# Patient Record
Sex: FEMALE | Race: WHITE | NOT HISPANIC OR LATINO | Employment: OTHER | URBAN - METROPOLITAN AREA
[De-identification: names, ages, dates, MRNs, and addresses within clinical notes are randomized per-mention and may not be internally consistent; named-entity substitution may affect disease eponyms.]

---

## 2017-01-04 ENCOUNTER — ALLSCRIPTS OFFICE VISIT (OUTPATIENT)
Dept: OTHER | Facility: OTHER | Age: 67
End: 2017-01-04

## 2017-01-04 DIAGNOSIS — E78.5 HYPERLIPIDEMIA: ICD-10-CM

## 2017-01-04 DIAGNOSIS — M85.88 OTHER SPECIFIED DISORDERS OF BONE DENSITY AND STRUCTURE, OTHER SITE: ICD-10-CM

## 2017-01-12 ENCOUNTER — ALLSCRIPTS OFFICE VISIT (OUTPATIENT)
Dept: OTHER | Facility: OTHER | Age: 67
End: 2017-01-12

## 2017-05-08 ENCOUNTER — ALLSCRIPTS OFFICE VISIT (OUTPATIENT)
Dept: OTHER | Facility: OTHER | Age: 67
End: 2017-05-08

## 2017-05-08 ENCOUNTER — HOSPITAL ENCOUNTER (OUTPATIENT)
Dept: RADIOLOGY | Facility: MEDICAL CENTER | Age: 67
Discharge: HOME | End: 2017-05-08
Payer: MEDICARE

## 2017-05-08 ENCOUNTER — GENERIC CONVERSION - ENCOUNTER (OUTPATIENT)
Dept: OTHER | Facility: OTHER | Age: 67
End: 2017-05-08

## 2017-05-08 DIAGNOSIS — M70.52 OTHER BURSITIS OF KNEE, LEFT KNEE: ICD-10-CM

## 2017-05-08 PROCEDURE — 73562 X-RAY EXAM OF KNEE 3: CPT

## 2017-05-25 ENCOUNTER — LAB CONVERSION - ENCOUNTER (OUTPATIENT)
Dept: OTHER | Facility: OTHER | Age: 67
End: 2017-05-25

## 2017-05-25 ENCOUNTER — GENERIC CONVERSION - ENCOUNTER (OUTPATIENT)
Dept: OTHER | Facility: OTHER | Age: 67
End: 2017-05-25

## 2017-05-25 LAB
A/G RATIO (HISTORICAL): 1.5 (CALC) (ref 1–2.5)
ALBUMIN SERPL BCP-MCNC: 3.9 G/DL (ref 3.6–5.1)
ALP SERPL-CCNC: 53 U/L (ref 33–130)
ALT SERPL W P-5'-P-CCNC: 19 U/L (ref 6–29)
AST SERPL W P-5'-P-CCNC: 16 U/L (ref 10–35)
BILIRUB SERPL-MCNC: 0.6 MG/DL (ref 0.2–1.2)
BUN SERPL-MCNC: 14 MG/DL (ref 7–25)
BUN/CREA RATIO (HISTORICAL): NORMAL (CALC) (ref 6–22)
CALCIUM SERPL-MCNC: 9.3 MG/DL (ref 8.6–10.4)
CHLORIDE SERPL-SCNC: 100 MMOL/L (ref 98–110)
CHOLEST SERPL-MCNC: 193 MG/DL (ref 125–200)
CHOLEST/HDLC SERPL: 5.4 (CALC)
CO2 SERPL-SCNC: 28 MMOL/L (ref 20–31)
CREAT SERPL-MCNC: 0.9 MG/DL (ref 0.5–0.99)
EGFR AFRICAN AMERICAN (HISTORICAL): 76 ML/MIN/1.73M2
EGFR-AMERICAN CALC (HISTORICAL): 66 ML/MIN/1.73M2
GAMMA GLOBULIN (HISTORICAL): 2.6 G/DL (CALC) (ref 1.9–3.7)
GLUCOSE (HISTORICAL): 93 MG/DL (ref 65–99)
HDLC SERPL-MCNC: 36 MG/DL
LDL CHOLESTEROL (HISTORICAL): 129 MG/DL (CALC)
NON-HDL-CHOL (CHOL-HDL) (HISTORICAL): 157 MG/DL (CALC)
POTASSIUM SERPL-SCNC: 4.4 MMOL/L (ref 3.5–5.3)
SODIUM SERPL-SCNC: 135 MMOL/L (ref 135–146)
TOTAL PROTEIN (HISTORICAL): 6.5 G/DL (ref 6.1–8.1)
TRIGL SERPL-MCNC: 141 MG/DL
TSH SERPL DL<=0.05 MIU/L-ACNC: 1.21 MIU/L (ref 0.4–4.5)

## 2017-06-29 ENCOUNTER — GENERIC CONVERSION - ENCOUNTER (OUTPATIENT)
Dept: OTHER | Facility: OTHER | Age: 67
End: 2017-06-29

## 2017-07-11 ENCOUNTER — GENERIC CONVERSION - ENCOUNTER (OUTPATIENT)
Dept: OTHER | Facility: OTHER | Age: 67
End: 2017-07-11

## 2017-07-13 ENCOUNTER — ALLSCRIPTS OFFICE VISIT (OUTPATIENT)
Dept: OTHER | Facility: OTHER | Age: 67
End: 2017-07-13

## 2017-09-13 DIAGNOSIS — Z12.31 ENCOUNTER FOR SCREENING MAMMOGRAM FOR MALIGNANT NEOPLASM OF BREAST: ICD-10-CM

## 2017-09-18 ENCOUNTER — GENERIC CONVERSION - ENCOUNTER (OUTPATIENT)
Dept: OTHER | Facility: OTHER | Age: 67
End: 2017-09-18

## 2017-09-18 ENCOUNTER — HOSPITAL ENCOUNTER (OUTPATIENT)
Dept: RADIOLOGY | Age: 67
Discharge: HOME | End: 2017-09-18
Payer: MEDICARE

## 2017-09-18 DIAGNOSIS — Z12.31 ENCOUNTER FOR SCREENING MAMMOGRAM FOR MALIGNANT NEOPLASM OF BREAST: ICD-10-CM

## 2017-09-18 PROCEDURE — G0202 SCR MAMMO BI INCL CAD: HCPCS

## 2017-10-06 ENCOUNTER — ALLSCRIPTS OFFICE VISIT (OUTPATIENT)
Dept: OTHER | Facility: OTHER | Age: 67
End: 2017-10-06

## 2017-10-07 NOTE — PROGRESS NOTES
Assessment  1  Left serous otitis media (381 4) (H65 92)    Plan  Left serous otitis media    · Zithromax Z-Ricco 250 MG Oral Tablet (Azithromycin); TAKE 2 TABLETS ON DAY 1  THEN TAKE 1 TABLET A DAY FOR 4 DAYS   · Call [phone] if: Your ear pain is getting worse ; Status:Complete;   Done:  14UYG6705 11:38AM    Discussion/Summary    Continue with symptom treatment  Start fluticasone nasal spray  Chief Complaint  1  Ear Pain   2  Sore Throat    History of Present Illness  HPI: History of allergic rhinitis on Claritin 10 mg daily year round  Patient presents with increased nasal congestion with sore throat and postnasal drainage  intermittent left ear pain  No drainage  mild non productive cough  Review of Systems    Constitutional: no fever-and-no chills  ENT: as noted in HPI,-no nasal discharge-and-no hoarseness  Respiratory: as noted in HPI,-no shortness of breath-and-no wheezing  Gastrointestinal: no nausea,-no vomiting-and-no diarrhea  Musculoskeletal: no myalgias  Neurological: no headache-and-no dizziness  Active Problems  1  Airborne allergy, current reaction (477 9) (J30 9)   2  Asymptomatic postmenopausal status (age-related) (natural) (V49 81) (Z78 0)   3  Basal cell carcinoma of skin (173 91) (C44 91)   4  Benign essential hypertension (401 1) (I10)   5  Closed Fracture Of Head Of Left Radius (813 05)   6  Degenerative arthritis of left knee (715 96) (M17 9)   7  Displacement of lumbar intervertebral disc (722 10) (M51 26)   8  Dysfunction of left eustachian tube (381 81) (H69 82)   9  GERD without esophagitis (530 81) (K21 9)   10  Hyperlipidemia (272 4) (E78 5)   11  Hyperthyroidism (242 90) (E05 90)   12  Left serous otitis media (381 4) (H65 92)   13  Lumbar radiculopathy (724 4) (M54 16)   14  Osteopenia (733 90) (M85 80)   15  Osteopenia of other site (733 90) (M85 88)   16  Osteoporosis (733 00) (M81 0)    Past Medical History  1   History of Fibrocystic breast disease, unspecified laterality (610 1) (N60 19)   2  History of hypercholesterolemia (V12 29) (Z86 39)   3  History of sinusitis (V12 69) (Z87 09)    Family History  Mother    1  Family history of Hypertension (V17 49)   2  Family history of Thyroid Disorder (V18 19)  Brother    3  Family history of Diabetes Mellitus (V18 0)   4  Family history of Renal Failure    Social History   · Denied: History of Alcohol   · Marital History - Currently    · Never a smoker   · Never A Smoker    Current Meds   1  Advil CAPS; Therapy: (Recorded:36Krp1058) to Recorded   2  Aspirin 81 MG TABS; Therapy: (Recorded:94Fqq7327) to Recorded   3  Biotin 1 MG Oral Capsule Recorded   4  Calcium + D TABS Recorded   5  Claritin 10 MG Oral Tablet; take 1 tablet daily as needed Recorded   6  [facility] Fish Oil CAPS Recorded   7  Fluticasone Propionate 50 MCG/ACT Nasal Suspension; USE AS DIRECTED Recorded   8  Lisinopril-Hydrochlorothiazide 20-12 5 MG Oral Tablet; TAKE 1 TABLET DAILY; Therapy: 65OTZ6380 to ([phone])  Requested for: 04Qpr1954; Last   Rx:41Gfw5507 Ordered   9  Metoprolol Succinate ER 50 MG Oral Tablet Extended Release 24 Hour; TAKE 1 TABLET   DAILY; Therapy: 03ZJU3961 to (Evaluate:63Uaf4733); Last AR:76UOV9196 Ordered   10  Omeprazole 20 MG Oral Capsule Delayed Release; TAKE 1 CAP DAILY IN THE    MORNING; Therapy: 09ZZG2468 to (YTVHKOKE:58XDC4203)  Requested for: 96KBQ6620; Last    Rx:26Jan2017 Ordered   11  Pravastatin Sodium 20 MG Oral Tablet; 1 tab orally twice a week due to muscle pain with    atorvastatin; Therapy: 28NFC3245 to (Serenity Mera)  Requested for: 96VMZ6981; Last    Rx:04Jan2017 Ordered    Allergies  1   No Known Drug Allergies    Vitals   Recorded: 67JRL5314 11:20AM   Temperature 98 5 F   Heart Rate 70   Respiration 16   Systolic 175   Diastolic 64   Height 5 ft 2 in   Weight 148 lb 9 6 oz   BMI Calculated 27 18   BSA Calculated 1 68     Physical Exam    Constitutional   General appearance: No acute distress, well appearing and well nourished  Head and Face   Palpation of the face and sinuses: No sinus tenderness  Eyes   Conjunctiva and lids: No swelling, erythema or discharge  Ears, Nose, Mouth, and Throat   Otoscopic examination: Abnormal   The right tympanic membrane was normal  The left tympanic membrane was retracted, but-was not red  Exam of the left middle ear showed a middle ear effusion  Oropharynx: Abnormal  -Mild redness and cobblestoning posterior pharynx  Neck   Neck: Supple, symmetric, trachea midline, no masses  Thyroid: Normal, no thyromegaly  Pulmonary   Respiratory effort: No increased work of breathing or signs of respiratory distress  Auscultation of lungs: Clear to auscultation  Cardiovascular   Auscultation of heart: Normal rate and rhythm, normal S1 and S2, no murmurs  Lymphatic   Palpation of lymph nodes in neck: No lymphadenopathy  Skin   Skin and subcutaneous tissue: Normal without rashes or lesions         Signatures   Electronically signed by : PADMINI Argueta ; Oct  6 2017 11:39AM EST                       (Author)

## 2017-12-22 ENCOUNTER — ALLSCRIPTS OFFICE VISIT (OUTPATIENT)
Dept: OTHER | Facility: OTHER | Age: 67
End: 2017-12-22

## 2017-12-23 NOTE — PROGRESS NOTES
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)    Plan   Other herpes zoster eye disease    · PredniSONE 10 MG Oral Tablet; 1 1/2  PO BID FOR 3 DAYS THEN 1 PO BID FOR 3    DAYS  WITH FOOD;   · ValACYclovir HCl - 1 GM Oral Tablet; TAKE 1 TABLET 3 TIMES DAILY    Discussion/Summary      ADD VALTREX PRED UP WTIH OPHTHO- SHE WILL CALL HER EYE DR  Chief Complaint   1  Headache   2  Nasal Symptoms  patient HERE FOR HEADACHE AND NASAL CONGESTION;           History of Present Illness   Herpes Zoster (Brief): The patient is being seen for worsening symptoms of herpes zoster  Symptoms:  dermatomal pain,-- skin tingling,-- skin redness,-- rash-- and-- vesicular lesion(s), but-- no localized itching,-- no lesion drainage-- and-- no lesion crusting  The patient is currently experiencing symptoms  Associated symptoms:  no fever,-- no chills,-- no myalgias,-- no headache,-- no eye redness,-- no photophobia,-- no hearing loss-- and-- no tinnitus  The patient is not currently being treated for this problem  By report, there is good compliance with treatment  HPI: HEADACHES, PRESSURE ABOVE HER EYES AND UNDER HER EYES; SHE HAS OCC PAIN IN RIGHT TEMPLE- SHE HAS A LITTLE RASH THERE; SHE HAD SOME TEARING YESTERDAY OF RIGHT EYE;    Headache: Moris Mobley presents with complaints of headache  Associated symptoms include different headache features-- and-- scalp tenderness, but-- no fever,-- no chills,-- no vertigo,-- no ataxia,-- no dysarthria,-- no aphasia,-- no diplopia-- and-- no vision loss  Review of Systems        Constitutional: feeling tired, but-- as noted in HPI       ENT: RIGHT SIDED TEMPLE PAIN, but-- no ear ache, no loss of hearing, no nosebleeds or nasal discharge, no sore throat or hoarseness-- and-- as noted in HPI  Cardiovascular: NASAL CONGESITON, but-- no complaints of slow or fast heart rate, no chest pain, no palpitations, no leg claudication or lower extremity edema  Respiratory: no complaints of shortness of breath, no wheezing, no dyspnea on exertion, no orthopnea or PND-- and-- as noted in HPI  Breasts: no complaints of breast pain, breast lump or nipple discharge  Gastrointestinal: no complaints of abdominal pain, no constipation, no nausea or diarrhea, no vomiting, no bloody stools  Genitourinary: no complaints of dysuria, no incontinence, no pelvic pain, no dysmenorrhea, no vaginal discharge or abnormal vaginal bleeding  Musculoskeletal: no complaints of arthralgia, no myalgia, no joint swelling or stiffness, no limb pain or swelling  Integumentary: no complaints of skin rash or lesion, no itching or dry skin, no skin wounds  Neurological: headache-- and-- TENDERNESS RIGHT TEMPLE AND RASH, but-- no complaints of headache, no confusion, no numbness or tingling, no dizziness or fainting-- and-- as noted in HPI  ROS reviewed  Active Problems   1  Airborne allergy, current reaction (477 9) (J30 9)   2  Asymptomatic postmenopausal status (age-related) (natural) (V49 81) (Z78 0)   3  Basal cell carcinoma of skin (173 91) (C44 91)   4  Benign essential hypertension (401 1) (I10)   5  Closed Fracture Of Head Of Left Radius (813 05)   6  Degenerative arthritis of left knee (715 96) (M17 9)   7  Displacement of lumbar intervertebral disc (722 10) (M51 26)   8  Dysfunction of left eustachian tube (381 81) (H69 82)   9  GERD without esophagitis (530 81) (K21 9)   10  Hyperlipidemia (272 4) (E78 5)   11  Hyperthyroidism (242 90) (E05 90)   12  Left serous otitis media (381 4) (H65 92)   13  Lumbar radiculopathy (724 4) (M54 16)   14  Need for influenza vaccination (V04 81) (Z23)   15  Osteopenia (733 90) (M85 80)   16  Osteopenia of other site (733 90) (M85 88)   17  Osteoporosis (733 00) (M81 0)    Past Medical History   Active Problems And Past Medical History Reviewed:     The active problems and past medical history were reviewed and updated today       Surgical History   Surgical History Reviewed: The surgical history was reviewed and updated today  Social History    · Denied: History of Alcohol   · Marital History - Currently    · Never a smoker   · Never A Smoker  The social history was reviewed and updated today  The social history was reviewed and is unchanged  Family History   Family History Reviewed: The family history was reviewed and updated today  Current Meds    1  Advil CAPS; Therapy: (Recorded:60Npm8913) to Recorded   2  Aspirin 81 MG TABS; Therapy: (Recorded:22Jms4270) to Recorded   3  Biotin 1 MG Oral Capsule Recorded   4  Calcium + D TABS Recorded   5  Claritin 10 MG Oral Tablet; take 1 tablet daily as needed Recorded   6  [employer] Fish Oil CAPS Recorded   7  Fluticasone Propionate 50 MCG/ACT Nasal Suspension; USE AS DIRECTED Recorded   8  Lisinopril-Hydrochlorothiazide 20-12 5 MG Oral Tablet; TAKE 1 TABLET DAILY; Therapy: 49GBN9597 to ([phone])  Requested for: 74Uri3486; Last     Rx:47Kok3965 Ordered   9  Metoprolol Succinate ER 50 MG Oral Tablet Extended Release 24 Hour; TAKE 1 TABLET     DAILY; Therapy: 55BUQ9615 to (Evaluate:52Tta3147); Last SA:10KNQ8501 Ordered   10  Omeprazole 20 MG Oral Capsule Delayed Release; TAKE 1 CAP DAILY IN THE      MORNING; Therapy: 30VBU7430 to ([facility]58[facility]45)  Requested for: 04CSY3926; Last      Rx:33Nra2096 Ordered   11  Pravastatin Sodium 20 MG Oral Tablet; 1 tab orally twice a week due to muscle pain with      atorvastatin; Therapy: 19HXV1316 to (Mony Raviing)  Requested for: 74Cfh0146; Last      Rx:01Qxt2647 Ordered     The medication list was reviewed and updated today  Allergies   1   No Known Drug Allergies    Vitals    Recorded: 22Dec2017 11:32AM   Temperature 98 5 F, Tympanic   Heart Rate 60   Respiration 16   Systolic 101, LUE, Sitting   Diastolic 78, LUE, Sitting   Height 5 ft 2 in   Weight 150 lb 8 oz   BMI Calculated 27 53   BSA Calculated 1 69     Physical Exam        Constitutional      General appearance: No acute distress, well appearing and well nourished  -- WDWN FEMALE  Eyes      Conjunctiva and lids: No swelling, erythema or discharge  -- EOMI PERRLA; SCLERA ANICTERIC; SOME TEARING RIGHT EYE;  Pupils and irises: Equal, round and reactive to light  Ears, Nose, Mouth, and Throat      External inspection of ears and nose: Normal        Otoscopic examination: Tympanic membranes translucent with normal light reflex  Canals patent without erythema  Oropharynx: Normal with no erythema, edema, exudate or lesions  Pulmonary      Respiratory effort: No increased work of breathing or signs of respiratory distress  Auscultation of lungs: Clear to auscultation  Cardiovascular      Palpation of heart: Normal PMI, no thrills  Auscultation of heart: Normal rate and rhythm, normal S1 and S2, without murmurs  Lymphatic      Palpation of lymph nodes in neck: No lymphadenopathy  Skin      Skin and subcutaneous tissue: Abnormal  -- RED RAISED RASH RIGHT TEMPLE; SOME HYEPRESTHESIA  Neurologic      Cranial nerves: Cranial nerves 2-12 intact  Reflexes: 2+ and symmetric  Signatures    Electronically signed by :  Alondra [address] [place] [address]; Dec 22 2017 12:02PM EST                       (Author)

## 2018-01-10 NOTE — RESULT NOTES
Verified Results  (1) CBC/PLT/DIFF 24VUC0938 09:40AM Francisco Part     Test Name Result Flag Reference   WHITE BLOOD CELL COUNT 4 4 Thousand/uL  3 8-10 8   RED BLOOD CELL COUNT 4 14 Million/uL  3 80-5 10   HEMOGLOBIN 12 9 g/dL  11 7-15 5   HEMATOCRIT 39 0 %  35 0-45 0   MCV 94 2 fL  80 0-100 0   MCH 31 1 pg  27 0-33 0   MCHC 33 0 g/dL  32 0-36 0   RDW 14 4 %  11 0-15 0   PLATELET COUNT 207 Thousand/uL  140-400   MPV 7 4 fL L 7 5-11 5   ABSOLUTE NEUTROPHILS 2561 cells/uL  5143-1934   ABSOLUTE LYMPHOCYTES 1285 cells/uL  850-3900   ABSOLUTE MONOCYTES 308 cells/uL  200-950   ABSOLUTE EOSINOPHILS 216 cells/uL     ABSOLUTE BASOPHILS 31 cells/uL  0-200   NEUTROPHILS 58 2 %     LYMPHOCYTES 29 2 %     MONOCYTES 7 0 %     EOSINOPHILS 4 9 %     BASOPHILS 0 7 %       (1) COMPREHENSIVE METABOLIC PANEL 60ESB1994 80:61CE Francisco Part     Test Name Result Flag Reference   GLUCOSE 99 mg/dL  65-99   Fasting reference interval   UREA NITROGEN (BUN) 14 mg/dL  7-25   CREATININE 0 85 mg/dL  0 50-0 99   For patients >52years of age, the reference limit  for Creatinine is approximately 13% higher for people  identified as -American  eGFR NON-AFR   AMERICAN 71 mL/min/1 73m2  > OR = 60   eGFR AFRICAN AMERICAN 82 mL/min/1 73m2  > OR = 60   BUN/CREATININE RATIO   6-74   NOT APPLICABLE (calc)   SODIUM 137 mmol/L  135-146   POTASSIUM 4 6 mmol/L  3 5-5 3   CHLORIDE 102 mmol/L     CARBON DIOXIDE 27 mmol/L  19-30   CALCIUM 9 5 mg/dL  8 6-10 4   PROTEIN, TOTAL 6 7 g/dL  6 1-8 1   ALBUMIN 4 0 g/dL  3 6-5 1   GLOBULIN 2 7 g/dL (calc)  1 9-3 7   ALBUMIN/GLOBULIN RATIO 1 5 (calc)  1 0-2 5   BILIRUBIN, TOTAL 0 7 mg/dL  0 2-1 2   ALKALINE PHOSPHATASE 56 U/L     AST 20 U/L  10-35   ALT 22 U/L  6-29     (1) LIPID PANEL, FASTING 63Rey5193 09:40AM Francisco Part     Test Name Result Flag Reference   CHOLESTEROL, TOTAL 218 mg/dL H 125-200   HDL CHOLESTEROL 30 mg/dL L > OR = 46   TRIGLICERIDES 515 mg/dL H <150   LDL-CHOLESTEROL 148 mg/dL (calc) H <130   Desirable range <100 mg/dL for patients with CHD or  diabetes and <70 mg/dL for diabetic patients with  known heart disease  CHOL/HDLC RATIO 7 3 (calc) H < OR = 5 0   NON HDL CHOLESTEROL 188 mg/dL (calc) H    Target for non-HDL cholesterol is 30 mg/dL higher than   LDL cholesterol target       (Q) TSH, 3RD GENERATION 44Adx5585 09:40AM Kolby Breaker   REPORT COMMENT:  FASTING:YES     Test Name Result Flag Reference   TSH 1 44 mIU/L  0 40-4 50

## 2018-01-10 NOTE — RESULT NOTES
Verified Results  * XR KNEE 3 VW LEFT NON INJURY 18TFH9556 02:19PM Kristi Alfaro Order Number: [id number]     Test Name Result Flag Reference   XR KNEE 3 VW LEFT (Report)     LEFT KNEE     INDICATION: Left knee pain  Bursitis  COMPARISON: September 9, 2014     VIEWS: AP, lateral and sunrise      IMAGES: 3     FINDINGS:     There is no acute fracture or dislocation  There is no joint effusion  Mild narrowing of the medial femorotibial joint space  No lytic or blastic lesions are seen  Soft tissues are unremarkable  IMPRESSION:     Mild degenerative changes, medial compartment  Workstation performed: [id number]     Signed by:   Noel Garrison MD   5/8/17

## 2018-01-12 VITALS
RESPIRATION RATE: 16 BRPM | HEART RATE: 70 BPM | BODY MASS INDEX: 27.34 KG/M2 | SYSTOLIC BLOOD PRESSURE: 116 MMHG | WEIGHT: 148.6 LBS | TEMPERATURE: 98.5 F | DIASTOLIC BLOOD PRESSURE: 64 MMHG | HEIGHT: 62 IN

## 2018-01-12 NOTE — RESULT NOTES
Discussion/Summary   Labs are all very good! Verified Results  (1) COMPREHENSIVE METABOLIC PANEL 64SKB3287 69:90FT Calvin Part     Test Name Result Flag Reference   GLUCOSE 93 mg/dL  65-99   Fasting reference interval   UREA NITROGEN (BUN) 14 mg/dL  7-25   CREATININE 0 90 mg/dL  0 50-0 99   For patients >52years of age, the reference limit  for Creatinine is approximately 13% higher for people  identified as -American  eGFR NON-AFR  AMERICAN 66 mL/min/1 73m2  > OR = 60   eGFR AFRICAN AMERICAN 76 mL/min/1 73m2  > OR = 60   BUN/CREATININE RATIO   6-92   NOT APPLICABLE (calc)   SODIUM 135 mmol/L  135-146   POTASSIUM 4 4 mmol/L  3 5-5 3   CHLORIDE 100 mmol/L     CARBON DIOXIDE 28 mmol/L  20-31   CALCIUM 9 3 mg/dL  8 6-10 4   PROTEIN, TOTAL 6 5 g/dL  6 1-8 1   ALBUMIN 3 9 g/dL  3 6-5 1   GLOBULIN 2 6 g/dL (calc)  1 9-3 7   ALBUMIN/GLOBULIN RATIO 1 5 (calc)  1 0-2 5   BILIRUBIN, TOTAL 0 6 mg/dL  0 2-1 2   ALKALINE PHOSPHATASE 53 U/L     AST 16 U/L  10-35   ALT 19 U/L  6-29     (1) LIPID PANEL, FASTING 48AWE4250 10:04AM Calvin Part     Test Name Result Flag Reference   CHOLESTEROL, TOTAL 193 mg/dL  125-200   HDL CHOLESTEROL 36 mg/dL L > OR = 46   TRIGLICERIDES 216 mg/dL  <150   LDL-CHOLESTEROL 129 mg/dL (calc)  <130   Desirable range <100 mg/dL for patients with CHD or  diabetes and <70 mg/dL for diabetic patients with  known heart disease  CHOL/HDLC RATIO 5 4 (calc) H < OR = 5 0   NON HDL CHOLESTEROL 157 mg/dL (calc)     Target for non-HDL cholesterol is 30 mg/dL higher than   LDL cholesterol target       (Q) TSH, 3RD GENERATION 80GXA8605 10:04AM Francisco Part   REPORT COMMENT:  FASTING:YES     Test Name Result Flag Reference   TSH 1 21 mIU/L  0 40-4 50

## 2018-01-13 VITALS
HEIGHT: 62 IN | SYSTOLIC BLOOD PRESSURE: 110 MMHG | WEIGHT: 153 LBS | RESPIRATION RATE: 16 BRPM | HEART RATE: 72 BPM | BODY MASS INDEX: 28.16 KG/M2 | DIASTOLIC BLOOD PRESSURE: 70 MMHG | TEMPERATURE: 98.3 F

## 2018-01-13 VITALS
WEIGHT: 148 LBS | HEART RATE: 72 BPM | RESPIRATION RATE: 16 BRPM | DIASTOLIC BLOOD PRESSURE: 78 MMHG | BODY MASS INDEX: 27.23 KG/M2 | SYSTOLIC BLOOD PRESSURE: 138 MMHG | HEIGHT: 62 IN | TEMPERATURE: 97.1 F

## 2018-01-13 VITALS
RESPIRATION RATE: 16 BRPM | HEIGHT: 62 IN | HEART RATE: 74 BPM | BODY MASS INDEX: 27.75 KG/M2 | SYSTOLIC BLOOD PRESSURE: 110 MMHG | TEMPERATURE: 98.2 F | WEIGHT: 150.8 LBS | DIASTOLIC BLOOD PRESSURE: 78 MMHG

## 2018-01-15 NOTE — RESULT NOTES
Verified Results  * MAMMO SCREENING BILATERAL W CAD 13Yss6647 12:01PM Jada Devlin Order Number: [id number]    Order Number: [id number]     Test Name Result Flag Reference   MAMMO SCREENING BILATERAL W CAD (Report)     Patient History:   Patient is postmenopausal and has history of squamous cell skin    cancer  No known family history of cancer  Excisional biopsy of the right breast    Patient has never smoked  Patient's BMI is 26 6  Reason for exam: screening (asymptomatic)  Mammo Screening Bilateral W CAD: September 12, 2016 - Check In #:   [de-identified]   Bilateral MLO, CC, and XCCL view(s) were taken  Technologist: RT Amie(R)(M)   Prior study comparison: July 23, 2015, bilateral [facility] digtl scrn   mammo w/CAD, performed at [address] [facility],[address]  July 10, 2014, bilateral WB digitl bilat adria, performed at [address] [place] [address]  July 5, 2013, right breast    unilateral diagnostic mammogram, performed at [address] [place] [address]  June 26, 2013, digital bilateral screening    mammogram performed at [address] [place] [address]  June 20, 2012, digital bilateral screening mammogram performed at [address] [place] [address]  Beth 15, 2011, digital bilateral    screening mammogram performed at [address] [place] [address]  There are scattered fibroglandular densities  No dominant soft tissue mass, architectural distortion or    suspicious calcifications are noted in either breast  The skin    and nipple contours are within normal limits  Probable cyst    noted at the retroareolar right breast, posteriorly  Targeted    ultrasound recommended        ASSESSMENT: BiRad:0 - Incomplete: needs additional imaging    evaluation     A breast health care nurse from our facility will be contacting    the patient regarding the need for additional imaging       Recommendation:   Ultrasound of the right breast    Analyzed by CAD     8-10% of cancers will be missed on mammography  Management of a    palpable abnormality must be based on clinical grounds  Patients   will be notified of their results via letter from our facility  Accredited by [employer] of [employer]iology and FDA  Transcription Location: ANDRES Escobedo 98: [id number]     Risk Value(s):   Tyrer-Cuzick 10 Year: 2 772%, Tyrer-Cuzick Lifetime: 5 304%,    Myriad Table: 1 5%, ELIZABETH 5 Year: 1 9%, NCI Lifetime: 6 6%   Signed by:   Abhinav Jimenez MD   9/12/16       Plan  Follow-up examination of abnormal mammogram    · US BREAST RIGHT COMPLETE (ABUS);  Status:Hold For - Scheduling; Requested  for:52Pcn0790;

## 2018-01-15 NOTE — RESULT NOTES
Discussion/Summary   Stable mammogram= repea tin one year     Verified Results  * MAMMO SCREENING BILATERAL W CAD 46Mdo5452 10:55AM Carlos David Order Number: [id number]    - Patient Instructions: To schedule this appointment, please contact Central Scheduling at [phone]  Do not wear any perfume, powder, lotion or deodorant on breast or underarm area  Please bring your doctors order, referral (if needed) and insurance information with you on the day of the test  Failure to bring this information may result in this test being rescheduled  Arrive 15 minutes prior to your appointment time to register  On the day of your test, please bring any prior mammogram or breast studies with you that were not performed at a [facility]  Failure to bring prior exams may result in your test needing to be rescheduled  Test Name Result Flag Reference   MAMMO SCREENING BILATERAL W CAD (Report)     Patient History:   Patient is postmenopausal and has history of other cancer  No known family history of cancer  Excisional biopsy of the right breast    Patient has never smoked  Patient's BMI is 26 6  Reason for exam: screening, asymptomatic  Mammo Screening Bilateral W CAD: September 18, 2017 - Check In #:   [de-identified]   Bilateral MLO, CC, and XCCL view(s) were taken  Technologist: ANDRES Rockwell (ANDRES)(M)   Prior study comparison: September 15, 2016, US breast right    limited, performed at [address] [place] [address]  September 12, 2016, mammo screening bilateral W CAD performed at    [address] [place] [address]  July 23, 2015, bilateral [facility]    digtl scrn mammo w/CAD, performed at [address] [facility]  July 10, 2014, bilateral WB digitl bilat adria, performed    at [address] [place] [address]  July 5, 2013, right breast   unilateral diagnostic mammogram, performed at [address] [place] [address]   June 26, 2013, digital bilateral screening mammogram performed at [facility]  June 20, 2012, digital bilateral screening mammogram performed at   [facility]  There are scattered fibroglandular densities  No new dominant    soft tissue mass, architectural distortion or suspicious    calcifications are noted  The skin and nipple structures are    within normal limits  There is a circumscribed nodule in the    subareolar posterior breast, gradually increasing in size but    previously shown to represent a benign-appearing simple cyst in    2016  No further workup necessitated at this time  In addition,   there is a persistent essentially stable density in the middle    3rd of the LEFT breast, also located along the nipple line  There is also chronically stable adjacent architectural    distortion just lateral to this on the CC projection is stable as   far back as 2012  No mammographic evidence of malignancy  No    significant changes when compared with prior studies  ACR BI-RADSï¾® Assessments: BiRad:2 - Benign     Recommendation:   Routine screening mammogram of both breasts in 1 year  Analyzed by CAD     The patient is scheduled in a reminder system for screening    mammography  8-10% of cancers will be missed on mammography  Management of a    palpable abnormality must be based on clinical grounds  Patients   will be notified of their results via letter from our facility  Accredited by [employer] of Radiology and FDA       [facility] [id number]     Risk Value(s):   Tyrer-Cuzick 10 Year: 2 800%, Tyrer-Cuzick Lifetime: 5 000%,    Myriad Table: 1 5%, ELIZABETH 5 Year: 1 9%, NCI Lifetime: 6 3%   Signed by:   Melo Calhoun MD   9/18/17

## 2018-01-15 NOTE — RESULT NOTES
Verified Results  *US BREAST RIGHT LIMITED (DIAGNOSTIC) [id number] 12:24PM Alli Bai     Test Name Result Flag Reference   US BREAST RIGHT LIMITED (Report)     Patient History:   Patient is postmenopausal and has history of other cancer  No known family history of cancer  Excisional biopsy of the right breast    Patient has never smoked  Patient's BMI is 26 6  Reason for exam: additional evaluation requested from abnormal    screening  49-year-old woman recalled from screening mammogram 9/12/2016 for   a right breast nodule  US Breast Right Limited: September 15, 2016 - Check In #: [de-identified]   Technologist: Camilo Arguello RT (R), CESAR   Prior study comparison: September 12, 2016, mammo screening    bilateral W CAD, performed at [address] [place] [address]  Heterogeneity can be either focal or diffuse  The breast    echotexture is characterized by multiple small areas of increased   and decreased echogenicity  Shadowing may occur at the    interfaces of the fat lobules and parenchyma  This pattern occurs   in younger breasts and those with heterogeneously dense    parenchyma depicted mammographically  Limited diagnostic    sonography of the right breast was performed  In the right    breast 6:00 retroareolar region, an oval circumscribed anechoic    mass measuring 0 6 x 0 4 x 0 8 cm is compatible with a benign    cyst and corresponds to the mammographic abnormality  No suspicious hypoechoic mass or architectural    distortion to suggest malignancy  The questioned right breast    nodule is a benign cyst      ASSESSMENT: BiRad:2 - Benign     Recommendation:   Routine screening mammogram of both breasts in 1 year       Transcription Location: [facility] 98: [id number]     Risk Value(s):   Tyrer-Cuzick 10 Year: 2 772%, Tyrer-Cuzick Lifetime: 5 304%,    Myriad Table: 1 5%, ELIZABETH 5 Year: 1 9%, NCI Lifetime: 6 6%   Signed by:   Francis Kirby MD   9/15/16

## 2018-01-16 NOTE — PROGRESS NOTES
Assessment    1  Medicare annual wellness visit, subsequent (V70 0) (Z00 00)   2  Benign essential hypertension (401 1) (I10)   3  Hyperlipidemia (272 4) (E78 5)    Plan  Advanced directives, counseling/discussion    · We recommend that you create an advance directive ; Status:Complete;   Done:  39FZV2404  Benign essential hypertension    · Metoprolol Succinate ER 50 MG Oral Tablet Extended Release 24 Hour; TAKE 1  TABLET DAILY  Encounter for screening mammogram for breast cancer    · * MAMMO SCREENING BILATERAL W CAD; Status:Active; Requested for:96Kjw3526; Health Maintenance    · Propranolol HCl - 80 MG Oral Tablet  Hyperlipidemia    · Pravastatin Sodium 20 MG Oral Tablet; 1 tab orally twice a week due to muscle  pain with atorvastatin   · (1) COMPREHENSIVE METABOLIC PANEL; Status:Active; Requested [id number];    · (1) LIPID PANEL, FASTING; Status:Active; Requested IWI:[id number];    · (1) TSH; Status:Active; Requested WTP:[id number];   Osteopenia of other site    · * DXA BONE DENSITY SPINE HIP AND PELVIS; Status:Active; Requested  [id number];     Discussion/Summary    AMW- IMM UP TO DATE  OBTAIN LABS- PT ON PRAVASTATIN 2X A WEEK (20 MG) DUE TO MYALGIAS  FOLLOW UP;  REPEAT BLOOD PRESSURE  CHANGE PROPRANOLOL TO METOPROLOL XL 50 DAILY- CALL IF IT IS ALSO EXPNSIVE;  Impression: Welcome to Medicare Visit, with preventive exam as well as age and risk appropriate counseling completed  Cardiovascular screening and counseling: the risks and benefits of screening were discussed, screening is current, counseling was given on maintaining a healthy diet, counseling was given on maintaining a healthy weight and Dx - V81 2 Screen for CV Disorder  Diabetes screening and counseling: counseling was given on maintaining a healthy diet and Dx - V77 1 Screen for DM  Colorectal cancer screening and counseling: Dx - V76 51 Screen for CRC     Osteoporosis screening and counseling: the risks and benefits of screening were discussed  Abdominal aortic aneurysm screening and counseling: Dx - V81 2 Screen for CV Disorder  Glaucoma screening and counseling: Dx - V80 1 Screen for Glaucoma  Advance Directive Planning: complete and up to date, she was encouraged to follow-up with me to discuss her questions and/or decisions  Patient Discussion: plan discussed with the patient  Chief Complaint  PATIENT HERE FOR AMW; SHE FEELS WELL      History of Present Illness  HPI: PT FEELS WELL   Welcome to Medicare and Wellness Visits: The patient is being seen for the welcome to medicare visit  Medicare Screening and Risk Factors   Hospitalizations: she has been previously hospitalizied and 2011 - PALPITATIONS, HYPERTENSION  Once per lifetime medicare screening tests: ECG has not been done  Medicare Screening Tests Risk Questions   Abdominal aortic aneurysm risk assessment: none indicated  Osteoporosis risk assessment: , female gender and UP TO DATE  HIV risk assessment: none indicated  Drug and Alcohol Use: The patient has never smoked cigarettes  The patient reports never drinking alcohol  Diet and Physical Activity: Current diet includes well balanced meals  She exercises infrequently and exercises 2-3 TIMES A WEEK times per week  Exercise: walking  Mood Disorder and Cognitive Impairment Screening: She denies feeling down, depressed, or hopeless over the past two weeks  She denies feeling little interest or pleasure in doing things over the past two weeks  Cognitive impairment screening: denies difficulty learning/retaining new information, denies difficulty handling complex tasks, denies difficulty with reasoning, denies difficulty with spatial ability and orientation, denies difficulty with language and denies difficulty with behavior  Functional Ability/Level of Safety: Hearing is normal bilaterally   The patient is currently able to do activities of daily living without limitations, able to do instrumental activities of daily living without limitations, able to participate in social activities without limitations and able to drive without limitations  Activities of daily living details: does not need help using the phone, no transportation help needed, does not need help shopping, no meal preparation help needed, does not need help doing housework, does not need help doing laundry, does not need help managing medications and does not need help managing money  Fall risk factors:  no polypharmacy, no alcohol use, no mobility impairment, no deconditioning, no postural hypotension, no sedative use, no visual impairment, no urinary incontinence, no cognitive impairment, up and go test was normal and no previous fall  Home safety risk factors:  no unfamiliar surroundings, no uneven floors and handrails on the stairs  Advance Directives: Advance directives: no living will, no durable power of  for health care directives and no advance directives  end of life decisions were not reviewed with the patient and I disagree with the patient's decisions  Concerns with the patient's end of life decisions: PT TO OBTAIN ONE  Co-Managers and Medical Equipment/Suppliers: See Patient Care Team   Reviewed Updated [address] [place] [address] [address]:   Last Medicare Wellness Visit Information was reviewed, patient interviewed, no change since last AWV  Preventive Quality Program 65 and Older: Falls Risk: The patient fell 0 times in the past 12 months  The patient is currently asymptomatic Symptoms Include:  Associated symptoms:  No associated symptoms are reported  The patient currently has no urinary incontinence symptoms  Date of last glaucoma screen was 7/2015      Patient Care Team    Care Team Member Role Specialty Office Number   Beatriz RUIZ  Orthopedic Surgery [phone]   Herminia RUIZ    Orthopedic Surgery [phone] [phone]     Review of Systems    Constitutional: negative, no malaise and no fatigue  Head and Face: no facial pressure  Eyes: negative, no eye pain and no watery discharge from the eyes  ENT: negative, no earache, no nasal discharge, no sneezing, no sore throat, no scratchy throat and no hoarseness  Cardiovascular: negative, no chest pain, the heart is not racing and no lightheadedness  Respiratory: negative, no shortness of breath, no wheezing, not sleeping upright or with extra pillows, no cough, no dry cough and no productive cough  Gastrointestinal: negative, no abdominal pain, no abdominal bloating, no nausea and no constipation  Genitourinary: negative, as noted in HPI, no dysuria, no urinary frequency and no urinary urgency  Musculoskeletal: negative, as noted in HPI, no diffuse joint pain and no generalized muscle aches  Integumentary and Breasts: negative, no rashes and no skin lesions  Neurological: negative, no headache and no confusion  Psychiatric: negative, no insomnia and no irritability  Endocrine: negative, no hot flashes, no night sweats, no muscle weakness and no generalized weakness  Hematologic and Lymphatic: negative, no swollen glands and no swollen glands in the neck  Active Problems    1  Allergic rhinitis (477 9) (J30 9)   2  Asymptomatic postmenopausal status (age-related) (natural) (V49 81) (Z78 0)   3  Basal cell carcinoma of skin (173 91) (C44 91)   4  Benign essential hypertension (401 1) (I10)   5  Bursitis of left knee (726 60) (M70 52)   6  Closed Fracture Of Head Of Left Radius (813 05)   7  Current tear knee, medial meniscus (836 0) (S83 249A)   8  Degenerative arthritis of left knee (715 96) (M17 9)   9  Displacement of lumbar intervertebral disc (722 10) (M51 26)   10  Dysfunction of left eustachian tube (381 81) (H69 82)   11  Encounter for screening mammogram for breast cancer (V76 12) (Z12 31)   12  Follow-up examination of abnormal mammogram (793 80) (R92 8)   13   GERD without esophagitis (530 81) (K21 9)   14  Hyperlipidemia (272 4) (E78 5)   15  Hyperthyroidism (242 90) (E05 90)   16  Influenza vaccination administered at current visit (V04 81) (Z23)   17  Left maxillary sinusitis (473 0) (J32 0)   18  Lumbar radiculopathy (724 4) (M54 16)   19  Medicare annual wellness visit, subsequent (V70 0) (Z00 00)   20  Osteopenia (733 90) (M85 80)   21  Osteoporosis (733 00) (M81 0)   22  Patellar bursitis, left (726 60) (M70 52)    Past Medical History    · History of Fibrocystic breast disease, unspecified laterality (610 1) (N60 19)   · History of hypercholesterolemia (V12 29) (Z86 39)   · History of sinusitis (V12 69) (Z87 09)    The active problems and past medical history were reviewed and updated today  Surgical History    The surgical history was reviewed and updated today  Family History  Mother    · Family history of Hypertension (V17 49)   · Family history of Thyroid Disorder (V18 19)  Brother    · Family history of Diabetes Mellitus (V18 0)   · Family history of Renal Failure    The family history was reviewed and updated today  Social History    · Denied: History of Alcohol   · Marital History - Currently    · Never a smoker   · Never A Smoker  The social history was reviewed and updated today  The social history was reviewed and is unchanged  Current Meds   1  Advil CAPS; Therapy: (Recorded:36Sxj9227) to Recorded   2  Aspirin 81 MG TABS; Therapy: (Recorded:77Win5586) to Recorded   3  Biotin 1 MG Oral Capsule Recorded   4  Calcium + D TABS Recorded   5  Claritin 10 MG Oral Tablet; take 1 tablet daily as needed Recorded   6  [employer] Fish Oil CAPS Recorded   7  Fluticasone Propionate 50 MCG/ACT Nasal Suspension; USE AS DIRECTED Recorded   8  Lisinopril-Hydrochlorothiazide 20-12 5 MG Oral Tablet; TAKE 1 TABLET DAILY; Therapy: 96NAH8839 to (Evaluate:16Nov2017)  Requested for: 21Nov2016; Last   Rx:21Nov2016 Ordered   9   Omeprazole 20 MG Oral Capsule Delayed Release; TAKE 1 CAP DAILY IN THE   MORNING; Therapy: 62AHF1490 to (Evaluate:27Jan2017)  Requested for: 26UPK8023; Last   Rx:11Itf6061 Ordered   10  Pravastatin Sodium 20 MG Oral Tablet; 1 tab orally twice a week due to muscle pain with    atorvastatin; Therapy: 91JHC5384 to (000 63 652)  Requested for: 41Des8085; Last    Rx:39Gwc3064 Ordered   11  Propranolol HCl - 80 MG Oral Tablet; 1 Tab daily; Therapy: 14MEC2266 to (Last Rx:18Jan2016) Ordered    Allergies    1  No Known Drug Allergies    Immunizations   ** Printed in Appendix #1 below  Vitals  Signs    Systolic: 798  Diastolic: 84   Temperature: 97 9 F  Heart Rate: 64  Respiration: 16  Systolic: 159  Diastolic: 82  Height: 5 ft 2 in  Weight: 153 lb   BMI Calculated: 27 98  BSA Calculated: 1 71    Physical Exam    Constitutional   General appearance: No acute distress, well appearing and well nourished  Eyes   Conjunctiva and lids: No swelling, erythema or discharge  Pupils and irises: Equal, round, reactive to light  WEARS GLASSES  Ears, Nose, Mouth, and Throat   External inspection of ears and nose: Normal     Otoscopic examination: Tympanic membranes translucent with normal light reflex  Canals patent without erythema  Hearing: Normal     Nasal mucosa, septum, and turbinates: Normal without edema or erythema  Lips, teeth, and gums: Normal, good dentition  Oropharynx: Normal with no erythema, edema, exudate or lesions  Neck   Neck: Supple, symmetric, trachea midline, no masses  Thyroid: Normal, no thyromegaly  Pulmonary   Respiratory effort: No increased work of breathing or signs of respiratory distress  Percussion of chest: Normal     Palpation of chest: Normal     Auscultation of lungs: Clear to auscultation  Auscultation of the lungs revealed no expiratory wheezing, normal expiratory time and no inspiratory wheezing  no rales or crackles were heard bilaterally  no rhonchi  no friction rub  no wheezing   no diminished breath sounds  no bronchial breath sounds  Cardiovascular   Palpation of heart: Normal PMI, no thrills  Auscultation of heart: Normal rate and rhythm, normal S1 and S2, no murmurs  Carotid pulses: 2+ bilaterally  NO BRUITS NOTED  Abdominal aorta: Normal     Femoral pulses: 2+ bilaterally  Pedal pulses: 2+ bilaterally  Examination of extremities for edema and/or varicosities: Normal     Chest   Palpation of breasts and axillae: Normal, no masses palpated  Abdomen   Abdomen: Non-tender, no masses  Liver and spleen: No hepatomegaly or splenomegaly  Lymphatic   Palpation of lymph nodes in neck: No lymphadenopathy  Musculoskeletal   Gait and station: Normal     Digits and nails: Normal without clubbing or cyanosis  Joints, bones, and muscles: Normal     Range of motion: Normal     Stability: Normal     Muscle strength/tone: Normal     Skin   Skin and subcutaneous tissue: Normal without rashes or lesions  Palpation of skin and subcutaneous tissue: Normal turgor  Neurologic   Cranial nerves: Cranial nerves II-XII intact  Reflexes: 2+ and symmetric  Sensation: No sensory loss  Psychiatric   Judgment and insight: Normal     Orientation to person, place, and time: Normal     Recent and remote memory: Intact  Mood and affect: Normal        Signatures   Electronically signed by :  Alondra Noble DO; 2017  1:20PM EST                       (Author)    Appendix #1     Patient: Ronni Caraballo ; : 1950; MRN: [id number]      0 0 1 6 1    DTP/DTaP          Influenza  05-Sep-2012  (61y) 25-Sep-2013  (62y) 08-Oct-2014  (63y) 15-Oct-2015  (65y) 29-Sep-2016  (65y)    PCV  2015  (64y)        PPSV  2014  (63y)        Varicella  2009

## 2018-01-22 VITALS
DIASTOLIC BLOOD PRESSURE: 84 MMHG | HEIGHT: 62 IN | TEMPERATURE: 97.9 F | SYSTOLIC BLOOD PRESSURE: 128 MMHG | HEART RATE: 64 BPM | WEIGHT: 153 LBS | RESPIRATION RATE: 16 BRPM | BODY MASS INDEX: 28.16 KG/M2

## 2018-01-23 VITALS
TEMPERATURE: 98.5 F | RESPIRATION RATE: 16 BRPM | HEART RATE: 60 BPM | BODY MASS INDEX: 27.7 KG/M2 | DIASTOLIC BLOOD PRESSURE: 78 MMHG | HEIGHT: 62 IN | SYSTOLIC BLOOD PRESSURE: 150 MMHG | WEIGHT: 150.5 LBS

## 2018-02-19 ENCOUNTER — OFFICE VISIT (OUTPATIENT)
Dept: FAMILY MEDICINE CLINIC | Facility: CLINIC | Age: 68
End: 2018-02-19
Payer: MEDICARE

## 2018-02-19 VITALS
RESPIRATION RATE: 16 BRPM | HEART RATE: 78 BPM | WEIGHT: 144 LBS | BODY MASS INDEX: 25.52 KG/M2 | DIASTOLIC BLOOD PRESSURE: 72 MMHG | SYSTOLIC BLOOD PRESSURE: 124 MMHG | HEIGHT: 63 IN | TEMPERATURE: 99 F

## 2018-02-19 DIAGNOSIS — J04.0 ACUTE LARYNGITIS: Primary | ICD-10-CM

## 2018-02-19 PROCEDURE — 99213 OFFICE O/P EST LOW 20 MIN: CPT | Performed by: FAMILY MEDICINE

## 2018-02-19 RX ORDER — LANOLIN ALCOHOL/MO/W.PET/CERES
1 CREAM (GRAM) TOPICAL DAILY
COMMUNITY

## 2018-02-19 RX ORDER — LORATADINE 10 MG/1
1 TABLET ORAL DAILY PRN
COMMUNITY

## 2018-02-19 RX ORDER — PREDNISONE 10 MG/1
10 TABLET ORAL 2 TIMES DAILY WITH MEALS
Qty: 8 TABLET | Refills: 0 | Status: SHIPPED | OUTPATIENT
Start: 2018-02-19 | End: 2018-02-23

## 2018-02-19 RX ORDER — OMEPRAZOLE 20 MG/1
1 TABLET, DELAYED RELEASE ORAL DAILY
COMMUNITY
Start: 2011-02-05 | End: 2019-01-23 | Stop reason: SDUPTHER

## 2018-02-19 RX ORDER — OMEGA-3 FATTY ACIDS CAP DELAYED RELEASE 1000 MG 1000 MG
1 CAPSULE DELAYED RELEASE ORAL DAILY
COMMUNITY
End: 2020-02-14

## 2018-02-19 RX ORDER — FLUTICASONE PROPIONATE 50 MCG
1 SPRAY, SUSPENSION (ML) NASAL AS NEEDED
COMMUNITY

## 2018-02-19 RX ORDER — LISINOPRIL AND HYDROCHLOROTHIAZIDE 20; 12.5 MG/1; MG/1
1 TABLET ORAL DAILY
COMMUNITY
Start: 2012-07-05 | End: 2018-03-14 | Stop reason: SDUPTHER

## 2018-02-19 RX ORDER — METOPROLOL SUCCINATE 50 MG/1
1 TABLET, EXTENDED RELEASE ORAL DAILY
COMMUNITY
Start: 2017-01-04 | End: 2019-01-23 | Stop reason: SDUPTHER

## 2018-02-19 RX ORDER — PRAVASTATIN SODIUM 20 MG
1 TABLET ORAL DAILY
COMMUNITY
Start: 2016-07-30 | End: 2018-11-20 | Stop reason: SDUPTHER

## 2018-02-19 NOTE — PATIENT INSTRUCTIONS
-Start taking Mucinex DM 1200mg twice a day  -Drink at least 10 glasses of water per day  -Honey as been shown to help with coughs  -You can use Tylenol as needed for fevers  -Practice good hygiene and cover your mouth if you cough  -Call us back if you have new or worsening fevers and other symptoms  -Patient instructions handout provided      Pharyngitis   AMBULATORY CARE:   Pharyngitis , or sore throat, is inflammation of the tissues and structures in your pharynx (throat)  Pharyngitis is most often caused by bacteria  It may also be caused by a cold or flu virus  Other causes include smoking, allergies, or acid reflux  Signs and symptoms that may occur with pharyngitis:   · Sore throat or pain when you swallow    · Fever, chills, and body aches    · Hoarse or raspy voice    · Cough, runny or stuffy nose, itchy or watery eyes    · Headache    · Upset stomach and loss of appetite    · Mild neck stiffness    · Swollen glands that feel like hard lumps when you touch your neck    · White and yellow pus-filled blisters in the back of your throat  Call 911 for any of the following:   · You have trouble breathing or swallowing because your throat is swollen or sore  Seek care immediately if:   · You are drooling because it hurts too much to swallow  · Your fever is higher than 102? F (39?C) or lasts longer than 3 days  · You are confused  · You taste blood in your throat  Contact your healthcare provider if:   · Your throat pain gets worse  · You have a painful lump in your throat that does not go away after 5 days  · Your symptoms do not improve after 5 days  · You have questions or concerns about your condition or care  Treatment for pharyngitis:  Viral pharyngitis will go away on its own without treatment  Your sore throat should start to feel better in 3 to 5 days for both viral and bacterial infections   You may need any of the following:  · Antibiotics  treat a bacterial infection  · NSAIDs , such as ibuprofen, help decrease swelling, pain, and fever  NSAIDs can cause stomach bleeding or kidney problems in certain people  If you take blood thinner medicine, always ask your healthcare provider if NSAIDs are safe for you  Always read the medicine label and follow directions  · Acetaminophen  decreases pain and fever  It is available without a doctor's order  Ask how much to take and how often to take it  Follow directions  Acetaminophen can cause liver damage if not taken correctly  Manage your symptoms:   · Gargle salt water  Mix ¼ teaspoon salt in an 8 ounce glass of warm water and gargle  This may help decrease swelling in your throat  · Drink liquids as directed  You may need to drink more liquids than usual  Liquids may help soothe your throat and prevent dehydration  Ask how much liquid to drink each day and which liquids are best for you  · Use a cool-steam humidifier  to help moisten the air in your room and calm your cough  · Soothe your throat  with cough drops, ice, soft foods, or popsicles  Prevent the spread of pharyngitis:  Cover your mouth and nose when you cough or sneeze  Do not share food or drinks  Wash your hands often  Use soap and water  If soap and water are unavailable, use an alcohol based hand   Follow up with your healthcare provider as directed:  Write down your questions so you remember to ask them during your visits  © 2017 [address] [employer] Information is for End User's use only and may not be sold, redistributed or otherwise used for commercial purposes  All illustrations and images included in CareNotes® are the copyrighted property of [employer]  or [employer]  The above information is an  only  It is not intended as medical advice for individual conditions or treatments   Talk to your doctor, nurse or pharmacist before following any medical regimen to see if it is safe and effective for you

## 2018-02-19 NOTE — PROGRESS NOTES
Assessment/Plan: Rachel Lynn is a 69 y o  female with:   1  Acute laryngitis/URI- 4 days of URI symptoms without evidence of bacterial infection and/or lung involvement  Does have an impressively hoarse voice on exam, will add prednisone burst and Mucinex Dm  Pt will call in if she wants Tessalon PRN or if sx worsen or has fevers, then would add Augmentin for sinusitis  Subjective:   Rachel Lynn is a 69 y o  female who presents today with a chief complaint of Cough; Earache; and Nasal Congestion    4-5 days of coughing, congestion, swollen glands     is sick  Took some Mucinex last night and it helped loosen up the cough      Sore Throat    This is a new problem  The current episode started in the past 7 days  The problem has been gradually worsening  Neither side of throat is experiencing more pain than the other  There has been no fever  Associated symptoms include congestion, coughing, ear pain, headaches, a hoarse voice, a plugged ear sensation and trouble swallowing  Pertinent negatives include no abdominal pain, diarrhea, shortness of breath or vomiting  She has had no exposure to strep or mono  She has tried acetaminophen for the symptoms  The treatment provided mild relief  Review of Systems   Constitutional: Positive for fatigue  Negative for chills and fever  HENT: Positive for congestion, ear pain, hoarse voice, sore throat and trouble swallowing  Negative for postnasal drip, rhinorrhea, sinus pain and sinus pressure  Respiratory: Positive for cough  Negative for shortness of breath and wheezing  Cardiovascular: Negative for chest pain and palpitations  Gastrointestinal: Positive for nausea  Negative for abdominal pain, diarrhea and vomiting  Neurological: Positive for headaches       Objective:  /72 (BP Location: Left arm, Patient Position: Sitting, Cuff Size: Large)   Pulse 78   Temp 99 °F (37 2 °C)   Resp 16   Ht 5' 2 5" (1 588 m)   Wt 65 3 kg (144 lb) Breastfeeding? No   BMI 25 92 kg/m²   Physical Exam   Constitutional: She appears well-developed and well-nourished  No distress  HENT:   Head: Normocephalic and atraumatic  Right Ear: Tympanic membrane and external ear normal  Tympanic membrane is not erythematous  No middle ear effusion  Left Ear: External ear normal  Tympanic membrane is not erythematous  A middle ear effusion is present  Nose: Right sinus exhibits no maxillary sinus tenderness and no frontal sinus tenderness  Left sinus exhibits no maxillary sinus tenderness and no frontal sinus tenderness  Mouth/Throat: Posterior oropharyngeal erythema present  No oropharyngeal exudate  Eyes: Conjunctivae are normal  Pupils are equal, round, and reactive to light  Right eye exhibits no discharge  Left eye exhibits no discharge  Neck: Normal range of motion  Neck supple  Cardiovascular: Normal rate and regular rhythm  Pulmonary/Chest: Effort normal and breath sounds normal  No respiratory distress  She has no wheezes  Lymphadenopathy:     She has cervical adenopathy  Skin: She is not diaphoretic  Vitals reviewed        Histories Reviewed 2/19/2018:  Patient's Medications   New Prescriptions    PREDNISONE 10 MG TABLET    Take 1 tablet (10 mg total) by mouth 2 (two) times a day with meals for 4 days   Previous Medications    ASPIRIN 81 MG TABLET    Take 1 tablet by mouth daily    CALCIUM CITRATE-VITAMIN D (CITRACAL+D) 315-200 MG-UNIT PER TABLET    Take 1 tablet by mouth daily    FLUTICASONE (FLONASE) 50 MCG/ACT NASAL SPRAY    1 spray into each nostril Twice daily    LISINOPRIL-HYDROCHLOROTHIAZIDE (PRINZIDE,ZESTORETIC) 20-12 5 MG PER TABLET    Take 1 tablet by mouth daily    LORATADINE (CLARITIN) 10 MG TABLET    Take 1 tablet by mouth daily as needed    METOPROLOL SUCCINATE (TOPROL-XL) 50 MG 24 HR TABLET    Take 1 tablet by mouth Daily    OMEGA-3 FATTY ACIDS (FISH OIL) 1000 MG CPDR    Take 1 tablet by mouth daily    OMEPRAZOLE (PRILOSEC OTC) 20 MG TABLET    Take 1 capsule by mouth daily    PRAVASTATIN (PRAVACHOL) 20 MG TABLET    Take 1 tablet by mouth daily   Modified Medications    No medications on file   Discontinued Medications    No medications on file     No Known Allergies  History reviewed  No pertinent past medical history  Social History     Social History    Marital status: /Civil Union     Spouse name: N/A    Number of children: N/A    Years of education: N/A     Occupational History    Not on file  Social History Main Topics    Smoking status: Never Smoker    Smokeless tobacco: Never Used    Alcohol use No    Drug use: No    Sexual activity: Not on file     Other Topics Concern    Not on file     Social History Narrative    No narrative on file     Future Appointments  Date Time Provider Francisca Ryan   9/24/2018 11:00 AM [facility]     Patient Instructions   -Start taking Mucinex DM 1200mg twice a day  -Drink at least 10 glasses of water per day  -Honey as been shown to help with coughs  -You can use Tylenol as needed for fevers  -Practice good hygiene and cover your mouth if you cough  -Call us back if you have new or worsening fevers and other symptoms  -Patient instructions handout provided      Pharyngitis   AMBULATORY CARE:   Pharyngitis , or sore throat, is inflammation of the tissues and structures in your pharynx (throat)  Pharyngitis is most often caused by bacteria  It may also be caused by a cold or flu virus  Other causes include smoking, allergies, or acid reflux     Signs and symptoms that may occur with pharyngitis:   · Sore throat or pain when you swallow    · Fever, chills, and body aches    · Hoarse or raspy voice    · Cough, runny or stuffy nose, itchy or watery eyes    · Headache    · Upset stomach and loss of appetite    · Mild neck stiffness    · Swollen glands that feel like hard lumps when you touch your neck    · White and yellow pus-filled blisters in the back of your throat  Call 911 for any of the following:   · You have trouble breathing or swallowing because your throat is swollen or sore  Seek care immediately if:   · You are drooling because it hurts too much to swallow  · Your fever is higher than 102? F (39?C) or lasts longer than 3 days  · You are confused  · You taste blood in your throat  Contact your healthcare provider if:   · Your throat pain gets worse  · You have a painful lump in your throat that does not go away after 5 days  · Your symptoms do not improve after 5 days  · You have questions or concerns about your condition or care  Treatment for pharyngitis:  Viral pharyngitis will go away on its own without treatment  Your sore throat should start to feel better in 3 to 5 days for both viral and bacterial infections  You may need any of the following:  · Antibiotics  treat a bacterial infection  · NSAIDs , such as ibuprofen, help decrease swelling, pain, and fever  NSAIDs can cause stomach bleeding or kidney problems in certain people  If you take blood thinner medicine, always ask your healthcare provider if NSAIDs are safe for you  Always read the medicine label and follow directions  · Acetaminophen  decreases pain and fever  It is available without a doctor's order  Ask how much to take and how often to take it  Follow directions  Acetaminophen can cause liver damage if not taken correctly  Manage your symptoms:   · Gargle salt water  Mix ¼ teaspoon salt in an 8 ounce glass of warm water and gargle  This may help decrease swelling in your throat  · Drink liquids as directed  You may need to drink more liquids than usual  Liquids may help soothe your throat and prevent dehydration  Ask how much liquid to drink each day and which liquids are best for you  · Use a cool-steam humidifier  to help moisten the air in your room and calm your cough      · Soothe your throat  with cough drops, ice, soft foods, or popsicles  Prevent the spread of pharyngitis:  Cover your mouth and nose when you cough or sneeze  Do not share food or drinks  Wash your hands often  Use soap and water  If soap and water are unavailable, use an alcohol based hand   Follow up with your healthcare provider as directed:  Write down your questions so you remember to ask them during your visits  © 2017 [employer]  Information is for End User's use only and may not be sold, redistributed or otherwise used for commercial purposes  All illustrations and images included in CareNotes® are the copyrighted property of [employer]  or [employer]  The above information is an  only  It is not intended as medical advice for individual conditions or treatments  Talk to your doctor, nurse or pharmacist before following any medical regimen to see if it is safe and effective for you

## 2018-02-22 ENCOUNTER — TELEPHONE (OUTPATIENT)
Dept: FAMILY MEDICINE CLINIC | Facility: CLINIC | Age: 68
End: 2018-02-22

## 2018-02-22 DIAGNOSIS — J06.9 ACUTE URI: Primary | ICD-10-CM

## 2018-02-22 RX ORDER — AMOXICILLIN AND CLAVULANATE POTASSIUM 875; 125 MG/1; MG/1
1 TABLET, FILM COATED ORAL EVERY 12 HOURS SCHEDULED
Qty: 14 TABLET | Refills: 0 | Status: SHIPPED | OUTPATIENT
Start: 2018-02-22 | End: 2018-03-01

## 2018-02-22 NOTE — TELEPHONE ENCOUNTER
Pt called says she was just in with you on Monday  She says is getting a little better but she is having sinus congestion now and has a headache from it  She is asking if there is something she can take for the congestion or if you can prescribe her something for it

## 2018-03-14 DIAGNOSIS — I10 ESSENTIAL HYPERTENSION: Primary | ICD-10-CM

## 2018-03-14 RX ORDER — LISINOPRIL AND HYDROCHLOROTHIAZIDE 20; 12.5 MG/1; MG/1
1 TABLET ORAL DAILY
Qty: 90 TABLET | Refills: 0 | Status: SHIPPED | OUTPATIENT
Start: 2018-03-14 | End: 2018-06-01 | Stop reason: SDUPTHER

## 2018-06-01 DIAGNOSIS — I10 ESSENTIAL HYPERTENSION: ICD-10-CM

## 2018-06-01 RX ORDER — LISINOPRIL AND HYDROCHLOROTHIAZIDE 20; 12.5 MG/1; MG/1
TABLET ORAL
Qty: 90 TABLET | Refills: 0 | Status: SHIPPED | OUTPATIENT
Start: 2018-06-01 | End: 2018-08-19 | Stop reason: SDUPTHER

## 2018-08-19 DIAGNOSIS — I10 ESSENTIAL HYPERTENSION: ICD-10-CM

## 2018-08-20 RX ORDER — LISINOPRIL AND HYDROCHLOROTHIAZIDE 20; 12.5 MG/1; MG/1
TABLET ORAL
Qty: 90 TABLET | Refills: 0 | Status: SHIPPED | OUTPATIENT
Start: 2018-08-20 | End: 2018-11-07 | Stop reason: SDUPTHER

## 2018-09-04 ENCOUNTER — TELEPHONE (OUTPATIENT)
Dept: FAMILY MEDICINE CLINIC | Facility: CLINIC | Age: 68
End: 2018-09-04

## 2018-09-04 DIAGNOSIS — Z12.39 SCREENING FOR BREAST CANCER: Primary | ICD-10-CM

## 2018-09-04 NOTE — TELEPHONE ENCOUNTER
Patient  is requesting a script for a Mammo  Please advise  Patient would like a call when order is put through and she would like to pick it up   [phone]

## 2018-09-24 ENCOUNTER — HOSPITAL ENCOUNTER (OUTPATIENT)
Dept: RADIOLOGY | Age: 68
Discharge: HOME | End: 2018-09-24
Payer: MEDICARE

## 2018-09-24 DIAGNOSIS — Z12.39 SCREENING FOR BREAST CANCER: ICD-10-CM

## 2018-09-24 PROCEDURE — 77067 SCR MAMMO BI INCL CAD: CPT

## 2018-10-25 ENCOUNTER — IMMUNIZATION (OUTPATIENT)
Dept: FAMILY MEDICINE CLINIC | Facility: CLINIC | Age: 68
End: 2018-10-25
Payer: MEDICARE

## 2018-10-25 DIAGNOSIS — Z23 NEED FOR INFLUENZA VACCINATION: Primary | ICD-10-CM

## 2018-10-25 PROCEDURE — 90662 IIV NO PRSV INCREASED AG IM: CPT

## 2018-10-25 PROCEDURE — G0008 ADMIN INFLUENZA VIRUS VAC: HCPCS

## 2018-11-07 DIAGNOSIS — E78.2 MIXED HYPERLIPIDEMIA: Primary | ICD-10-CM

## 2018-11-07 DIAGNOSIS — I10 ESSENTIAL HYPERTENSION: ICD-10-CM

## 2018-11-07 RX ORDER — LISINOPRIL AND HYDROCHLOROTHIAZIDE 20; 12.5 MG/1; MG/1
TABLET ORAL
Qty: 90 TABLET | Refills: 0 | Status: SHIPPED | OUTPATIENT
Start: 2018-11-07 | End: 2019-08-14

## 2018-11-07 NOTE — TELEPHONE ENCOUNTER
Please have patient schedule a 3 month f/u with me or Dr Benjamin Jimenez, thanks!  She has not been seen for a regular visit with labs in >1 5 years

## 2018-11-07 NOTE — TELEPHONE ENCOUNTER
Called patient and scheduled appt 2/13/18  Do you want her to have labs drawn prior? If so, please order   She uses  lab

## 2018-11-20 DIAGNOSIS — E78.00 HYPERCHOLESTEREMIA: Primary | ICD-10-CM

## 2018-11-20 RX ORDER — PRAVASTATIN SODIUM 20 MG
TABLET ORAL
Qty: 24 TABLET | Refills: 3 | Status: SHIPPED | OUTPATIENT
Start: 2018-11-20 | End: 2019-02-13

## 2019-01-23 DIAGNOSIS — K21.9 GERD WITHOUT ESOPHAGITIS: Primary | ICD-10-CM

## 2019-01-23 DIAGNOSIS — I10 ESSENTIAL HYPERTENSION: ICD-10-CM

## 2019-01-23 RX ORDER — METOPROLOL SUCCINATE 50 MG/1
50 TABLET, EXTENDED RELEASE ORAL DAILY
Qty: 90 TABLET | Refills: 0 | Status: SHIPPED | OUTPATIENT
Start: 2019-01-23 | End: 2019-04-16 | Stop reason: SDUPTHER

## 2019-01-23 RX ORDER — OMEPRAZOLE 20 MG/1
20 CAPSULE, DELAYED RELEASE ORAL DAILY
Qty: 90 CAPSULE | Refills: 0 | Status: SHIPPED | OUTPATIENT
Start: 2019-01-23 | End: 2019-02-13

## 2019-01-23 RX ORDER — OMEPRAZOLE 20 MG/1
20 TABLET, DELAYED RELEASE ORAL DAILY
Qty: 90 TABLET | Refills: 0 | Status: SHIPPED | OUTPATIENT
Start: 2019-01-23 | End: 2019-02-13 | Stop reason: CLARIF

## 2019-02-05 LAB
BUN SERPL-MCNC: 13 MG/DL (ref 7–25)
BUN/CREAT SERPL: NORMAL (CALC) (ref 6–22)
CALCIUM SERPL-MCNC: 9.8 MG/DL (ref 8.6–10.4)
CHLORIDE SERPL-SCNC: 101 MMOL/L (ref 98–110)
CHOLEST SERPL-MCNC: 204 MG/DL
CHOLEST/HDLC SERPL: 5.4 (CALC)
CO2 SERPL-SCNC: 30 MMOL/L (ref 20–32)
CREAT SERPL-MCNC: 0.8 MG/DL (ref 0.6–0.93)
GLUCOSE SERPL-MCNC: 95 MG/DL (ref 65–99)
HDLC SERPL-MCNC: 38 MG/DL
LDLC SERPL CALC-MCNC: 136 MG/DL (CALC)
NONHDLC SERPL-MCNC: 166 MG/DL (CALC)
POTASSIUM SERPL-SCNC: 4.7 MMOL/L (ref 3.5–5.3)
SL AMB EGFR AFRICAN AMERICAN: 87 ML/MIN/1.73M2
SL AMB EGFR NON AFRICAN AMERICAN: 75 ML/MIN/1.73M2
SODIUM SERPL-SCNC: 135 MMOL/L (ref 135–146)
TRIGL SERPL-MCNC: 167 MG/DL

## 2019-02-12 RX ORDER — OMEPRAZOLE 20 MG/1
CAPSULE, DELAYED RELEASE ORAL
COMMUNITY
Start: 2018-11-05 | End: 2019-02-13 | Stop reason: CLARIF

## 2019-02-13 ENCOUNTER — OFFICE VISIT (OUTPATIENT)
Dept: FAMILY MEDICINE CLINIC | Facility: CLINIC | Age: 69
End: 2019-02-13
Payer: MEDICARE

## 2019-02-13 VITALS
TEMPERATURE: 98.6 F | WEIGHT: 147.6 LBS | SYSTOLIC BLOOD PRESSURE: 148 MMHG | RESPIRATION RATE: 16 BRPM | HEART RATE: 62 BPM | DIASTOLIC BLOOD PRESSURE: 82 MMHG | BODY MASS INDEX: 26.15 KG/M2 | HEIGHT: 63 IN

## 2019-02-13 DIAGNOSIS — E66.3 OVERWEIGHT (BMI 25.0-29.9): ICD-10-CM

## 2019-02-13 DIAGNOSIS — I10 BENIGN ESSENTIAL HYPERTENSION: ICD-10-CM

## 2019-02-13 DIAGNOSIS — K21.9 GERD WITHOUT ESOPHAGITIS: ICD-10-CM

## 2019-02-13 DIAGNOSIS — M85.89 OSTEOPENIA OF MULTIPLE SITES: ICD-10-CM

## 2019-02-13 DIAGNOSIS — E05.90 HYPERTHYROIDISM: ICD-10-CM

## 2019-02-13 DIAGNOSIS — Z11.59 ENCOUNTER FOR HEPATITIS C SCREENING TEST FOR LOW RISK PATIENT: ICD-10-CM

## 2019-02-13 DIAGNOSIS — E78.2 MIXED HYPERLIPIDEMIA: Primary | ICD-10-CM

## 2019-02-13 PROBLEM — M85.80 OSTEOPENIA: Status: ACTIVE | Noted: 2017-01-04

## 2019-02-13 PROCEDURE — 99214 OFFICE O/P EST MOD 30 MIN: CPT | Performed by: FAMILY MEDICINE

## 2019-02-13 RX ORDER — FAMOTIDINE 20 MG/1
20 TABLET, FILM COATED ORAL 2 TIMES DAILY PRN
Qty: 60 TABLET | Refills: 2 | Status: SHIPPED | OUTPATIENT
Start: 2019-02-13 | End: 2019-05-02

## 2019-02-13 RX ORDER — ROSUVASTATIN CALCIUM 20 MG/1
20 TABLET, COATED ORAL
Qty: 30 TABLET | Refills: 0 | Status: SHIPPED | OUTPATIENT
Start: 2019-02-13 | End: 2019-03-14 | Stop reason: SDUPTHER

## 2019-02-13 NOTE — PROGRESS NOTES
FAMILY MEDICINE PROGRESS NOTE  Albino Mcgarry 77 y o  female   DATE: February 13, 2019     ASSESSMENT and PLAN:  Albino Mcgarry is a 77 y o  female with:     Hyperthyroidism  Lab Results   Component Value Date    [id number] 1 21 05/24/2017     Inaccurate diagnosis, resolved    Osteopenia of multiple sites  2013 DEXA: T-scores of -2 0, -1 7 and -1 1 in the lumbar spine, total left hip and left femoral neck  Cont Calcium and Vit D  Recheck DEXA    Benign essential hypertension  BP Readings from Last 3 Encounters:   02/13/19 148/82   02/19/18 124/72   12/22/17 150/78     Results from last 6 Months   Lab Units 02/05/19  0939   POTASSIUM mmol/L 4 7   CHLORIDE mmol/L 101   CO2 mmol/L 30   BUN mg/dL 13   SL AMB CALCIUM mg/dL 9 8     Borderline controlled on current regimen:  -Lisinopril/HCTZ 20/12 5mg  -Metoprolol succinate 50mg daily    Mixed hyperlipidemia  Last Lipid Panel:  Lab Results   Component Value Date    CHOLESTEROL 204 (H) 02/05/2019    HDL 38 (L) 02/05/2019    LDLC 136 (H) 02/05/2019    TRIG 167 (H) 02/05/2019    [place] 157 05/24/2017     The 10-year ASCVD risk score (Elias Roa et al , 2013) is: 20%    Values used to calculate the score:      Age: 77 years      Sex: Female      Is Non- : No      Diabetic: No      Tobacco smoker: No      Systolic Blood Pressure: 131 mmHg      Is BP treated: Yes      HDL Cholesterol: 38 mg/dL      Total Cholesterol: 204 mg/dL    Reviewed healthy, low cholesterol diet, given handout  Currently uncontrolled on Pravastatin 20mg 2x/week  Had bad myalgias with Atorvastatin  Change to Rosuvastatin 20mg daily  Continue daily ASA 81mg    GERD without esophagitis  Has been on chronic PPI (Omeprazole 20mg) every day for years, never had an EGD, gets rebound symptoms if she miss a dose  Titration instructions provided to patient over the next 2 months    RTC 6 months for chronic f/u + AWV    SUBJECTIVE:  Albino Mcgarry is a 77 y o  female who presents today with a chief complaint of Hypertension (medication refills)  Ita Bernardo is here for a 6 month follow-up and to establish with me as a new PCP  The active chronic medical problems and medications are as below:   1  HLD- uncontrolled on statin 2 days/week  2  HTN- on 3 meds for >5 years, uncontrolled today  3  Osteopenia- last DEXA >5 years ago  4  GERD- on Omeprazole daily for years, gets rebound symptoms    Review of Systems   Eyes: Negative for visual disturbance  Respiratory: Negative for shortness of breath  Cardiovascular: Negative for chest pain and palpitations  Neurological: Negative for dizziness and light-headedness  I have reviewed the patient's PMH, Social History, Medication List and Allergies as appropriate  OBJECTIVE:  /82   Pulse 62   Temp 98 6 °F (37 °C)   Resp 16   Ht 5' 2 5" (1 588 m)   Wt 67 kg (147 lb 9 6 oz)   BMI 26 57 kg/m²    Physical Exam   Constitutional: She appears well-developed and well-nourished  No distress  Cardiovascular: Normal rate, regular rhythm and normal heart sounds  Pulmonary/Chest: Effort normal and breath sounds normal  No respiratory distress  She has no wheezes  She has no rales  Musculoskeletal: She exhibits edema (trace pitting edema of left ankle)  Skin: She is not diaphoretic  Vitals reviewed  Orders Only on 02/05/2019   Component Date Value Ref Range Status    Total Cholesterol 02/05/2019 204* <200 mg/dL Final    HDL 02/05/2019 38* >50 mg/dL Final    Triglycerides 02/05/2019 167* <150 mg/dL Final    LDL Direct 02/05/2019 136* mg/dL (calc) Final    Comment: Reference range: <100     Desirable range <100 mg/dL for primary prevention;    <70 mg/dL for patients with CHD or diabetic patients   with > or = 2 CHD risk factors       LDL-C is now calculated using the Brant-Flanagan   calculation, which is a validated novel method providing   better accuracy than the Friedewald equation in the   estimation of LDL-C  Vin Bradford  Laura Hernandez  2955;187(74): [id number]   ([email or web address]/faq/LCN854)      Chol HDLC Ratio 02/05/2019 5 4* <5 0 (calc) Final    Non-HDL Cholesterol 02/05/2019 166* <130 mg/dL (calc) Final    Comment: For patients with diabetes plus 1 major ASCVD risk   factor, treating to a non-HDL-C goal of <100 mg/dL   (LDL-C of <70 mg/dL) is considered a therapeutic   option   Glucose, Random 02/05/2019 95  65 - 99 mg/dL Final    Comment:               Fasting reference interval         BUN 02/05/2019 13  7 - 25 mg/dL Final    Creatinine 02/05/2019 0 80  0 60 - 0 93 mg/dL Final    Comment: For patients >52years of age, the reference limit  for Creatinine is approximately 13% higher for people  identified as -American   eGFR Non African American 02/05/2019 75  > OR = 60 mL/min/1 73m2 Final    eGFR  02/05/2019 87  > OR = 60 mL/min/1 73m2 Final    SL AMB BUN/CREATININE RATIO 26/51/0371 NOT APPLICABLE  6 - 22 (calc) Final    Sodium 02/05/2019 135  135 - 146 mmol/L Final    Potassium 02/05/2019 4 7  3 5 - 5 3 mmol/L Final    Chloride 02/05/2019 101  98 - 110 mmol/L Final    CO2 02/05/2019 30  20 - 32 mmol/L Final    SL AMB CALCIUM 02/05/2019 9 8  8 6 - 10 4 mg/dL Final       Videhi M Marrian Severe, MD    Note: Portions of the record may have been created with voice recognition software  Occasional wrong word or "sound a like" substitutions may have occurred due to the inherent limitations of voice recognition software  Read the chart carefully and recognize, using context, where substitutions have occurred  BMI Counseling: Body mass index is 26 57 kg/m²  Discussed the patient's BMI with her  The BMI is above average  BMI counseling and education was provided to the patient  Nutrition recommendations include reducing portion sizes, decreasing overall calorie intake, moderation in carbohydrate intake and reducing intake of cholesterol

## 2019-02-13 NOTE — PATIENT INSTRUCTIONS
Month 1: Take Omeprazole every other day, alternate with Pepcid 20mg as needed  Month 2: Take Omeprazole every 3 days, alternate with Pepcid 20mg as needed    I recommend that you take either Ergocalciferol (Vit D) 800-1000 units daily with Calcium carbonate 1200mg daily or Calcium citrate 1200mg daily    Obesity   AMBULATORY CARE:   Obesity  is when your body mass index (BMI) is greater than 30  Your healthcare provider will use your height and weight to measure your BMI  The risks of obesity include  many health problems, such as injuries or physical disability  You may need tests to check for the following:  · Diabetes     · High blood pressure or high cholesterol     · Heart disease     · Gallbladder or liver disease     · Cancer of the colon, breast, prostate, liver, or kidney     · Sleep apnea     · Arthritis or gout  Seek care immediately if:   · You have a severe headache, confusion, or difficulty speaking  · You have weakness on one side of your body  · You have chest pain, sweating, or shortness of breath  Contact your healthcare provider if:   · You have symptoms of gallbladder or liver disease, such as pain in your upper abdomen  · You have knee or hip pain and discomfort while walking  · You have symptoms of diabetes, such as intense hunger and thirst, and frequent urination  · You have symptoms of sleep apnea, such as snoring or daytime sleepiness  · You have questions or concerns about your condition or care  Treatment for obesity  focuses on helping you lose weight to improve your health  Even a small decrease in BMI can reduce the risk for many health problems  Your healthcare provider will help you set a weight-loss goal   · Lifestyle changes  are the first step in treating obesity  These include making healthy food choices and getting regular physical activity  Your healthcare provider may suggest a weight-loss program that involves coaching, education, and therapy  · Medicine  may help you lose weight when it is used with a healthy diet and physical activity  · Surgery  can help you lose weight if you are very obese and have other health problems  There are several types of weight-loss surgery  Ask your healthcare provider for more information  Be successful losing weight:   · Set small, realistic goals  An example of a small goal is to walk for 20 minutes 5 days a week  Anther goal is to lose 5% of your body weight  · Tell friends, family members, and coworkers about your goals  and ask for their support  Ask a friend to lose weight with you, or join a weight-loss support group  · Identify foods or triggers that may cause you to overeat , and find ways to avoid them  Remove tempting high-calorie foods from your home and workplace  Place a bowl of fresh fruit on your kitchen counter  If stress causes you to eat, then find other ways to cope with stress  · Keep a diary to track what you eat and drink  Also write down how many minutes of physical activity you do each day  Weigh yourself once a week and record it in your diary  Eating changes: You will need to eat 500 to 1,000 fewer calories each day than you currently eat to lose 1 to 2 pounds a week  The following changes will help you cut calories:  · Eat smaller portions  Use small plates, no larger than 9 inches in diameter  Fill your plate half full of fruits and vegetables  Measure your food using measuring cups until you know what a serving size looks like  · Eat 3 meals and 1 or 2 snacks each day  Plan your meals in advance  Alana Dry and eat at home most of the time  Eat slowly  · Eat fruits and vegetables at every meal   They are low in calories and high in fiber, which makes you feel full  Do not add butter, margarine, or cream sauce to vegetables  Use herbs to season steamed vegetables  · Eat less fat and fewer fried foods  Eat more baked or grilled chicken and fish   These protein sources are lower in calories and fat than red meat  Limit fast food  Dress your salads with olive oil and vinegar instead of bottled dressing  · Limit the amount of sugar you eat  Do not drink sugary beverages  Limit alcohol  Activity changes:  Physical activity is good for your body in many ways  It helps you burn calories and build strong muscles  It decreases stress and depression, and improves your mood  It can also help you sleep better  Talk to your healthcare provider before you begin an exercise program   · Exercise for at least 30 minutes 5 days a week  Start slowly  Set aside time each day for physical activity that you enjoy and that is convenient for you  It is best to do both weight training and an activity that increases your heart rate, such as walking, bicycling, or swimming  · Find ways to be more active  Do yard work and housecleaning  Walk up the stairs instead of using elevators  Spend your leisure time going to events that require walking, such as outdoor festivals or fairs  This extra physical activity can help you lose weight and keep it off  Follow up with your healthcare provider as directed: You may need to meet with a dietitian  Write down your questions so you remember to ask them during your visits  © 2017 [employer]  Information is for End User's use only and may not be sold, redistributed or otherwise used for commercial purposes  All illustrations and images included in CareNotes® are the copyrighted property of [employer]  or [employer]  The above information is an  only  It is not intended as medical advice for individual conditions or treatments  Talk to your doctor, nurse or pharmacist before following any medical regimen to see if it is safe and effective for you  Heart Healthy Diet   AMBULATORY CARE:   A heart healthy diet  is an eating plan low in total fat, unhealthy fats, and sodium (salt)   A heart healthy diet helps decrease your risk for heart disease and stroke  Limit the amount of fat you eat to 25% to 35% of your total daily calories  Limit sodium to less than 2,300 mg each day  Healthy fats:  Healthy fats can help improve cholesterol levels  The risk for heart disease is decreased when cholesterol levels are normal  Choose healthy fats, such as the following:  · Unsaturated fat  is found in foods such as soybean, canola, olive, corn, and safflower oils  It is also found in soft tub margarine that is made with liquid vegetable oil  · Omega-3 fat  is found in certain fish, such as salmon, tuna, and trout, and in walnuts and flaxseed  Unhealthy fats:  Unhealthy fats can cause unhealthy cholesterol levels in your blood and increase your risk of heart disease  Limit unhealthy fats, such as the following:  · Cholesterol  is found in animal foods, such as eggs and lobster, and in dairy products made from whole milk  Limit cholesterol to less than 300 milligrams (mg) each day  You may need to limit cholesterol to 200 mg each day if you have heart disease  · Saturated fat  is found in meats, such as toro and hamburger  It is also found in chicken or turkey skin, whole milk, and butter  Limit saturated fat to less than 7% of your total daily calories  Limit saturated fat to less than 6% if you have heart disease or are at increased risk for it  · Trans fat  is found in packaged foods, such as potato chips and cookies  It is also in hard margarine, some fried foods, and shortening  Avoid trans fats as much as possible    Heart healthy foods and drinks to include:  Ask your dietitian or healthcare provider how many servings to have from each of the following food groups:  · Grains:      ¨ Whole-wheat breads, cereals, and pastas, and brown rice    ¨ Low-fat, low-sodium crackers and chips    · Vegetables:      ¨ Broccoli, green beans, green peas, and spinach    ¨ Collards, kale, and lima beans    ¨ Carrots, sweet potatoes, tomatoes, and peppers    ¨ Canned vegetables with no salt added    · Fruits:      ¨ Bananas, peaches, pears, and pineapple    ¨ Grapes, raisins, and dates    ¨ Oranges, tangerines, grapefruit, orange juice, and grapefruit juice    ¨ Apricots, mangoes, melons, and papaya    ¨ Raspberries and strawberries    ¨ Canned fruit with no added sugar    · Low-fat dairy products:      ¨ Nonfat (skim) milk, 1% milk, and low-fat almond, cashew, or soy milks fortified with calcium    ¨ Low-fat cheese, regular or frozen yogurt, and cottage cheese    · Meats and proteins , such as lean cuts of beef and pork (loin, leg, round), skinless chicken and turkey, legumes, soy products, egg whites, and nuts  Foods and drinks to limit or avoid:  Ask your dietitian or healthcare provider about these and other foods that are high in unhealthy fat, sodium, and sugar:  · Snack or packaged foods , such as frozen dinners, cookies, macaroni and cheese, and cereals with more than 300 mg of sodium per serving    · Canned or dry mixes  for cakes, soups, sauces, or gravies    · Vegetables with added sodium , such as instant potatoes, vegetables with added sauces, or regular canned vegetables    · Other foods high in sodium , such as ketchup, barbecue sauce, salad dressing, pickles, olives, soy sauce, and miso    · High-fat dairy foods  such as whole or 2% milk, cream cheese, or sour cream, and cheeses     · High-fat protein foods  such as high-fat cuts of beef (T-bone steaks, ribs), chicken or turkey with skin, and organ meats, such as liver    · Cured or smoked meats , such as hot dogs, toro, and sausage    · Unhealthy fats and oils , such as butter, stick margarine, shortening, and cooking oils such as coconut or palm oil    · Food and drinks high in sugar , such as soft drinks (soda), sports drinks, sweetened tea, candy, cake, cookies, pies, and doughnuts  Other diet guidelines to follow:   · Eat more foods containing omega-3 fats  Eat fish high in omega-3 fats at least 2 times a week  · Limit alcohol  Too much alcohol can damage your heart and raise your blood pressure  Women should limit alcohol to 1 drink a day  Men should limit alcohol to 2 drinks a day  A drink of alcohol is 12 ounces of beer, 5 ounces of wine, or 1½ ounces of liquor  · Choose low-sodium foods  High-sodium foods can lead to high blood pressure  Add little or no salt to food you prepare  Use herbs and spices in place of salt  · Eat more fiber  to help lower cholesterol levels  Eat at least 5 servings of fruits and vegetables each day  Eat 3 ounces of whole-grain foods each day  Legumes (beans) are also a good source of fiber  Lifestyle guidelines:   · Do not smoke  Nicotine and other chemicals in cigarettes and cigars can cause lung and heart damage  Ask your healthcare provider for information if you currently smoke and need help to quit  E-cigarettes or smokeless tobacco still contain nicotine  Talk to your healthcare provider before you use these products  · Exercise regularly  to help you maintain a healthy weight and improve your blood pressure and cholesterol levels  Ask your healthcare provider about the best exercise plan for you  Do not start an exercise program without asking your healthcare provider  Follow up with your healthcare provider as directed:  Write down your questions so you remember to ask them during your visits  © 2017 [employer] [place] Information is for End User's use only and may not be sold, redistributed or otherwise used for commercial purposes  All illustrations and images included in CareNotes® are the copyrighted property of [employer] Inc  or [employer] Nu[employer]  The above information is an  only  It is not intended as medical advice for individual conditions or treatments   Talk to your doctor, nurse or pharmacist before following any medical regimen to see if it is safe and effective for you

## 2019-02-26 DIAGNOSIS — I10 ESSENTIAL HYPERTENSION: ICD-10-CM

## 2019-02-26 RX ORDER — LISINOPRIL AND HYDROCHLOROTHIAZIDE 20; 12.5 MG/1; MG/1
TABLET ORAL
Qty: 90 TABLET | Refills: 1 | Status: SHIPPED | OUTPATIENT
Start: 2019-02-26 | End: 2019-08-13 | Stop reason: CLARIF

## 2019-03-14 DIAGNOSIS — E78.2 MIXED HYPERLIPIDEMIA: ICD-10-CM

## 2019-03-14 RX ORDER — ROSUVASTATIN CALCIUM 20 MG/1
TABLET, COATED ORAL
Qty: 30 TABLET | Refills: 0 | Status: SHIPPED | OUTPATIENT
Start: 2019-03-14 | End: 2019-04-21 | Stop reason: SDUPTHER

## 2019-04-16 DIAGNOSIS — I10 ESSENTIAL HYPERTENSION: ICD-10-CM

## 2019-04-16 RX ORDER — METOPROLOL SUCCINATE 50 MG/1
TABLET, EXTENDED RELEASE ORAL
Qty: 90 TABLET | Refills: 1 | Status: SHIPPED | OUTPATIENT
Start: 2019-04-16 | End: 2019-10-20 | Stop reason: SDUPTHER

## 2019-04-21 DIAGNOSIS — E78.2 MIXED HYPERLIPIDEMIA: ICD-10-CM

## 2019-04-21 RX ORDER — ROSUVASTATIN CALCIUM 20 MG/1
TABLET, COATED ORAL
Qty: 90 TABLET | Refills: 0 | Status: SHIPPED | OUTPATIENT
Start: 2019-04-21 | End: 2019-07-15 | Stop reason: SDUPTHER

## 2019-05-02 ENCOUNTER — TELEPHONE (OUTPATIENT)
Dept: FAMILY MEDICINE CLINIC | Facility: CLINIC | Age: 69
End: 2019-05-02

## 2019-05-02 DIAGNOSIS — K21.9 GERD WITHOUT ESOPHAGITIS: Primary | ICD-10-CM

## 2019-05-02 RX ORDER — OMEPRAZOLE 20 MG/1
20 CAPSULE, DELAYED RELEASE ORAL DAILY
Qty: 30 CAPSULE | Refills: 0 | Status: SHIPPED | OUTPATIENT
Start: 2019-05-02 | End: 2019-08-13 | Stop reason: ALTCHOICE

## 2019-05-27 DIAGNOSIS — K21.9 GERD WITHOUT ESOPHAGITIS: ICD-10-CM

## 2019-05-27 RX ORDER — OMEPRAZOLE 20 MG/1
CAPSULE, DELAYED RELEASE ORAL
Qty: 30 CAPSULE | Refills: 0 | OUTPATIENT
Start: 2019-05-27

## 2019-05-31 ENCOUNTER — HOSPITAL ENCOUNTER (OUTPATIENT)
Dept: RADIOLOGY | Age: 69
Discharge: HOME | End: 2019-05-31
Payer: MEDICARE

## 2019-05-31 DIAGNOSIS — M85.89 OSTEOPENIA OF MULTIPLE SITES: ICD-10-CM

## 2019-05-31 PROCEDURE — 77080 DXA BONE DENSITY AXIAL: CPT

## 2019-07-15 DIAGNOSIS — E78.2 MIXED HYPERLIPIDEMIA: ICD-10-CM

## 2019-07-15 RX ORDER — ROSUVASTATIN CALCIUM 20 MG/1
TABLET, COATED ORAL
Qty: 90 TABLET | Refills: 3 | Status: SHIPPED | OUTPATIENT
Start: 2019-07-15 | End: 2019-07-17 | Stop reason: SDUPTHER

## 2019-07-16 ENCOUNTER — OFFICE VISIT (OUTPATIENT)
Dept: URGENT CARE | Facility: MEDICAL CENTER | Age: 69
End: 2019-07-16
Payer: MEDICARE

## 2019-07-16 ENCOUNTER — TELEPHONE (OUTPATIENT)
Dept: FAMILY MEDICINE CLINIC | Facility: CLINIC | Age: 69
End: 2019-07-16

## 2019-07-16 VITALS
HEIGHT: 63 IN | HEART RATE: 76 BPM | SYSTOLIC BLOOD PRESSURE: 152 MMHG | RESPIRATION RATE: 18 BRPM | TEMPERATURE: 98 F | BODY MASS INDEX: 25.16 KG/M2 | OXYGEN SATURATION: 98 % | WEIGHT: 142 LBS | DIASTOLIC BLOOD PRESSURE: 80 MMHG

## 2019-07-16 DIAGNOSIS — J01.10 ACUTE NON-RECURRENT FRONTAL SINUSITIS: Primary | ICD-10-CM

## 2019-07-16 PROCEDURE — 99213 OFFICE O/P EST LOW 20 MIN: CPT | Performed by: PHYSICIAN ASSISTANT

## 2019-07-16 PROCEDURE — G0463 HOSPITAL OUTPT CLINIC VISIT: HCPCS | Performed by: PHYSICIAN ASSISTANT

## 2019-07-16 RX ORDER — AMOXICILLIN AND CLAVULANATE POTASSIUM 875; 125 MG/1; MG/1
1 TABLET, FILM COATED ORAL EVERY 12 HOURS SCHEDULED
Qty: 14 TABLET | Refills: 0 | Status: SHIPPED | OUTPATIENT
Start: 2019-07-16 | End: 2019-07-23

## 2019-07-16 NOTE — TELEPHONE ENCOUNTER
Patient called   She received letter in the mail from us and would like to proceed with cologuard testing

## 2019-07-16 NOTE — PATIENT INSTRUCTIONS
Sinusitis  augmentin twice daily x 7 days  Steam from shower  Follow up with PCP in 3-5 days  Rhinosinusitis   WHAT YOU NEED TO KNOW:   Rhinosinusitis (RS) is inflammation of your nose and sinuses  It commonly begins as a virus, often as a common cold  Viruses usually last 7 to 10 days and do not need treatment  When the virus does not get better on its own, you may have bacterial RS  This means that bacteria have begun to grow inside your sinuses  Acute RS lasts less than 4 weeks  Chronic RS lasts 12 weeks or more  Recurrent RS is when you have 4 or more episodes of RS in one year  DISCHARGE INSTRUCTIONS:   Return to the emergency department if:   · Your eye and eyelid are red, swollen, and painful  · You cannot open your eye  · You have double vision or you cannot see  · Your eyeball bulges out or you cannot move your eye  · You are more sleepy than normal or you notice changes in your ability to think, move, or talk  · You have a stiff neck, a fever, or a bad headache  · You have swelling of your forehead or scalp  Contact your healthcare provider if:   · Your symptoms are worse or do not improve after 3 to 5 days of treatment  · You have questions or concerns about your condition or care  Medicines: You may need any of the following:  · Acetaminophen  decreases pain and fever  It is available without a doctor's order  Ask how much to take and how often to take it  Follow directions  Acetaminophen can cause liver damage if not taken correctly  · NSAIDs , such as ibuprofen, help decrease swelling, pain, and fever  This medicine is available with or without a doctor's order  NSAIDs can cause stomach bleeding or kidney problems in certain people  If you take blood thinner medicine, always ask your healthcare provider if NSAIDs are safe for you  Always read the medicine label and follow directions      · Nasal steroid sprays  decrease inflammation in your nose and sinuses  · Decongestants  reduce swelling and drain mucus in the nose and sinuses  They may help you breathe easier  · Antihistamines  dry mucus in the nose and relieve sneezing  · Antibiotics  treat a bacterial infection and may be needed if your symptoms do not improve or they get worse  · Take your medicine as directed  Contact your healthcare provider if you think your medicine is not helping or if you have side effects  Tell him or her if you are allergic to any medicine  Keep a list of the medicines, vitamins, and herbs you take  Include the amounts, and when and why you take them  Bring the list or the pill bottles to follow-up visits  Carry your medicine list with you in case of an emergency  Self-care:   · Rinse your sinuses  Use a sinus rinse device to rinse your nasal passages with a saline (salt water) solution  This will help thin the mucus in your nose and rinse away pollen and dirt  It will also help reduce swelling so you can breathe normally  Ask your healthcare provider how often to do this  · Breathe in steam   Heat a bowl of water until you see steam  Lean over the bowl and make a tent over your head with a large towel  Breathe deeply for about 20 minutes  Be careful not to get too close to the steam or burn yourself  Do this 3 times a day  You can also breathe deeply when you take a hot shower  · Sleep with your head elevated  Place an extra pillow under your head before you go to sleep to help your sinuses drain  · Drink liquids as directed  Ask your healthcare provider how much liquid to drink each day and which liquids are best for you  Liquids will thin the mucus in your nose and help it drain  Avoid drinks that contain alcohol or caffeine  · Do not smoke, and avoid secondhand smoke  Nicotine and other chemicals in cigarettes and cigars can make your symptoms worse  Ask your healthcare provider for information if you currently smoke and need help to quit  E-cigarettes or smokeless tobacco still contain nicotine  Talk to your healthcare provider before you use these products  Follow up with your healthcare provider as directed: Follow up if your symptoms are worse or not better after 3 to 5 days of treatment  Write down your questions so you remember to ask them during your visits  © 2017 [employer] [employer] [place] Information is for End User's use only and may not be sold, redistributed or otherwise used for commercial purposes  All illustrations and images included in CareNotes® are the copyrighted property of [employer]  or [employer] Nu[employer]  The above information is an  only  It is not intended as medical advice for individual conditions or treatments  Talk to your doctor, nurse or pharmacist before following any medical regimen to see if it is safe and effective for you

## 2019-07-16 NOTE — PROGRESS NOTES
[facility]        NAME: Sapna Coats is a 77 y o  female  : 1950    MRN: [id number]  DATE: 2019  TIME: 3:10 PM    Assessment and Plan   Acute non-recurrent frontal sinusitis [J01 10]  1  Acute non-recurrent frontal sinusitis  amoxicillin-clavulanate (AUGMENTIN) 875-125 mg per tablet         Patient Instructions     Sinusitis  augmentin twice daily x 7 days  Steam from shower  Follow up with PCP in 3-5 days  Proceed to  ER if symptoms worsen  Chief Complaint     Chief Complaint   Patient presents with    Sinusitis     x 1 week  having sinus pressure on left side  taking claritin   History of Present Illness       76 y/o female presents c/o sinus pressure, sinus pain, yellow nasal congestion x 10 days  States she tried over the counter medications with no relief      Review of Systems   Review of Systems   Constitutional: Negative for activity change, appetite change, chills, diaphoresis, fatigue and fever  HENT: Positive for congestion, rhinorrhea, sinus pressure, sinus pain and sore throat  Negative for ear discharge, ear pain, facial swelling, hearing loss, mouth sores, nosebleeds, postnasal drip, sneezing and voice change  Respiratory: Negative for apnea, cough, choking, chest tightness, shortness of breath, wheezing and stridor  Cardiovascular: Negative            Current Medications       Current Outpatient Medications:     aspirin 81 MG tablet, Take 1 tablet by mouth daily, Disp: , Rfl:     calcium citrate-vitamin D (CITRACAL+D) 315-200 MG-UNIT per tablet, Take 1 tablet by mouth daily, Disp: , Rfl:     fluticasone (FLONASE) 50 mcg/act nasal spray, 1 spray into each nostril Twice daily, Disp: , Rfl:     lisinopril-hydrochlorothiazide (PRINZIDE,ZESTORETIC) 20-12 5 MG per tablet, TAKE 1 TABLET DAILY, Disp: 90 tablet, Rfl: 1    loratadine (CLARITIN) 10 mg tablet, Take 1 tablet by mouth daily as needed, Disp: , Rfl:     metoprolol succinate (TOPROL-XL) 50 mg 24 hr tablet, TAKE 1 TABLET DAILY, Disp: 90 tablet, Rfl: 1    Omega-3 Fatty Acids (FISH OIL) 1000 MG CPDR, Take 1 tablet by mouth daily, Disp: , Rfl:     rosuvastatin (CRESTOR) 20 MG tablet, TAKE 1 TABLET (20 MG TOTAL) BY MOUTH DAILY AT BEDTIME, Disp: 90 tablet, Rfl: 3    amoxicillin-clavulanate (AUGMENTIN) 875-125 mg per tablet, Take 1 tablet by mouth every 12 (twelve) hours for 7 days, Disp: 14 tablet, Rfl: 0    lisinopril-hydrochlorothiazide (PRINZIDE,ZESTORETIC) 20-12 5 MG per tablet, TAKE 1 TABLET DAILY (Patient not taking: Reported on 7/16/2019), Disp: 90 tablet, Rfl: 0    omeprazole (PriLOSEC) 20 mg delayed release capsule, Take 1 capsule (20 mg total) by mouth daily (Patient not taking: Reported on 7/16/2019), Disp: 30 capsule, Rfl: 0    Current Allergies     Allergies as of 07/16/2019    (No Known Allergies)            The following portions of the patient's history were reviewed and updated as appropriate: allergies, current medications, past family history, past medical history, past social history, past surgical history and problem list      Past Medical History:   Diagnosis Date    Fibrocystic breast disease     Hypercholesterolemia        History reviewed  No pertinent surgical history  Family History   Problem Relation Age of Onset    Hypertension Mother     Thyroid disease Mother     Diabetes Brother     Kidney failure Brother          Medications have been verified  Objective   /80   Pulse 76   Temp 98 °F (36 7 °C)   Resp 18   Ht 5' 3" (1 6 m)   Wt 64 4 kg (142 lb)   SpO2 98%   BMI 25 15 kg/m²        Physical Exam     Physical Exam   Constitutional: She appears well-developed and well-nourished  No distress  HENT:   Head: Normocephalic and atraumatic  Right Ear: Hearing, tympanic membrane, external ear and ear canal normal    Left Ear: Hearing, tympanic membrane, external ear and ear canal normal    Nose: Rhinorrhea present   Left sinus exhibits maxillary sinus tenderness  Mouth/Throat: Uvula is midline, oropharynx is clear and moist and mucous membranes are normal    Neck: Normal range of motion  Neck supple  Cardiovascular: Normal rate, regular rhythm, normal heart sounds and intact distal pulses  Pulmonary/Chest: Effort normal and breath sounds normal  No stridor  No respiratory distress  She has no wheezes  She has no rales  Lymphadenopathy:     She has cervical adenopathy  Skin: She is not diaphoretic

## 2019-07-17 DIAGNOSIS — E78.2 MIXED HYPERLIPIDEMIA: ICD-10-CM

## 2019-07-17 RX ORDER — ROSUVASTATIN CALCIUM 20 MG/1
20 TABLET, COATED ORAL
Qty: 90 TABLET | Refills: 3 | Status: SHIPPED | OUTPATIENT
Start: 2019-07-17 | End: 2020-04-20

## 2019-07-19 DIAGNOSIS — Z12.11 COLON CANCER SCREENING: Primary | ICD-10-CM

## 2019-08-01 LAB
BUN SERPL-MCNC: 13 MG/DL (ref 7–25)
BUN/CREAT SERPL: ABNORMAL (CALC) (ref 6–22)
CALCIUM SERPL-MCNC: 9.7 MG/DL (ref 8.6–10.4)
CHLORIDE SERPL-SCNC: 104 MMOL/L (ref 98–110)
CHOLEST SERPL-MCNC: 109 MG/DL
CHOLEST/HDLC SERPL: 2.7 (CALC)
CO2 SERPL-SCNC: 30 MMOL/L (ref 20–32)
CREAT SERPL-MCNC: 0.85 MG/DL (ref 0.6–0.93)
GLUCOSE SERPL-MCNC: 100 MG/DL (ref 65–99)
HCV AB S/CO SERPL IA: 0.01
HCV AB SERPL QL IA: NORMAL
HDLC SERPL-MCNC: 40 MG/DL
LDLC SERPL CALC-MCNC: 51 MG/DL (CALC)
NONHDLC SERPL-MCNC: 69 MG/DL (CALC)
POTASSIUM SERPL-SCNC: 4.5 MMOL/L (ref 3.5–5.3)
SL AMB EGFR AFRICAN AMERICAN: 80 ML/MIN/1.73M2
SL AMB EGFR NON AFRICAN AMERICAN: 69 ML/MIN/1.73M2
SODIUM SERPL-SCNC: 139 MMOL/L (ref 135–146)
TRIGL SERPL-MCNC: 98 MG/DL

## 2019-08-09 ENCOUNTER — TELEPHONE (OUTPATIENT)
Dept: FAMILY MEDICINE CLINIC | Facility: CLINIC | Age: 69
End: 2019-08-09

## 2019-08-09 DIAGNOSIS — R19.5 POSITIVE COLORECTAL CANCER SCREENING USING COLOGUARD TEST: Primary | ICD-10-CM

## 2019-08-09 NOTE — TELEPHONE ENCOUNTER
----- Message from [facility]  Cortez Hooker MD sent at 8/9/2019  8:28 AM EDT -----  Please call Yessi Obrien with her results  Her Cologuard was positive, I recommend evaluation with colonoscopy

## 2019-08-09 NOTE — TELEPHONE ENCOUNTER
Prasanth, I spoke with patient, she agreed to the colonoscopy and requested referral for Dr Maricarmen Cedeno as she is established with him  Referral placed  36.9

## 2019-08-13 ENCOUNTER — OFFICE VISIT (OUTPATIENT)
Dept: FAMILY MEDICINE CLINIC | Facility: CLINIC | Age: 69
End: 2019-08-13
Payer: MEDICARE

## 2019-08-13 VITALS
RESPIRATION RATE: 17 BRPM | BODY MASS INDEX: 26.59 KG/M2 | TEMPERATURE: 98.7 F | WEIGHT: 144.5 LBS | HEART RATE: 63 BPM | OXYGEN SATURATION: 97 % | HEIGHT: 62 IN | SYSTOLIC BLOOD PRESSURE: 122 MMHG | DIASTOLIC BLOOD PRESSURE: 82 MMHG

## 2019-08-13 DIAGNOSIS — Z12.39 SCREENING FOR BREAST CANCER: ICD-10-CM

## 2019-08-13 DIAGNOSIS — I10 BENIGN ESSENTIAL HYPERTENSION: Primary | ICD-10-CM

## 2019-08-13 DIAGNOSIS — K21.9 GERD WITHOUT ESOPHAGITIS: ICD-10-CM

## 2019-08-13 DIAGNOSIS — M85.89 OSTEOPENIA OF MULTIPLE SITES: ICD-10-CM

## 2019-08-13 DIAGNOSIS — E78.2 MIXED HYPERLIPIDEMIA: ICD-10-CM

## 2019-08-13 DIAGNOSIS — E66.3 OVERWEIGHT (BMI 25.0-29.9): ICD-10-CM

## 2019-08-13 DIAGNOSIS — R73.01 IFG (IMPAIRED FASTING GLUCOSE): ICD-10-CM

## 2019-08-13 DIAGNOSIS — Z00.00 MEDICARE ANNUAL WELLNESS VISIT, SUBSEQUENT: ICD-10-CM

## 2019-08-13 PROCEDURE — G0439 PPPS, SUBSEQ VISIT: HCPCS | Performed by: FAMILY MEDICINE

## 2019-08-13 PROCEDURE — 99214 OFFICE O/P EST MOD 30 MIN: CPT | Performed by: FAMILY MEDICINE

## 2019-08-13 NOTE — ASSESSMENT & PLAN NOTE
May 2019: LUMBAR SPINE L1-L4 : T-score -2 0  LEFT  TOTAL HIP: T-score -1 5  LEFT  FEMORAL NECK: T score -2 1    I recommend that you take either Ergocalciferol (Vit D) 800-1000 units daily with Calcium carbonate 1200mg daily or Calcium citrate 1200mg daily

## 2019-08-13 NOTE — PROGRESS NOTES
FAMILY MEDICINE PROGRESS NOTE  Dana Pickering 68 y o  female   DATE: October 21, 2019     ASSESSMENT and PLAN:  Dana Pcikering is a 68 y o  female with:     Mixed hyperlipidemia  Lab Results   Component Value Date    CHOLESTEROL 109 07/31/2019    HDL 40 (L) 07/31/2019    LDLC 51 07/31/2019    TRIG 98 07/31/2019     Reviewed healthy, low cholesterol diet  Continue Rosuvastatin 20mg    Overweight (BMI 25 0-29  9)  Wt Readings from Last 3 Encounters:   08/13/19 65 5 kg (144 lb 8 oz)   07/16/19 64 4 kg (142 lb)   02/13/19 67 kg (147 lb 9 6 oz)     BMI Counseling: Body mass index is 26 6 kg/m²  Discussed the patient's BMI with her  The BMI is above average  BMI counseling and education was provided to the patient  Nutrition recommendations include reducing portion sizes, consuming healthier snacks and moderation in carbohydrate intake  IFG (impaired fasting glucose)  , recommended low carb    Benign essential hypertension  BP Readings from Last 3 Encounters:   08/13/19 122/82   07/16/19 152/80   02/13/19 148/82     Lab Results   Component Value Date    CREATININE 0 85 07/31/2019     Controlled on current regimen:  Lisinopril/HCTZ 20/12 5mg daily  Metoprolol succinate 50mg daily    Osteopenia of multiple sites  May 2019: LUMBAR SPINE L1-L4 : T-score -2 0  LEFT  TOTAL HIP: T-score -1 5  LEFT  FEMORAL NECK: T score -2 1    I recommend that you take either Ergocalciferol (Vit D) 800-1000 units daily with Calcium carbonate 1200mg daily or Calcium citrate 1200mg daily      GERD without esophagitis  Has been on chronic PPI (Omeprazole 20mg) every day for years, never had an EGD, gets rebound symptoms if she miss a dose  Did not tolerate titration  Will do trial of Tums daily      SUBJECTIVE:  Dana Pickering is a 68 y o  female who presents today with a chief complaint of Medicare Wellness Visit (Subsequent wellness)  Dana Pickering is here for a 6 month follow-up and AWV  She had labs done on 7/31/19 as below  The active chronic medical problems and medications are as below:   1  HTN- self discontinued   2  HLD- well controlled with Rosuvastatin, normal FLP  3  GERD- did not tolerate trial off of PPI, had rebound symptoms, has never had an EGD before  4  Positive Cologuard- pt has been asymptomatic, no rectal bleeding, hematochezia, melena, will be scheduling a colonoscopy with Dr Brenda St    Review of Systems   Eyes: Negative for visual disturbance  Respiratory: Negative for shortness of breath  Cardiovascular: Negative for chest pain and palpitations  Gastrointestinal: Negative for abdominal pain, anal bleeding, blood in stool and constipation  Neurological: Negative for dizziness and light-headedness  I have reviewed the patient's Past Medical History  OBJECTIVE:  /82 (BP Location: Left arm, Patient Position: Sitting, Cuff Size: Large)   Pulse 63   Temp 98 7 °F (37 1 °C)   Resp 17   Ht 5' 1 8" (1 57 m)   Wt 65 5 kg (144 lb 8 oz)   SpO2 97%   Breastfeeding? No   BMI 26 60 kg/m²    Physical Exam   Constitutional: She appears well-developed and well-nourished  No distress  Cardiovascular: Normal rate, regular rhythm and normal heart sounds  Pulmonary/Chest: Effort normal and breath sounds normal  No respiratory distress  She has no wheezes  She has no rales  Skin: She is not diaphoretic  Vitals reviewed  Orders Only on 07/31/2019   Component Date Value Ref Range Status    Total Cholesterol 07/31/2019 109  <200 mg/dL Final    HDL 07/31/2019 40* >50 mg/dL Final    Triglycerides 07/31/2019 98  <150 mg/dL Final    LDL Direct 07/31/2019 51  mg/dL (calc) Final    Comment: Reference range: <100     Desirable range <100 mg/dL for primary prevention;    <70 mg/dL for patients with CHD or diabetic patients   with > or = 2 CHD risk factors       LDL-C is now calculated using the Brant-Flanagan   calculation, which is a validated novel method providing   better accuracy than the Friedewald equation in the   estimation of LDL-C  [facility] [place] [facility]  3784;263(34): 7205-1014   (http://"[employer]/faq/NJO388)      Chol HDLC Ratio 07/31/2019 2 7  <5 0 (calc) Final    Non-HDL Cholesterol 07/31/2019 69  <130 mg/dL (calc) Final    Comment: For patients with diabetes plus 1 major ASCVD risk   factor, treating to a non-HDL-C goal of <100 mg/dL   (LDL-C of <70 mg/dL) is considered a therapeutic   option   Glucose, Random 07/31/2019 100* 65 - 99 mg/dL Final    Comment:               Fasting reference interval     For someone without known diabetes, a glucose value  between 100 and 125 mg/dL is consistent with  prediabetes and should be confirmed with a  follow-up test          BUN 07/31/2019 13  7 - 25 mg/dL Final    Creatinine 07/31/2019 0 85  0 60 - 0 93 mg/dL Final    Comment: For patients >52years of age, the reference limit  for Creatinine is approximately 13% higher for people  identified as -American   eGFR Non  07/31/2019 69  > OR = 60 mL/min/1 73m2 Final    eGFR  07/31/2019 80  > OR = 60 mL/min/1 73m2 Final    SL AMB BUN/CREATININE RATIO 84/91/7898 NOT APPLICABLE  6 - 22 (calc) Final    Sodium 07/31/2019 139  135 - 146 mmol/L Final    Potassium 07/31/2019 4 5  3 5 - 5 3 mmol/L Final    Chloride 07/31/2019 104  98 - 110 mmol/L Final    CO2 07/31/2019 30  20 - 32 mmol/L Final    SL AMB CALCIUM 07/31/2019 9 7  8 6 - 10 4 mg/dL Final    HEP C AB 07/31/2019 NON-REACTIVE  NON-REACTIVE Final    Signal to Cut-Off 07/31/2019 0 01  <1 00 Final    Comment:    HCV antibody was non-reactive  There is no laboratory   evidence of HCV infection  In most cases, no further action is required  However,  if recent HCV exposure is suspected, a test for HCV RNA  (test code 54041) is suggested  For additional information please refer to  http://[employer]/faq/QTF72d1  (This link is being provided for informational/  educational purposes only )            Noel Johnson MD    Note: Portions of the record have been created with voice recognition software  Occasional wrong word or "sound a like" substitutions may have occurred due to the inherent limitations of voice recognition software  Read the chart carefully and recognize, using context, where substitutions have occurred

## 2019-08-13 NOTE — PATIENT INSTRUCTIONS
Obesity   AMBULATORY CARE:   Obesity  is when your body mass index (BMI) is greater than 30  Your healthcare provider will use your height and weight to measure your BMI  The risks of obesity include  many health problems, such as injuries or physical disability  You may need tests to check for the following:  · Diabetes     · High blood pressure or high cholesterol     · Heart disease     · Gallbladder or liver disease     · Cancer of the colon, breast, prostate, liver, or kidney     · Sleep apnea     · Arthritis or gout  Seek care immediately if:   · You have a severe headache, confusion, or difficulty speaking  · You have weakness on one side of your body  · You have chest pain, sweating, or shortness of breath  Contact your healthcare provider if:   · You have symptoms of gallbladder or liver disease, such as pain in your upper abdomen  · You have knee or hip pain and discomfort while walking  · You have symptoms of diabetes, such as intense hunger and thirst, and frequent urination  · You have symptoms of sleep apnea, such as snoring or daytime sleepiness  · You have questions or concerns about your condition or care  Treatment for obesity  focuses on helping you lose weight to improve your health  Even a small decrease in BMI can reduce the risk for many health problems  Your healthcare provider will help you set a weight-loss goal   · Lifestyle changes  are the first step in treating obesity  These include making healthy food choices and getting regular physical activity  Your healthcare provider may suggest a weight-loss program that involves coaching, education, and therapy  · Medicine  may help you lose weight when it is used with a healthy diet and physical activity  · Surgery  can help you lose weight if you are very obese and have other health problems  There are several types of weight-loss surgery  Ask your healthcare provider for more information    Be successful losing weight:   · Set small, realistic goals  An example of a small goal is to walk for 20 minutes 5 days a week  Anther goal is to lose 5% of your body weight  · Tell friends, family members, and coworkers about your goals  and ask for their support  Ask a friend to lose weight with you, or join a weight-loss support group  · Identify foods or triggers that may cause you to overeat , and find ways to avoid them  Remove tempting high-calorie foods from your home and workplace  Place a bowl of fresh fruit on your kitchen counter  If stress causes you to eat, then find other ways to cope with stress  · Keep a diary to track what you eat and drink  Also write down how many minutes of physical activity you do each day  Weigh yourself once a week and record it in your diary  Eating changes: You will need to eat 500 to 1,000 fewer calories each day than you currently eat to lose 1 to 2 pounds a week  The following changes will help you cut calories:  · Eat smaller portions  Use small plates, no larger than 9 inches in diameter  Fill your plate half full of fruits and vegetables  Measure your food using measuring cups until you know what a serving size looks like  · Eat 3 meals and 1 or 2 snacks each day  Plan your meals in advance  Hamida Silence and eat at home most of the time  Eat slowly  · Eat fruits and vegetables at every meal   They are low in calories and high in fiber, which makes you feel full  Do not add butter, margarine, or cream sauce to vegetables  Use herbs to season steamed vegetables  · Eat less fat and fewer fried foods  Eat more baked or grilled chicken and fish  These protein sources are lower in calories and fat than red meat  Limit fast food  Dress your salads with olive oil and vinegar instead of bottled dressing  · Limit the amount of sugar you eat  Do not drink sugary beverages  Limit alcohol  Activity changes:  Physical activity is good for your body in many ways   It helps you burn calories and build strong muscles  It decreases stress and depression, and improves your mood  It can also help you sleep better  Talk to your healthcare provider before you begin an exercise program   · Exercise for at least 30 minutes 5 days a week  Start slowly  Set aside time each day for physical activity that you enjoy and that is convenient for you  It is best to do both weight training and an activity that increases your heart rate, such as walking, bicycling, or swimming  · Find ways to be more active  Do yard work and housecleaning  Walk up the stairs instead of using elevators  Spend your leisure time going to events that require walking, such as outdoor festivals or fairs  This extra physical activity can help you lose weight and keep it off  Follow up with your healthcare provider as directed: You may need to meet with a dietitian  Write down your questions so you remember to ask them during your visits  © 2017 [address] Information is for End User's use only and may not be sold, redistributed or otherwise used for commercial purposes  All illustrations and images included in CareNotes® are the copyrighted property of [employer]  or Omar Nuñez  The above information is an  only  It is not intended as medical advice for individual conditions or treatments  Talk to your doctor, nurse or pharmacist before following any medical regimen to see if it is safe and effective for you  Urinary Incontinence   WHAT YOU NEED TO KNOW:   What is urinary incontinence? Urinary incontinence (UI) is when you lose control of your bladder  What causes UI? UI occurs because your bladder cannot store or empty urine properly  The following are the most common types of UI:  · Stress incontinence  is when you leak urine due to increased bladder pressure  This may happen when you cough, sneeze, or exercise       · Urge incontinence  is when you feel the need to urinate right away and leak urine accidentally  · Mixed incontinence  is when you have both stress and urge UI  What are the signs and symptoms of UI?   · You feel like your bladder does not empty completely when you urinate  · You urinate often and need to urinate immediately  · You leak urine when you sleep, or you wake up with the urge to urinate  · You leak urine when you cough, sneeze, exercise, or laugh  How is UI diagnosed? Your healthcare provider will ask how often you leak urine and whether you have stress or urge symptoms  Tell him which medicines you take, how often you urinate, and how much liquid you drink each day  You may need any of the following tests:  · Urine tests  may show infection or kidney function  · A pelvic exam  may be done to check for blockages  A pelvic exam will also show if your bladder, uterus, or other organs have moved out of place  · An x-ray, ultrasound, or CT  may show problems with parts of your urinary system  You may be given contrast liquid to help your organs show up better in the pictures  Tell the healthcare provider if you have ever had an allergic reaction to contrast liquid  Do not enter the MRI room with anything metal  Metal can cause serious injury  Tell the healthcare provider if you have any metal in or on your body  · A bladder scan  will show how much urine is left in your bladder after you urinate  You will be asked to urinate and then healthcare providers will use a small ultrasound machine to check the urine left in your bladder  · Cystometry  is used to check the function of your urinary system  Your healthcare provider checks the pressure in your bladder while filling it with fluid  Your bladder pressure may also be tested when your bladder is full and while you urinate  How is UI treated? · Medicines  can help strengthen your bladder control      · Electrical stimulation  is used to send a small amount of electrical energy to your pelvic floor muscles  This helps control your bladder function  Electrodes may be placed outside your body or in your rectum  For women, the electrodes may be placed in the vagina  · A bulking agent  may be injected into the wall of your urethra to make it thicker  This helps keep your urethra closed and decreases urine leakage  · Devices  such as a clamp, pessary, or tampon may help stop urine leaks  Ask your healthcare provider for more information about these and other devices  · Surgery  may be needed if other treatments do not work  Several types of surgery can help improve your bladder control  Ask your healthcare provider for more information about the surgery you may need  How can I manage my symptoms? · Do pelvic muscle exercises often  Your pelvic muscles help you stop urinating  Squeeze these muscles tight for 5 seconds, then relax for 5 seconds  Gradually work up to squeezing for 10 seconds  Do 3 sets of 15 repetitions a day, or as directed  This will help strengthen your pelvic muscles and improve bladder control  · A catheter  may be used to help empty your bladder  A catheter is a tiny, plastic tube that is put into your bladder to drain your urine  Your healthcare provider may tell you to use a catheter to prevent your bladder from getting too full and leaking urine  · Keep a UI record  Write down how often you leak urine and how much you leak  Make a note of what you were doing when you leaked urine  · Train your bladder  Go to the bathroom at set times, such as every 2 hours, even if you do not feel the urge to go  You can also try to hold your urine when you feel the urge to go  For example, hold your urine for 5 minutes when you feel the urge to go  As that becomes easier, hold your urine for 10 minutes  · Drink liquids as directed  Ask your healthcare provider how much liquid to drink each day and which liquids are best for you   You may need to limit the amount of liquid you drink to help control your urine leakage  Limit or do not have drinks that contain caffeine or alcohol  Do not drink any liquid right before you go to bed  · Prevent constipation  Eat a variety of high-fiber foods  Good examples are high-fiber cereals, beans, vegetables, and whole-grain breads  Prune juice may help make your bowel movement softer  Walking is the best way to trigger your intestines to have a bowel movement  · Exercise regularly and maintain a healthy weight  Ask your healthcare provider how much you should weigh and about the best exercise plan for you  Weight loss and exercise will decrease pressure on your bladder and help you control your leakage  Ask him to help you create a weight loss plan if you are overweight  When should I seek immediate care? · You have severe pain  · You are confused or cannot think clearly  When should I contact my healthcare provider? · You have a fever  · You see blood in your urine  · You have pain when you urinate  · You have new or worse pain, even after treatment  · Your mouth feels dry or you have vision changes  · Your urine is cloudy or smells bad  · You have questions or concerns about your condition or care  CARE AGREEMENT:   You have the right to help plan your care  Learn about your health condition and how it may be treated  Discuss treatment options with your caregivers to decide what care you want to receive  You always have the right to refuse treatment  The above information is an  only  It is not intended as medical advice for individual conditions or treatments  Talk to your doctor, nurse or pharmacist before following any medical regimen to see if it is safe and effective for you  © 2017 [address] Information is for End User's use only and may not be sold, redistributed or otherwise used for commercial purposes   All illustrations and images included in CareNotes® are the copyrighted property of [employer]  or Omar Nuñez  Cigarette Smoking and Your Health   AMBULATORY CARE:   Risks to your health if you smoke:  Nicotine and other chemicals found in tobacco damage every cell in your body  Even if you are a light smoker, you have an increased risk for cancer, heart disease, and lung disease  If you are pregnant or have diabetes, smoking increases your risk for complications  Benefits to your health if you stop smoking:   · You decrease respiratory symptoms such as coughing, wheezing, and shortness of breath  · You reduce your risk for cancers of the lung, mouth, throat, kidney, bladder, pancreas, stomach, and cervix  If you already have cancer, you increase the benefits of chemotherapy  You also reduce your risk for cancer returning or a second cancer from developing  · You reduce your risk for heart disease, blood clots, heart attack, and stroke  · You reduce your risk for lung infections, and diseases such as pneumonia, asthma, chronic bronchitis, and emphysema  · Your circulation improves  More oxygen can be delivered to your body  If you have diabetes, you lower your risk for complications, such as kidney, artery, and eye diseases  You also lower your risk for nerve damage  Nerve damage can lead to amputations, poor vision, and blindness  · You improve your body's ability to heal and to fight infections  Benefits to the health of others if you stop smoking:  Tobacco is harmful to nonsmokers who breathe in your secondhand smoke  The following are ways the health of others around you may improve when you stop smoking:  · You lower the risks for lung cancer and heart disease in nonsmoking adults  · If you are pregnant, you lower the risk for miscarriage, early delivery, low birth weight, and stillbirth  You also lower your baby's risk for SIDS, obesity, developmental delay, and neurobehavioral problems, such as ADHD  · If you have children, you lower their risk for ear infections, colds, pneumonia, bronchitis, and asthma  For more information and support to stop smoking:   · [employer]  Phone: 3- 541 - 078-8948  Web Address: www smokefree  gov  Follow up with your healthcare provider as directed:  Write down your questions so you remember to ask them during your visits  © 2017 [address] Information is for End User's use only and may not be sold, redistributed or otherwise used for commercial purposes  All illustrations and images included in CareNotes® are the copyrighted property of [employer] M [employer]  or Omar Nuñez  The above information is an  only  It is not intended as medical advice for individual conditions or treatments  Talk to your doctor, nurse or pharmacist before following any medical regimen to see if it is safe and effective for you  Fall Prevention   AMBULATORY CARE:   Fall prevention  includes ways to make your home and other areas safer  It also includes ways you can move more carefully to prevent a fall  Health conditions that cause changes in your blood pressure, vision, or muscle strength and coordination may increase your risk for falls  Medicines may also increase your risk for falls if they make you dizzy, weak, or sleepy  Call 911 or have someone else call if:   · You have fallen and are unconscious  · You have fallen and cannot move part of your body  Contact your healthcare provider if:   · You have fallen and have pain or a headache  · You have questions or concerns about your condition or care  Fall prevention tips:   · Stand or sit up slowly  This may help you keep your balance and prevent falls  · Use assistive devices as directed  Your healthcare provider may suggest that you use a cane or walker to help you keep your balance  You may need to have grab bars put in your bathroom near the toilet or in the shower      · Wear shoes that fit well and have soles that   Wear shoes both inside and outside  Use slippers with good   Do not wear shoes with high heels  · Wear a personal alarm  This is a device that allows you to call 911 if you fall and need help  Ask your healthcare provider for more information  · Stay active  Exercise can help strengthen your muscles and improve your balance  Your healthcare provider may recommend water aerobics or walking  He or she may also recommend physical therapy to improve your coordination  Never start an exercise program without talking to your healthcare provider first      · Manage your medical conditions  Keep all appointments with your healthcare providers  Visit your eye doctor as directed  Home safety tips:   · Add items to prevent falls in the bathroom  Put nonslip strips on your bath or shower floor to prevent you from slipping  Use a bath mat if you do not have carpet in the bathroom  This will prevent you from falling when you step out of the bath or shower  Use a shower seat so you do not need to stand while you shower  Sit on the toilet or a chair in your bathroom to dry yourself and put on clothing  This will prevent you from losing your balance from drying or dressing yourself while you are standing  · Keep paths clear  Remove books, shoes, and other objects from walkways and stairs  Place cords for telephones and lamps out of the way so that you do not need to walk over them  Tape them down if you cannot move them  Remove small rugs  If you cannot remove a rug, secure it with double-sided tape  This will prevent you from tripping  · Install bright lights in your home  Use night lights to help light paths to the bathroom or kitchen  Always turn on the light before you start walking  · Keep items you use often on shelves within reach  Do not use a step stool to help you reach an item  · Paint or place reflective tape on the edges of your stairs    This will help you see the stairs better  Follow up with your healthcare provider as directed:  Write down your questions so you remember to ask them during your visits  © 2017 [address] Information is for End User's use only and may not be sold, redistributed or otherwise used for commercial purposes  All illustrations and images included in CareNotes® are the copyrighted property of [employer]  or Omar Nuñez  The above information is an  only  It is not intended as medical advice for individual conditions or treatments  Talk to your doctor, nurse or pharmacist before following any medical regimen to see if it is safe and effective for you  Advance Directives   WHAT YOU NEED TO KNOW:   What are advance directives? Advance directives are legal documents that state your wishes and plans for medical care  These plans are made ahead of time in case you lose your ability to make decisions for yourself  Advance directives can apply to any medical decision, such as the treatments you want, and if you want to donate organs  What are the types of advance directives? There are many types of advance directives, and each state has rules about how to use them  You may choose a combination of any of the following:  · Living will: This is a written record of the treatment you want  You can also choose which treatments you do not want, which to limit, and which to stop at a certain time  This includes surgery, medicine, IV fluid, and tube feedings  · Durable power of  for healthcare [facility]): This is a written record that states who you want to make healthcare choices for you when you are unable to make them for yourself  This person, called a proxy, is usually a family member or a friend  You may choose more than 1 proxy  · Do not resuscitate (DNR) order:  A DNR order is used in case your heart stops beating or you stop breathing   It is a request not to have certain forms of treatment, such as CPR  A DNR order may be included in other types of advance directives  · Medical directive: This covers the care that you want if you are in a coma, near death, or unable to make decisions for yourself  You can list the treatments you want for each condition  Treatment may include pain medicine, surgery, blood transfusions, dialysis, IV or tube feedings, and a ventilator (breathing machine)  · Values history: This document has questions about your views, beliefs, and how you feel and think about life  This information can help others choose the care that you would choose  Why are advance directives important? An advance directive helps you control your care  Although spoken wishes may be used, it is better to have your wishes written down  Spoken wishes can be misunderstood, or not followed  Treatments may be given even if you do not want them  An advance directive may make it easier for your family to make difficult choices about your care  How do I decide what to put in my advance directives? · Make informed decisions:  Make sure you fully understand treatments or care you may receive  Think about the benefits and problems your decisions could cause for you or your family  Talk to healthcare providers if you have concerns or questions before you write down your wishes  You may also want to talk with your Judaism or , or a   Check your state laws to make sure that what you put in your advance directive is legal      · Sign all forms:  Sign and date your advance directive when you have finished  You may also need 2 witnesses to sign the forms  Witnesses cannot be your doctor or his staff, your spouse, heirs or beneficiaries, people you owe money to, or your chosen proxy  Talk to your family, proxy, and healthcare providers about your advance directive  Give each person a copy, and keep one for yourself in a place you can get to easily   Do not keep it hidden or locked away  · Review and revise your plans: You can revise your advance directive at any time, as long as you are able to make decisions  Review your plan every year, and when there are changes in your life, or your health  When you make changes, let your family, proxy, and healthcare providers know  Give each a new copy  Where can I find more information? · [employer]  [address] , [address]  Phone: 7- 930 - [phone]  Phone: 7- 727 - [phone]  Web Address: http://www  aafp org  · [address] [facility])  [address] [facility], [address] [facility] , [address] [place] [address]  Phone: [phone]  Phone: [phone]  Web Address: Jose murdock  CARE AGREEMENT:   You have the right to help plan your care  To help with this plan, you must learn about your health condition and treatment options  You must also learn about advance directives and how they are used  Work with your healthcare providers to decide what care will be used to treat you  You always have the right to refuse treatment  The above information is an  only  It is not intended as medical advice for individual conditions or treatments  Talk to your doctor, nurse or pharmacist before following any medical regimen to see if it is safe and effective for you  © 2017 [address] Information is for End User's use only and may not be sold, redistributed or otherwise used for commercial purposes  All illustrations and images included in CareNotes® are the copyrighted property of [employer]  or Omar Nuñez

## 2019-08-13 NOTE — ASSESSMENT & PLAN NOTE
Lab Results   Component Value Date    CHOLESTEROL 109 07/31/2019    HDL 40 (L) 07/31/2019    LDLC 51 07/31/2019    TRIG 98 07/31/2019     Reviewed healthy, low cholesterol diet  Continue Rosuvastatin 20mg

## 2019-08-13 NOTE — ASSESSMENT & PLAN NOTE
BP Readings from Last 3 Encounters:   08/13/19 122/82   07/16/19 152/80   02/13/19 148/82     Lab Results   Component Value Date    CREATININE 0 85 07/31/2019     Controlled on current regimen:  Lisinopril/HCTZ 20/12 5mg daily  Metoprolol succinate 50mg daily

## 2019-08-13 NOTE — ASSESSMENT & PLAN NOTE
Wt Readings from Last 3 Encounters:   08/13/19 65 5 kg (144 lb 8 oz)   07/16/19 64 4 kg (142 lb)   02/13/19 67 kg (147 lb 9 6 oz)     BMI Counseling: Body mass index is 26 6 kg/m²  Discussed the patient's BMI with her  The BMI is above average  BMI counseling and education was provided to the patient  Nutrition recommendations include reducing portion sizes, consuming healthier snacks and moderation in carbohydrate intake

## 2019-08-13 NOTE — ASSESSMENT & PLAN NOTE
Has been on chronic PPI (Omeprazole 20mg) every day for years, never had an EGD, gets rebound symptoms if she miss a dose  Did not tolerate titration  Will do trial of Tums daily

## 2019-08-13 NOTE — PROGRESS NOTES
Assessment and Plan:     Problem List Items Addressed This Visit        Digestive    GERD without esophagitis       Cardiovascular and Mediastinum    Benign essential hypertension - Primary       Other    Mixed hyperlipidemia    Overweight (BMI 25 0-29  9)         History of Present Illness:     Patient presents for Medicare Annual Wellness visit    Patient Care Team:  Randy Ellis MD as PCP - General (Family Medicine)  LISA Hdz MD Hart Lung, MD     Problem List:     Patient Active Problem List   Diagnosis    Basal cell carcinoma of skin    Benign essential hypertension    Mixed hyperlipidemia    GERD without esophagitis    Osteoarthritis of knee    Osteopenia of multiple sites    Overweight (BMI 25 0-29  9)      Past Medical and Surgical History:     Past Medical History:   Diagnosis Date    Fibrocystic breast disease     Hypercholesterolemia      No past surgical history on file     Family History:     Family History   Problem Relation Age of Onset    Hypertension Mother     Thyroid disease Mother     Diabetes Brother     Kidney failure Brother       Social History:     Social History     Tobacco Use   Smoking Status Never Smoker   Smokeless Tobacco Never Used     Social History     Substance and Sexual Activity   Alcohol Use No     Social History     Substance and Sexual Activity   Drug Use No      Medications and Allergies:     Current Outpatient Medications   Medication Sig Dispense Refill    aspirin 81 MG tablet Take 1 tablet by mouth daily      calcium citrate-vitamin D (CITRACAL+D) 315-200 MG-UNIT per tablet Take 1 tablet by mouth daily      fluticasone (FLONASE) 50 mcg/act nasal spray 1 spray into each nostril Twice daily      lisinopril-hydrochlorothiazide (PRINZIDE,ZESTORETIC) 20-12 5 MG per tablet TAKE 1 TABLET DAILY 90 tablet 0    loratadine (CLARITIN) 10 mg tablet Take 1 tablet by mouth daily as needed      metoprolol succinate (TOPROL-XL) 50 mg 24 hr tablet TAKE 1 TABLET DAILY 90 tablet 1    Omega-3 Fatty Acids (FISH OIL) 1000 MG CPDR Take 1 tablet by mouth daily      rosuvastatin (CRESTOR) 20 MG tablet Take 1 tablet (20 mg total) by mouth daily at bedtime 90 tablet 3     No current facility-administered medications for this visit  No Known Allergies   Immunizations:     Immunization History   Administered Date(s) Administered    DTaP 01/01/2011    DTaP 5 01/01/2011    INFLUENZA 10/15/2015, 09/29/2016, 10/18/2017, 10/25/2018    Influenza Split High Dose Preservative Free IM 10/08/2014, 10/15/2015, 09/29/2016, 10/18/2017    Influenza TIV (IM) 09/05/2012, 09/25/2013    Influenza, high dose seasonal 0 5 mL 10/25/2018    Pneumococcal Conjugate 13-Valent 06/16/2015    Pneumococcal Polysaccharide PPV23 06/12/2014    Varicella 01/01/2009    Zoster 07/16/2013      Medicare Screening Tests and Risk Assessments:     Nora Rivera is here for her Subsequent Wellness visit  Last Medicare Wellness visit information reviewed, patient interviewed and updates made to the record today  Health Risk Assessment:  Patient rates overall health as very good  Patient feels that their physical health rating is Same  Eyesight was rated as Same  Hearing was rated as Same  Patient feels that their emotional and mental health rating is Same  Pain experienced by patient in the last 7 days has been Some  Patient's pain rating has been 3/10  Patient states that she has experienced no weight loss or gain in last 6 months  Emotional/Mental Health:  Patient has not been feeling nervous/anxious  PHQ-9 Depression Screening:    Frequency of the following problems over the past two weeks:      1  Little interest or pleasure in doing things: 0 - not at all      2  Feeling down, depressed, or hopeless: 0 - not at all  PHQ-2 Score: 0          Broken Bones/Falls:     Fall Risk Assessment:    In the past year, patient has experienced: No history of falling in past year          Bladder/Bowel:  Patient has not leaked urine accidently in the last six months  Patient reports no loss of bowel control  Immunizations:  Patient has had a flu vaccination within the last year  Patient has received a pneumonia shot  Patient has received a shingles shot  Patient has received tetanus/diphtheria shot  Home Safety:  Patient does not have trouble with stairs inside or outside of their home  Patient currently reports that there are no safety hazards present in home, working smoke alarms, no working carbon monoxide detectors  Preventative Screenings:   Breast cancer screening performed, colon cancer screen completed, cholesterol screen completed, glaucoma eye exam completed,     Nutrition:  Current diet: Regular and No Added Salt with servings of the following:    Medications:  Patient is currently taking over-the-counter supplements  List of OTC medications includes: see medication list  Patient is able to manage medications  Lifestyle Choices:  Patient reports no tobacco use  Patient has not smoked or used tobacco in the past   Patient reports no alcohol use  Patient drives a vehicle  Patient wears seat belt  Current level of exercise of physical activity described by patient as: moderate  Activities of Daily Living:  Can get out of bed by his or her self, able to dress self, able to make own meals, able to do own shopping, able to bathe self, can do own laundry/housekeeping, can manage own money, pay bills and track expenses    Previous Hospitalizations:  No hospitalization or ED visit in past 12 months        Advanced Directives:  Patient has decided on a power of   Patient has spoken to designated power of   Patient has not completed advanced directive          Preventative Screening/Counseling:      Cardiovascular:      General: Screening Current and Risks and Benefits Discussed      Counseling: Healthy Diet, Healthy Weight and Improve Cholesterol          Diabetes:      General: Risks and Benefits Discussed and Screening Current      Counseling: Healthy Diet, Healthy Weight and Improve Physical Activity          Colorectal Cancer:      General: Risks and Benefits Discussed and Screening Current      Comments: Positive cologuard, will be getting colonoscopy this year with Dr Mahin Bailey:      General: Risks and Benefits Discussed and Screening Current      Comments: September 2018        Cervical Cancer:      General: Risks and Benefits Discussed and Screening Not Indicated      Comments: All normal in the past        Osteoporosis:      General: Screening Current and Risks and Benefits Discussed      Counseling: Calcium and Vitamin D Intake and Regular Weightbearing Exercise          AAA:      General: Screening Not Indicated          Glaucoma:      General: Risks and Benefits Discussed and Screening Current      Comments: Dr Lambert Fuel [facility] [place] October 2018        HIV:      General: Risks and Benefits Discussed and Patient Declines          Hepatitis C:      General: Risks and Benefits Discussed and Screening Current        Advanced Directives:   Patient has living will for healthcare, has durable POA for healthcare, patient has an advanced directive  Information on ACP and/or AD provided  Immunizations:      Influenza: Risks & Benefits Discussed, Influenza UTD This Year and Influenza Recommended Annually      Pneumococcal: Risks & Benefits Discussed and Lifetime Vaccine Completed      Shingrix: Risks & Benefits Discussed, Shingrix Vaccine Needed Today and Patient Declines      Zostavax: Risks & Benefits Discussed and Zostavax Vaccine UTD      TD: Risks & Benefits Discussed and Td Vaccine UTD      TDAP: Risks & Benefits Discussed and Tdap Vaccine UTD      Other Preventative Counseling (Non-Medicare):   Increase physical activity, Nutrition Counseling and Weight reduction discussed

## 2019-08-14 DIAGNOSIS — I10 ESSENTIAL HYPERTENSION: ICD-10-CM

## 2019-08-14 RX ORDER — LISINOPRIL AND HYDROCHLOROTHIAZIDE 20; 12.5 MG/1; MG/1
TABLET ORAL
Qty: 90 TABLET | Refills: 1 | Status: SHIPPED | OUTPATIENT
Start: 2019-08-14 | End: 2020-02-10

## 2019-09-25 ENCOUNTER — HOSPITAL ENCOUNTER (OUTPATIENT)
Dept: RADIOLOGY | Age: 69
Discharge: HOME | End: 2019-09-25
Payer: MEDICARE

## 2019-09-25 VITALS — BODY MASS INDEX: 27.38 KG/M2 | HEIGHT: 61 IN | WEIGHT: 145 LBS

## 2019-09-25 DIAGNOSIS — Z12.39 SCREENING FOR BREAST CANCER: ICD-10-CM

## 2019-09-25 PROCEDURE — 77067 SCR MAMMO BI INCL CAD: CPT

## 2019-10-14 ENCOUNTER — OFFICE VISIT (OUTPATIENT)
Dept: FAMILY MEDICINE CLINIC | Facility: CLINIC | Age: 69
End: 2019-10-14
Payer: MEDICARE

## 2019-10-14 VITALS
RESPIRATION RATE: 16 BRPM | BODY MASS INDEX: 27.3 KG/M2 | SYSTOLIC BLOOD PRESSURE: 108 MMHG | HEART RATE: 88 BPM | TEMPERATURE: 98.8 F | OXYGEN SATURATION: 96 % | WEIGHT: 144.5 LBS | DIASTOLIC BLOOD PRESSURE: 52 MMHG

## 2019-10-14 DIAGNOSIS — B34.9 VIRAL ILLNESS: Primary | ICD-10-CM

## 2019-10-14 PROCEDURE — 87631 RESP VIRUS 3-5 TARGETS: CPT | Performed by: FAMILY MEDICINE

## 2019-10-14 PROCEDURE — 99214 OFFICE O/P EST MOD 30 MIN: CPT | Performed by: FAMILY MEDICINE

## 2019-10-14 RX ORDER — FAMOTIDINE 20 MG/1
TABLET, FILM COATED ORAL
Refills: 2 | COMMUNITY
Start: 2019-09-22 | End: 2019-11-29 | Stop reason: SDUPTHER

## 2019-10-14 RX ORDER — OSELTAMIVIR PHOSPHATE 75 MG/1
75 CAPSULE ORAL 2 TIMES DAILY
Qty: 10 CAPSULE | Refills: 0 | Status: SHIPPED | OUTPATIENT
Start: 2019-10-14 | End: 2019-10-19

## 2019-10-14 RX ORDER — BENZONATATE 100 MG/1
100 CAPSULE ORAL 3 TIMES DAILY PRN
Qty: 30 CAPSULE | Refills: 0 | Status: SHIPPED | OUTPATIENT
Start: 2019-10-14 | End: 2019-10-24

## 2019-10-14 NOTE — PROGRESS NOTES
FAMILY MEDICINE PROGRESS NOTE  Eugenio Brown 68 y o  female   DATE: October 14, 2019     ASSESSMENT and PLAN:  Eugenio Brown is a 68 y o  female with:     1  Viral illness  Likely viral illness, possibly influenza given sudden onset of symptoms and associated headaches and fevers controlled with Tylenol  Add flonase, mucinex and Tessalon for symptom control  Return later for flu vaccine  Start Tamiflu today, if flu swab negative, can d/c meds  Pt requested flu swab due to having a 3year old at home and visitors coming in from out of town  -Mucinex DM 1200mg BID x 1 week  -NSAIDs or Tylenol PRN  -Reviewed supportive measures including intranasal saline, honey, salt water gargles, hot tea  Reinforced good hand hygiene   -Patient agreeable with the plan and expressed understanding  I discucssed signs and symptoms for which to RTC, go to ER or seek urgent medical care  -RTC PRN  - oseltamivir (TAMIFLU) 75 mg capsule; Take 1 capsule (75 mg total) by mouth 2 (two) times a day for 5 days  Dispense: 10 capsule; Refill: 0  - benzonatate (TESSALON PERLES) 100 mg capsule; Take 1 capsule (100 mg total) by mouth 3 (three) times a day as needed for cough for up to 10 days  Dispense: 30 capsule; Refill: 0      Patient agreeable with the plan and expressed understanding  I discucssed signs and symptoms for which to RTC, go to ER or seek urgent medical care  SUBJECTIVE:  Eugenio Brown is a 68 y o  female who presents today with a chief complaint of Cough; Headache; Generalized Body Aches; Earache; and Cold Like Symptoms  URI    This is a new problem  The current episode started yesterday  The problem has been unchanged  The maximum temperature recorded prior to her arrival was 100 4 - 100 9 F  The fever has been present for less than 1 day  Associated symptoms include congestion, coughing and rhinorrhea   Pertinent negatives include no chest pain, diarrhea, ear pain, nausea, sinus pain, sneezing, sore throat, vomiting or wheezing  She has tried decongestant (robitussin, mucinex dm) for the symptoms  The treatment provided mild relief  Review of Systems   Constitutional: Positive for chills, fatigue and fever  HENT: Positive for congestion, postnasal drip and rhinorrhea  Negative for ear pain, sinus pressure, sinus pain, sneezing and sore throat  Respiratory: Positive for cough  Negative for shortness of breath and wheezing  Cardiovascular: Negative for chest pain  Gastrointestinal: Negative for diarrhea, nausea and vomiting  I have reviewed the patient's Past Medical History  OBJECTIVE:  /52   Pulse 88   Temp 98 8 °F (37 1 °C)   Resp 16   Wt 65 5 kg (144 lb 8 oz)   SpO2 96%   BMI 27 30 kg/m²    Physical Exam   Constitutional: She appears ill  No distress  HENT:   Head: Normocephalic and atraumatic  Right Ear: External ear normal    Left Ear: External ear normal    Nose: Right sinus exhibits no maxillary sinus tenderness and no frontal sinus tenderness  Left sinus exhibits no maxillary sinus tenderness and no frontal sinus tenderness  Mouth/Throat: Uvula is midline and mucous membranes are normal  Posterior oropharyngeal erythema present  No oropharyngeal exudate, posterior oropharyngeal edema or tonsillar abscesses  Eyes: Conjunctivae are normal  Right eye exhibits no discharge  Left eye exhibits no discharge  Neck: Normal range of motion  Neck supple  Cardiovascular: Normal rate and normal heart sounds  Pulmonary/Chest: Effort normal and breath sounds normal  No respiratory distress  She has no wheezes  She has no rales  Lymphadenopathy:     She has cervical adenopathy  Skin: She is not diaphoretic  Vitals reviewed  Verna Del Rosario MD    Note: Portions of the record have been created with voice recognition software  Occasional wrong word or "sound a like" substitutions may have occurred due to the inherent limitations of voice recognition software   Read the chart carefully and recognize, using context, where substitutions have occurred

## 2019-10-15 ENCOUNTER — TELEPHONE (OUTPATIENT)
Dept: FAMILY MEDICINE CLINIC | Facility: CLINIC | Age: 69
End: 2019-10-15

## 2019-10-15 LAB
FLUAV AG SPEC QL: NOT DETECTED
FLUBV AG SPEC QL: NOT DETECTED
RSV B RNA SPEC QL NAA+PROBE: NOT DETECTED

## 2019-10-15 NOTE — TELEPHONE ENCOUNTER
----- Message from Juanito Seymour MD sent at 10/15/2019  8:45 AM EDT -----  Please call Carey Gongora with her results   Flu swab negative, she does NOT have the flu, she can stop the Tamiflu, likely another virus as we had discussed

## 2019-10-20 DIAGNOSIS — I10 ESSENTIAL HYPERTENSION: ICD-10-CM

## 2019-10-21 ENCOUNTER — IMMUNIZATIONS (OUTPATIENT)
Dept: FAMILY MEDICINE CLINIC | Facility: CLINIC | Age: 69
End: 2019-10-21
Payer: MEDICARE

## 2019-10-21 DIAGNOSIS — Z23 NEED FOR INFLUENZA VACCINATION: Primary | ICD-10-CM

## 2019-10-21 PROCEDURE — 90662 IIV NO PRSV INCREASED AG IM: CPT

## 2019-10-21 PROCEDURE — G0008 ADMIN INFLUENZA VIRUS VAC: HCPCS

## 2019-10-21 RX ORDER — METOPROLOL SUCCINATE 50 MG/1
TABLET, EXTENDED RELEASE ORAL
Qty: 90 TABLET | Refills: 1 | Status: SHIPPED | OUTPATIENT
Start: 2019-10-21 | End: 2020-04-06

## 2019-11-24 DIAGNOSIS — K21.9 GERD WITHOUT ESOPHAGITIS: ICD-10-CM

## 2019-11-24 RX ORDER — FAMOTIDINE 20 MG/1
TABLET, FILM COATED ORAL
Qty: 60 TABLET | Refills: 2 | OUTPATIENT
Start: 2019-11-24

## 2019-11-29 DIAGNOSIS — K21.9 GERD WITHOUT ESOPHAGITIS: Primary | ICD-10-CM

## 2019-11-29 RX ORDER — FAMOTIDINE 20 MG/1
TABLET, FILM COATED ORAL
Qty: 60 TABLET | Refills: 2 | Status: SHIPPED | OUTPATIENT
Start: 2019-11-29 | End: 2020-02-25

## 2019-11-29 NOTE — TELEPHONE ENCOUNTER
Patient stopped by my window requesting refill on       FAMOTIDINE 20 MG 1 PILL TWICE DAILY #60 [facility] [place] [place]

## 2020-02-10 DIAGNOSIS — I10 ESSENTIAL HYPERTENSION: ICD-10-CM

## 2020-02-10 RX ORDER — LISINOPRIL AND HYDROCHLOROTHIAZIDE 20; 12.5 MG/1; MG/1
TABLET ORAL
Qty: 90 TABLET | Refills: 1 | Status: SHIPPED | OUTPATIENT
Start: 2020-02-10 | End: 2020-07-28

## 2020-02-14 ENCOUNTER — OFFICE VISIT (OUTPATIENT)
Dept: FAMILY MEDICINE CLINIC | Facility: CLINIC | Age: 70
End: 2020-02-14
Payer: MEDICARE

## 2020-02-14 VITALS
HEIGHT: 63 IN | BODY MASS INDEX: 26.22 KG/M2 | TEMPERATURE: 99.2 F | WEIGHT: 148 LBS | OXYGEN SATURATION: 98 % | RESPIRATION RATE: 16 BRPM | HEART RATE: 80 BPM | DIASTOLIC BLOOD PRESSURE: 74 MMHG | SYSTOLIC BLOOD PRESSURE: 120 MMHG

## 2020-02-14 DIAGNOSIS — I10 BENIGN ESSENTIAL HYPERTENSION: Primary | ICD-10-CM

## 2020-02-14 DIAGNOSIS — K21.9 GERD WITHOUT ESOPHAGITIS: ICD-10-CM

## 2020-02-14 DIAGNOSIS — R73.01 IFG (IMPAIRED FASTING GLUCOSE): ICD-10-CM

## 2020-02-14 DIAGNOSIS — E78.2 MIXED HYPERLIPIDEMIA: ICD-10-CM

## 2020-02-14 DIAGNOSIS — M85.89 OSTEOPENIA OF MULTIPLE SITES: ICD-10-CM

## 2020-02-14 PROCEDURE — 1160F RVW MEDS BY RX/DR IN RCRD: CPT | Performed by: FAMILY MEDICINE

## 2020-02-14 PROCEDURE — 3078F DIAST BP <80 MM HG: CPT | Performed by: FAMILY MEDICINE

## 2020-02-14 PROCEDURE — 3008F BODY MASS INDEX DOCD: CPT | Performed by: FAMILY MEDICINE

## 2020-02-14 PROCEDURE — 99214 OFFICE O/P EST MOD 30 MIN: CPT | Performed by: FAMILY MEDICINE

## 2020-02-14 PROCEDURE — 1036F TOBACCO NON-USER: CPT | Performed by: FAMILY MEDICINE

## 2020-02-14 PROCEDURE — 3074F SYST BP LT 130 MM HG: CPT | Performed by: FAMILY MEDICINE

## 2020-02-14 PROCEDURE — 4040F PNEUMOC VAC/ADMIN/RCVD: CPT | Performed by: FAMILY MEDICINE

## 2020-02-14 NOTE — ASSESSMENT & PLAN NOTE
Lab Results   Component Value Date    CHOLESTEROL 109 07/31/2019    HDL 40 (L) 07/31/2019    LDLC 51 07/31/2019    TRIG 98 07/31/2019     Reviewed healthy, low cholesterol diet  Continue Rosuvastatin 20mg  Had severe myalgias with Atorvastatin  Recheck FLP and CMP in 6 months

## 2020-02-14 NOTE — ASSESSMENT & PLAN NOTE
Has weaned off of Omeprazole and now doing well with Pepcid 20mg BID, but gets rebound symptoms if she misses evening dose

## 2020-02-14 NOTE — ASSESSMENT & PLAN NOTE
BP Readings from Last 3 Encounters:   02/14/20 120/74   10/14/19 108/52   08/13/19 122/82     Lab Results   Component Value Date    CREATININE 0 85 07/31/2019     Controlled on current regimen: Lisinopril/HCTZ 20/12 5mg and Metoprolol succinate 50mg daily

## 2020-02-14 NOTE — ASSESSMENT & PLAN NOTE
May 2019: LUMBAR SPINE L1-L4 : T-score -2 0  LEFT  TOTAL HIP: T-score -1 5  LEFT  FEMORAL NECK: T score -2 1    Continue Vit D/Calcium supplements

## 2020-02-14 NOTE — PROGRESS NOTES
FAMILY MEDICINE PROGRESS NOTE  Johnny Patel 68 y o  female   DATE: February 14, 2020     ASSESSMENT and PLAN:  Johnny Patel is a 68 y o  female with:     Mixed hyperlipidemia  Lab Results   Component Value Date    CHOLESTEROL 109 07/31/2019    HDL 40 (L) 07/31/2019    LDLC 51 07/31/2019    TRIG 98 07/31/2019     Reviewed healthy, low cholesterol diet  Continue Rosuvastatin 20mg  Had severe myalgias with Atorvastatin  Recheck FLP and CMP in 6 months    Benign essential hypertension  BP Readings from Last 3 Encounters:   02/14/20 120/74   10/14/19 108/52   08/13/19 122/82     Lab Results   Component Value Date    CREATININE 0 85 07/31/2019     Controlled on current regimen: Lisinopril/HCTZ 20/12 5mg and Metoprolol succinate 50mg daily      GERD without esophagitis  Has weaned off of Omeprazole and now doing well with Pepcid 20mg BID, but gets rebound symptoms if she misses evening dose    IFG (impaired fasting glucose)    Recheck CMP and A1c  Recommend low carb diet    Osteopenia of multiple sites  May 2019: LUMBAR SPINE L1-L4 : T-score -2 0  LEFT  TOTAL HIP: T-score -1 5  LEFT  FEMORAL NECK: T score -2 1    Continue Vit D/Calcium supplements    RTC 6 months for f/u and MAWV, if stable, can consider yearly visits only    SUBJECTIVE:  Johnny Patel is a 68 y o  female who presents today with a chief complaint of Follow-up   Johnny Patel is here for a 6 month follow-up, she did not have labs done prior to this visit  The active chronic medical problems and medications are as below:   1  HTN- on lisinopril/hctz 20/12 5mg and metoprolol succinate 50mg, only monitors her BP at home and usually is well controlled, no chest pain, SOB, LH, dizziness, HA, change in vision  2  HLD- last FLP in July 2019, well controlled on Rosuvastatin 20mg, no ISABELA  3  Pre-diabetes- not on any meds, last , is trying to eat healthy for her carbs and GERD symptoms  4   GERD- has been improving with Pepcid, but will get rebound symptoms if she misses the evening dose    Review of Systems   Eyes: Negative for visual disturbance  Respiratory: Negative for cough and shortness of breath  Cardiovascular: Negative for chest pain and palpitations  Neurological: Negative for dizziness, light-headedness and headaches  I have reviewed the patient's Past Medical History  Current Outpatient Medications:     calcium citrate-vitamin D (CITRACAL+D) 315-200 MG-UNIT per tablet, Take 1 tablet by mouth daily, Disp: , Rfl:     famotidine (PEPCID) 20 mg tablet, I pill twice a day, Disp: 60 tablet, Rfl: 2    fluticasone (FLONASE) 50 mcg/act nasal spray, 1 spray into each nostril Twice daily, Disp: , Rfl:     lisinopril-hydrochlorothiazide (PRINZIDE,ZESTORETIC) 20-12 5 MG per tablet, TAKE 1 TABLET DAILY, Disp: 90 tablet, Rfl: 1    loratadine (CLARITIN) 10 mg tablet, Take 1 tablet by mouth daily as needed, Disp: , Rfl:     metoprolol succinate (TOPROL-XL) 50 mg 24 hr tablet, TAKE 1 TABLET DAILY, Disp: 90 tablet, Rfl: 1    rosuvastatin (CRESTOR) 20 MG tablet, Take 1 tablet (20 mg total) by mouth daily at bedtime, Disp: 90 tablet, Rfl: 3    OBJECTIVE:  /74   Pulse 80   Temp 99 2 °F (37 3 °C)   Resp 16   Ht 5' 3" (1 6 m)   Wt 67 1 kg (148 lb)   SpO2 98%   BMI 26 22 kg/m²    Physical Exam   Constitutional: She is oriented to person, place, and time  She appears well-developed and well-nourished  No distress  Cardiovascular: Normal rate, regular rhythm and normal heart sounds  No murmur heard  Pulmonary/Chest: Effort normal and breath sounds normal  No respiratory distress  She has no wheezes  She has no rales  Musculoskeletal: Normal range of motion  She exhibits no edema (trace ankle edema bilaterally), tenderness or deformity  Neurological: She is alert and oriented to person, place, and time  Skin: She is not diaphoretic  Psychiatric: She has a normal mood and affect  Vitals reviewed      BMI Counseling: There is no height or weight on file to calculate BMI  The BMI is above normal  Nutrition recommendations include moderation in carbohydrate intake and reducing intake of cholesterol  Juanito Seymour MD    Note: Portions of the record have been created with voice recognition software  Occasional wrong word or "sound a like" substitutions may have occurred due to the inherent limitations of voice recognition software  Read the chart carefully and recognize, using context, where substitutions have occurred

## 2020-02-25 DIAGNOSIS — K21.9 GERD WITHOUT ESOPHAGITIS: ICD-10-CM

## 2020-02-25 RX ORDER — FAMOTIDINE 20 MG/1
TABLET, FILM COATED ORAL
Qty: 180 TABLET | Refills: 2 | Status: SHIPPED | OUTPATIENT
Start: 2020-02-25 | End: 2020-11-05

## 2020-04-06 DIAGNOSIS — I10 ESSENTIAL HYPERTENSION: ICD-10-CM

## 2020-04-06 RX ORDER — METOPROLOL SUCCINATE 50 MG/1
TABLET, EXTENDED RELEASE ORAL
Qty: 90 TABLET | Refills: 1 | Status: SHIPPED | OUTPATIENT
Start: 2020-04-06 | End: 2020-10-02

## 2020-04-18 DIAGNOSIS — E78.2 MIXED HYPERLIPIDEMIA: ICD-10-CM

## 2020-04-20 RX ORDER — ATORVASTATIN CALCIUM 40 MG/1
TABLET, FILM COATED ORAL
Qty: 90 TABLET | Refills: 1 | Status: SHIPPED | OUTPATIENT
Start: 2020-04-20 | End: 2020-04-28

## 2020-04-28 DIAGNOSIS — E78.2 MIXED HYPERLIPIDEMIA: ICD-10-CM

## 2020-04-28 RX ORDER — ATORVASTATIN CALCIUM 40 MG/1
40 TABLET, FILM COATED ORAL DAILY
Qty: 90 TABLET | Refills: 1 | Status: SHIPPED | OUTPATIENT
Start: 2020-04-28 | End: 2020-06-18

## 2020-06-18 DIAGNOSIS — E78.2 MIXED HYPERLIPIDEMIA: Primary | ICD-10-CM

## 2020-06-18 RX ORDER — ROSUVASTATIN CALCIUM 20 MG/1
20 TABLET, COATED ORAL DAILY
COMMUNITY
End: 2020-06-18 | Stop reason: SDUPTHER

## 2020-06-18 RX ORDER — ROSUVASTATIN CALCIUM 20 MG/1
20 TABLET, COATED ORAL DAILY
Qty: 90 TABLET | Refills: 1 | Status: SHIPPED | OUTPATIENT
Start: 2020-06-18 | End: 2020-12-31 | Stop reason: SDUPTHER

## 2020-07-28 DIAGNOSIS — I10 ESSENTIAL HYPERTENSION: ICD-10-CM

## 2020-07-28 RX ORDER — LISINOPRIL AND HYDROCHLOROTHIAZIDE 20; 12.5 MG/1; MG/1
TABLET ORAL
Qty: 90 TABLET | Refills: 1 | Status: SHIPPED | OUTPATIENT
Start: 2020-07-28 | End: 2021-04-27 | Stop reason: SDUPTHER

## 2020-08-13 LAB
ALBUMIN SERPL-MCNC: 4.2 G/DL (ref 3.6–5.1)
ALBUMIN/CREAT UR: 8 MCG/MG CREAT
ALBUMIN/GLOB SERPL: 1.6 (CALC) (ref 1–2.5)
ALP SERPL-CCNC: 49 U/L (ref 37–153)
ALT SERPL-CCNC: 17 U/L (ref 6–29)
AST SERPL-CCNC: 16 U/L (ref 10–35)
BASOPHILS # BLD AUTO: 38 CELLS/UL (ref 0–200)
BASOPHILS NFR BLD AUTO: 0.8 %
BILIRUB SERPL-MCNC: 0.8 MG/DL (ref 0.2–1.2)
BUN SERPL-MCNC: 14 MG/DL (ref 7–25)
BUN/CREAT SERPL: ABNORMAL (CALC) (ref 6–22)
CALCIUM SERPL-MCNC: 9.8 MG/DL (ref 8.6–10.4)
CHLORIDE SERPL-SCNC: 103 MMOL/L (ref 98–110)
CHOLEST SERPL-MCNC: 133 MG/DL
CHOLEST/HDLC SERPL: 3.1 (CALC)
CO2 SERPL-SCNC: 31 MMOL/L (ref 20–32)
CREAT SERPL-MCNC: 0.78 MG/DL (ref 0.6–0.93)
CREAT UR-MCNC: 158 MG/DL (ref 20–275)
EOSINOPHIL # BLD AUTO: 202 CELLS/UL (ref 15–500)
EOSINOPHIL NFR BLD AUTO: 4.2 %
ERYTHROCYTE [DISTWIDTH] IN BLOOD BY AUTOMATED COUNT: 13.1 % (ref 11–15)
GLOBULIN SER CALC-MCNC: 2.7 G/DL (CALC) (ref 1.9–3.7)
GLUCOSE SERPL-MCNC: 107 MG/DL (ref 65–99)
HBA1C MFR BLD: 5.5 % OF TOTAL HGB
HCT VFR BLD AUTO: 40.7 % (ref 35–45)
HDLC SERPL-MCNC: 43 MG/DL
HGB BLD-MCNC: 13.4 G/DL (ref 11.7–15.5)
LDLC SERPL CALC-MCNC: 69 MG/DL (CALC)
LYMPHOCYTES # BLD AUTO: 1315 CELLS/UL (ref 850–3900)
LYMPHOCYTES NFR BLD AUTO: 27.4 %
MCH RBC QN AUTO: 31.9 PG (ref 27–33)
MCHC RBC AUTO-ENTMCNC: 32.9 G/DL (ref 32–36)
MCV RBC AUTO: 96.9 FL (ref 80–100)
MICROALBUMIN UR-MCNC: 1.2 MG/DL
MONOCYTES # BLD AUTO: 389 CELLS/UL (ref 200–950)
MONOCYTES NFR BLD AUTO: 8.1 %
NEUTROPHILS # BLD AUTO: 2856 CELLS/UL (ref 1500–7800)
NEUTROPHILS NFR BLD AUTO: 59.5 %
NONHDLC SERPL-MCNC: 90 MG/DL (CALC)
PLATELET # BLD AUTO: 336 THOUSAND/UL (ref 140–400)
PMV BLD REES-ECKER: 9.1 FL (ref 7.5–12.5)
POTASSIUM SERPL-SCNC: 5 MMOL/L (ref 3.5–5.3)
PROT SERPL-MCNC: 6.9 G/DL (ref 6.1–8.1)
RBC # BLD AUTO: 4.2 MILLION/UL (ref 3.8–5.1)
SL AMB EGFR AFRICAN AMERICAN: 89 ML/MIN/1.73M2
SL AMB EGFR NON AFRICAN AMERICAN: 76 ML/MIN/1.73M2
SODIUM SERPL-SCNC: 138 MMOL/L (ref 135–146)
TRIGL SERPL-MCNC: 129 MG/DL
WBC # BLD AUTO: 4.8 THOUSAND/UL (ref 3.8–10.8)

## 2020-08-17 ENCOUNTER — OFFICE VISIT (OUTPATIENT)
Dept: FAMILY MEDICINE CLINIC | Facility: CLINIC | Age: 70
End: 2020-08-17
Payer: MEDICARE

## 2020-08-17 VITALS
DIASTOLIC BLOOD PRESSURE: 92 MMHG | WEIGHT: 147 LBS | BODY MASS INDEX: 28.86 KG/M2 | RESPIRATION RATE: 16 BRPM | HEART RATE: 96 BPM | SYSTOLIC BLOOD PRESSURE: 142 MMHG | OXYGEN SATURATION: 96 % | HEIGHT: 60 IN | TEMPERATURE: 99.8 F

## 2020-08-17 DIAGNOSIS — R73.01 IFG (IMPAIRED FASTING GLUCOSE): ICD-10-CM

## 2020-08-17 DIAGNOSIS — E78.2 MIXED HYPERLIPIDEMIA: ICD-10-CM

## 2020-08-17 DIAGNOSIS — K21.9 GERD WITHOUT ESOPHAGITIS: ICD-10-CM

## 2020-08-17 DIAGNOSIS — I10 BENIGN ESSENTIAL HYPERTENSION: Primary | ICD-10-CM

## 2020-08-17 DIAGNOSIS — M85.89 OSTEOPENIA OF MULTIPLE SITES: ICD-10-CM

## 2020-08-17 PROCEDURE — 1036F TOBACCO NON-USER: CPT | Performed by: FAMILY MEDICINE

## 2020-08-17 PROCEDURE — 1160F RVW MEDS BY RX/DR IN RCRD: CPT | Performed by: FAMILY MEDICINE

## 2020-08-17 PROCEDURE — 4040F PNEUMOC VAC/ADMIN/RCVD: CPT | Performed by: FAMILY MEDICINE

## 2020-08-17 PROCEDURE — 3008F BODY MASS INDEX DOCD: CPT | Performed by: FAMILY MEDICINE

## 2020-08-17 PROCEDURE — 3080F DIAST BP >= 90 MM HG: CPT | Performed by: FAMILY MEDICINE

## 2020-08-17 PROCEDURE — 3077F SYST BP >= 140 MM HG: CPT | Performed by: FAMILY MEDICINE

## 2020-08-17 PROCEDURE — 99214 OFFICE O/P EST MOD 30 MIN: CPT | Performed by: FAMILY MEDICINE

## 2020-08-17 NOTE — PROGRESS NOTES
FAMILY MEDICINE PROGRESS NOTE  Sheryl Zapata 68 y o  female   DATE: August 17, 2020     ASSESSMENT and PLAN:  Sheryl Zapata is a 68 y o  female with:     Problem List Items Addressed This Visit        Digestive    GERD without esophagitis     Has weaned off of Omeprazole and now doing well with Pepcid 20mg BID, but gets rebound symptoms if she misses evening dose            Endocrine    IFG (impaired fasting glucose)       Lab Results   Component Value Date    HGBA1C 5 5 08/12/2020     Recommend low carb diet         Relevant Orders    Hemoglobin A1C    Comprehensive metabolic panel       Cardiovascular and Mediastinum    Benign essential hypertension - Primary     BP Readings from Last 3 Encounters:   08/17/20 142/92   02/14/20 120/74   10/14/19 108/52     Lab Results   Component Value Date    CREATININE 0 78 08/12/2020     Normally blood pressure is well controlled with lisinopril/hydrochlorothiazide 20/12 5 mg daily and metoprolol succinate 50 mg daily, slightly above goal today, same after repeat after 15 minutes of rest  Will have patient monitor blood pressure at home for the next 2 weeks and call in with blood pressure readings              Musculoskeletal and Integument    Osteopenia of multiple sites     May 2019: LUMBAR SPINE L1-L4 : T-score -2 0  LEFT  TOTAL HIP: T-score -1 5  LEFT  FEMORAL NECK: T score -2 1    Continue Vit D/Calcium supplements  Repeat DEXA May 2021            Other    Mixed hyperlipidemia     Lab Results   Component Value Date    CHOLESTEROL 133 08/12/2020    HDL 43 (L) 08/12/2020    LDLCALC 69 08/12/2020    TRIG 129 08/12/2020     The 10-year ASCVD risk score (Porfirio Walker et al , 2013) is: 21 1%    Values used to calculate the score:      Age: 68 years      Sex: Female      Is Non- : No      Diabetic: No      Tobacco smoker: No      Systolic Blood Pressure: 694 mmHg      Is BP treated: Yes      HDL Cholesterol: 43 mg/dL      Total Cholesterol: 133 mg/dL    Reviewed healthy, low cholesterol diet  Had myalgias with atorvastatin, so switched to rosuvastatin, but is still having mild myalgias of left arm, will do trial off of all statins and once myalgias resolve, can slowly restart rosuvastatin at lower dose           Relevant Orders    Lipid Panel with Direct LDL reflex        Follow-up with update in 2 weeks over the phone, in 6 months for office visit and Medicare annual wellness visit or sooner p r n  SUBJECTIVE:  Renay Dunn is a 68 y o  female who presents today with a chief complaint of Follow-up  Renay Dunn is here for a 6 month follow-up, she did have labs done prior to this visit  Patient has hypertension, hyperlipidemia, impaired fasting glucose, GERD  Her GERD has been better controlled, initially had problems coming off of omeprazole, but now is doing better with Pepcid twice a day  PPI was discontinued due to osteopenia, patient known would like to stay off of PPI at this time  Hyperlipidemia has been well controlled on both atorvastatin and rosuvastatin, recent fasting lipid panel was at goal, the patient had myalgias with atorvastatin that she still states she is having even though she stopped atorvastatin approximately 2 months ago  Patient states she has been eating unhealthy since the pandemic and has been taking a lot more and knows that is why her sugar is high  Her blood pressure is also high today which is new for her, normally her blood pressure is well controlled on lisinopril, hydrochlorothiazide, metoprolol  Patient has a blood pressure cuff at home but does not monitor her blood pressure regularly  Review of Systems   Eyes: Negative for visual disturbance  Respiratory: Negative for shortness of breath  Cardiovascular: Negative for chest pain and palpitations  Musculoskeletal: Negative for arthralgias  Neurological: Negative for dizziness and light-headedness       I have reviewed the patient's Past Medical History  Current Outpatient Medications:     calcium citrate-vitamin D (CITRACAL+D) 315-200 MG-UNIT per tablet, Take 1 tablet by mouth daily, Disp: , Rfl:     famotidine (PEPCID) 20 mg tablet, TAKE 1 TABLET BY MOUTH TWICE A DAY, Disp: 180 tablet, Rfl: 2    fluticasone (FLONASE) 50 mcg/act nasal spray, 1 spray into each nostril Twice daily, Disp: , Rfl:     lisinopril-hydrochlorothiazide (PRINZIDE,ZESTORETIC) 20-12 5 MG per tablet, TAKE 1 TABLET DAILY, Disp: 90 tablet, Rfl: 1    loratadine (CLARITIN) 10 mg tablet, Take 1 tablet by mouth daily as needed, Disp: , Rfl:     metoprolol succinate (TOPROL-XL) 50 mg 24 hr tablet, TAKE 1 TABLET DAILY, Disp: 90 tablet, Rfl: 1    rosuvastatin (CRESTOR) 20 MG tablet, Take 1 tablet (20 mg total) by mouth daily, Disp: 90 tablet, Rfl: 1    OBJECTIVE:  /90   Pulse 96   Temp 99 8 °F (37 7 °C)   Resp 16   Ht 5' (1 524 m)   Wt 66 7 kg (147 lb)   SpO2 96%   BMI 28 71 kg/m²    Physical Exam  Vitals signs reviewed  Constitutional:       General: She is not in acute distress  Appearance: She is obese  She is not ill-appearing, toxic-appearing or diaphoretic  HENT:      Head: Normocephalic and atraumatic  Cardiovascular:      Rate and Rhythm: Normal rate and regular rhythm  Pulses: Normal pulses  Heart sounds: No murmur  Pulmonary:      Effort: Pulmonary effort is normal  No respiratory distress  Breath sounds: Normal breath sounds  Neurological:      Mental Status: She is alert     Psychiatric:         Mood and Affect: Mood normal          Behavior: Behavior normal          Orders Only on 08/12/2020   Component Date Value Ref Range Status    Total Cholesterol 08/12/2020 133  <200 mg/dL Final    HDL 08/12/2020 43* > OR = 50 mg/dL Final    Triglycerides 08/12/2020 129  <150 mg/dL Final    LDL Calculated 08/12/2020 69  mg/dL (calc) Final    Comment: Reference range: <100     Desirable range <100 mg/dL for primary prevention; <70 mg/dL for patients with CHD or diabetic patients   with > or = 2 CHD risk factors  LDL-C is now calculated using the Brant-Flanagan   calculation, which is a validated novel method providing   better accuracy than the Friedewald equation in the   estimation of LDL-C  Malcolm Connell  Priscila Valdes  5592;756(77): 6348-2717   (http://[employer]/faq/GHM854)      Chol HDLC Ratio 08/12/2020 3 1  <5 0 (calc) Final    Non-HDL Cholesterol 08/12/2020 90  <130 mg/dL (calc) Final    Comment: For patients with diabetes plus 1 major ASCVD risk   factor, treating to a non-HDL-C goal of <100 mg/dL   (LDL-C of <70 mg/dL) is considered a therapeutic   option   Creatinine, Urine 08/12/2020 158  20 - 275 mg/dL Final    Microalbum  ,U,Random 08/12/2020 1 2  See Note: mg/dL Final    Comment: Reference Range:  Reference Range  Not established      Microalb/Creat Ratio 08/12/2020 8  <30 mcg/mg creat Final    Comment:    The ADA defines abnormalities in albumin  excretion as follows:     Category         Result (mcg/mg creatinine)     Normal                    <30  Microalbuminuria            Clinical albuminuria   > OR = 300     The ADA recommends that at least two of three  specimens collected within a 3-6 month period be  abnormal before considering a patient to be  within a diagnostic category   Glucose, Random 08/12/2020 107* 65 - 99 mg/dL Final    Comment:               Fasting reference interval     For someone without known diabetes, a glucose value  between 100 and 125 mg/dL is consistent with  prediabetes and should be confirmed with a  follow-up test          BUN 08/12/2020 14  7 - 25 mg/dL Final    Creatinine 08/12/2020 0 78  0 60 - 0 93 mg/dL Final    Comment: For patients >52years of age, the reference limit  for Creatinine is approximately 13% higher for people  identified as -American           eGFR Non African American 08/12/2020 76  > OR = 60 mL/min/1 73m2 Final    eGFR  08/12/2020 89  > OR = 60 mL/min/1 73m2 Final    SL AMB BUN/CREATININE RATIO 89/42/2632 NOT APPLICABLE  6 - 22 (calc) Final    Sodium 08/12/2020 138  135 - 146 mmol/L Final    Potassium 08/12/2020 5 0  3 5 - 5 3 mmol/L Final    Chloride 08/12/2020 103  98 - 110 mmol/L Final    CO2 08/12/2020 31  20 - 32 mmol/L Final    Calcium 08/12/2020 9 8  8 6 - 10 4 mg/dL Final    Protein, Total 08/12/2020 6 9  6 1 - 8 1 g/dL Final    Albumin 08/12/2020 4 2  3 6 - 5 1 g/dL Final    Globulin 08/12/2020 2 7  1 9 - 3 7 g/dL (calc) Final    Albumin/Globulin Ratio 08/12/2020 1 6  1 0 - 2 5 (calc) Final    TOTAL BILIRUBIN 08/12/2020 0 8  0 2 - 1 2 mg/dL Final    Alkaline Phosphatase 08/12/2020 49  37 - 153 U/L Final    AST 08/12/2020 16  10 - 35 U/L Final    ALT 08/12/2020 17  6 - 29 U/L Final    White Blood Cell Count 08/12/2020 4 8  3 8 - 10 8 Thousand/uL Final    Red Blood Cell Count 08/12/2020 4 20  3 80 - 5 10 Million/uL Final    Hemoglobin 08/12/2020 13 4  11 7 - 15 5 g/dL Final    HCT 08/12/2020 40 7  35 0 - 45 0 % Final    MCV 08/12/2020 96 9  80 0 - 100 0 fL Final    MCH 08/12/2020 31 9  27 0 - 33 0 pg Final    MCHC 08/12/2020 32 9  32 0 - 36 0 g/dL Final    RDW 08/12/2020 13 1  11 0 - 15 0 % Final    Platelet Count 21/03/3629 336  140 - 400 Thousand/uL Final    SL AMB MPV 08/12/2020 9 1  7 5 - 12 5 fL Final    Neutrophils (Absolute) 08/12/2020 2,856  1,500 - 7,800 cells/uL Final    Lymphocytes (Absolute) 08/12/2020 1,315  850 - 3,900 cells/uL Final    Monocytes (Absolute) 08/12/2020 389  200 - 950 cells/uL Final    Eosinophils (Absolute) 08/12/2020 202  15 - 500 cells/uL Final    Basophils ABS 08/12/2020 38  0 - 200 cells/uL Final    Neutrophils 08/12/2020 59 5  % Final    Lymphocytes 08/12/2020 27 4  % Final    Monocytes 08/12/2020 8 1  % Final    Eosinophils 08/12/2020 4 2  % Final    Basophils PCT 08/12/2020 0 8  % Final    Hemoglobin A1C 08/12/2020 5 5  <5 7 % of total Hgb Final    Comment: For the purpose of screening for the presence of  diabetes:     <5 7%       Consistent with the absence of diabetes  5 7-6 4%    Consistent with increased risk for diabetes              (prediabetes)  > or =6 5%  Consistent with diabetes     This assay result is consistent with a decreased risk  of diabetes  Currently, no consensus exists regarding use of  hemoglobin A1c for diagnosis of diabetes in children  According to American Diabetes Association (ADA)  guidelines, hemoglobin A1c <7 0% represents optimal  control in non-pregnant diabetic patients  Different  metrics may apply to specific patient populations  Standards of Medical Care in Diabetes(ADA)  BMI Counseling: Body mass index is 28 71 kg/m²  The BMI is above normal  Nutrition recommendations include encouraging healthy choices of fruits and vegetables, moderation in carbohydrate intake and reducing intake of cholesterol  Superior Nanette Vaca MD    Note: Portions of the record have been created with voice recognition software  Occasional wrong word or "sound a like" substitutions may have occurred due to the inherent limitations of voice recognition software  Read the chart carefully and recognize, using context, where substitutions have occurred

## 2020-08-17 NOTE — ASSESSMENT & PLAN NOTE
BP Readings from Last 3 Encounters:   08/17/20 142/92   02/14/20 120/74   10/14/19 108/52     Lab Results   Component Value Date    CREATININE 0 78 08/12/2020     Normally blood pressure is well controlled with lisinopril/hydrochlorothiazide 20/12 5 mg daily and metoprolol succinate 50 mg daily, slightly above goal today, same after repeat after 15 minutes of rest  Will have patient monitor blood pressure at home for the next 2 weeks and call in with blood pressure readings

## 2020-08-17 NOTE — ASSESSMENT & PLAN NOTE
Lab Results   Component Value Date    CHOLESTEROL 133 08/12/2020    HDL 43 (L) 08/12/2020    LDLCALC 69 08/12/2020    TRIG 129 08/12/2020     The 10-year ASCVD risk score (Alicia Broderick et al , 2013) is: 21 1%    Values used to calculate the score:      Age: 68 years      Sex: Female      Is Non- : No      Diabetic: No      Tobacco smoker: No      Systolic Blood Pressure: 746 mmHg      Is BP treated: Yes      HDL Cholesterol: 43 mg/dL      Total Cholesterol: 133 mg/dL    Reviewed healthy, low cholesterol diet  Had myalgias with atorvastatin, so switched to rosuvastatin, but is still having mild myalgias of left arm, will do trial off of all statins and once myalgias resolve, can slowly restart rosuvastatin at lower dose

## 2020-09-23 PROCEDURE — 88305 TISSUE EXAM BY PATHOLOGIST: CPT | Performed by: STUDENT IN AN ORGANIZED HEALTH CARE EDUCATION/TRAINING PROGRAM

## 2020-09-24 ENCOUNTER — LAB REQUISITION (OUTPATIENT)
Dept: LAB | Facility: HOSPITAL | Age: 70
End: 2020-09-24
Payer: MEDICARE

## 2020-09-24 DIAGNOSIS — D48.5 NEOPLASM OF UNCERTAIN BEHAVIOR OF SKIN: ICD-10-CM

## 2020-09-28 ENCOUNTER — HOSPITAL ENCOUNTER (OUTPATIENT)
Dept: RADIOLOGY | Age: 70
Discharge: HOME | End: 2020-09-28
Payer: MEDICARE

## 2020-09-28 VITALS — HEIGHT: 63 IN | WEIGHT: 142 LBS | BODY MASS INDEX: 25.16 KG/M2

## 2020-09-28 DIAGNOSIS — Z12.31 ENCOUNTER FOR SCREENING MAMMOGRAM FOR MALIGNANT NEOPLASM OF BREAST: ICD-10-CM

## 2020-09-28 PROCEDURE — 77063 BREAST TOMOSYNTHESIS BI: CPT

## 2020-09-28 PROCEDURE — 77067 SCR MAMMO BI INCL CAD: CPT

## 2020-10-02 DIAGNOSIS — I10 ESSENTIAL HYPERTENSION: ICD-10-CM

## 2020-10-02 RX ORDER — METOPROLOL SUCCINATE 50 MG/1
TABLET, EXTENDED RELEASE ORAL
Qty: 90 TABLET | Refills: 1 | Status: SHIPPED | OUTPATIENT
Start: 2020-10-02 | End: 2021-03-22 | Stop reason: SDUPTHER

## 2020-10-08 ENCOUNTER — IMMUNIZATIONS (OUTPATIENT)
Dept: FAMILY MEDICINE CLINIC | Facility: CLINIC | Age: 70
End: 2020-10-08
Payer: MEDICARE

## 2020-10-08 DIAGNOSIS — Z23 ENCOUNTER FOR IMMUNIZATION: ICD-10-CM

## 2020-10-08 PROCEDURE — 90662 IIV NO PRSV INCREASED AG IM: CPT

## 2020-10-08 PROCEDURE — G0008 ADMIN INFLUENZA VIRUS VAC: HCPCS

## 2020-10-13 ENCOUNTER — HOSPITAL ENCOUNTER (OUTPATIENT)
Dept: MAMMOGRAPHY | Facility: CLINIC | Age: 70
Discharge: HOME | End: 2020-10-13
Payer: MEDICARE

## 2020-10-13 VITALS — BODY MASS INDEX: 25.16 KG/M2 | WEIGHT: 142 LBS | HEIGHT: 63 IN

## 2020-10-13 DIAGNOSIS — R92.8 ABNORMAL MAMMOGRAM: ICD-10-CM

## 2020-10-13 PROCEDURE — 77065 DX MAMMO INCL CAD UNI: CPT

## 2020-10-14 ENCOUNTER — OFFICE VISIT (OUTPATIENT)
Dept: FAMILY MEDICINE CLINIC | Facility: CLINIC | Age: 70
End: 2020-10-14
Payer: MEDICARE

## 2020-10-14 VITALS
HEIGHT: 61 IN | BODY MASS INDEX: 28.13 KG/M2 | OXYGEN SATURATION: 96 % | WEIGHT: 149 LBS | DIASTOLIC BLOOD PRESSURE: 80 MMHG | SYSTOLIC BLOOD PRESSURE: 128 MMHG | RESPIRATION RATE: 18 BRPM | TEMPERATURE: 97.9 F | HEART RATE: 64 BPM

## 2020-10-14 DIAGNOSIS — R92.8 ABNORMAL MAMMOGRAM OF LEFT BREAST: ICD-10-CM

## 2020-10-14 DIAGNOSIS — E66.3 OVERWEIGHT WITH BODY MASS INDEX (BMI) OF 27 TO 27.9 IN ADULT: ICD-10-CM

## 2020-10-14 DIAGNOSIS — Z00.00 MEDICARE ANNUAL WELLNESS VISIT, SUBSEQUENT: Primary | ICD-10-CM

## 2020-10-14 DIAGNOSIS — K08.89 TOOTH ACHE: ICD-10-CM

## 2020-10-14 PROCEDURE — 99213 OFFICE O/P EST LOW 20 MIN: CPT | Performed by: FAMILY MEDICINE

## 2020-10-14 PROCEDURE — G0438 PPPS, INITIAL VISIT: HCPCS | Performed by: FAMILY MEDICINE

## 2020-10-14 PROCEDURE — 1123F ACP DISCUSS/DSCN MKR DOCD: CPT | Performed by: FAMILY MEDICINE

## 2020-10-23 ENCOUNTER — HOSPITAL ENCOUNTER (OUTPATIENT)
Dept: MAMMOGRAPHY | Facility: CLINIC | Age: 70
Discharge: HOME | End: 2020-10-23
Payer: MEDICARE

## 2020-10-23 VITALS
HEIGHT: 61 IN | HEART RATE: 72 BPM | TEMPERATURE: 98.4 F | DIASTOLIC BLOOD PRESSURE: 80 MMHG | BODY MASS INDEX: 28.13 KG/M2 | SYSTOLIC BLOOD PRESSURE: 144 MMHG | WEIGHT: 149 LBS

## 2020-10-23 DIAGNOSIS — R92.8 ABNORMAL MAMMOGRAM: ICD-10-CM

## 2020-10-23 PROCEDURE — 88305 TISSUE EXAM BY PATHOLOGIST: CPT | Performed by: PATHOLOGY

## 2020-10-23 PROCEDURE — 88342 IMHCHEM/IMCYTCHM 1ST ANTB: CPT | Performed by: PATHOLOGY

## 2020-10-23 PROCEDURE — 19081 BX BREAST 1ST LESION STRTCTC: CPT

## 2020-10-23 PROCEDURE — 88361 TUMOR IMMUNOHISTOCHEM/COMPUT: CPT | Performed by: PATHOLOGY

## 2020-10-23 PROCEDURE — 88341 IMHCHEM/IMCYTCHM EA ADD ANTB: CPT | Performed by: PATHOLOGY

## 2020-10-23 RX ORDER — LIDOCAINE HYDROCHLORIDE AND EPINEPHRINE BITARTRATE 20; .01 MG/ML; MG/ML
10 INJECTION, SOLUTION SUBCUTANEOUS ONCE
Status: COMPLETED | OUTPATIENT
Start: 2020-10-23 | End: 2020-10-23

## 2020-10-23 RX ORDER — LIDOCAINE HYDROCHLORIDE 10 MG/ML
5 INJECTION, SOLUTION EPIDURAL; INFILTRATION; INTRACAUDAL; PERINEURAL ONCE
Status: COMPLETED | OUTPATIENT
Start: 2020-10-23 | End: 2020-10-23

## 2020-10-23 RX ADMIN — LIDOCAINE HYDROCHLORIDE AND EPINEPHRINE 10 ML: 20; 10 INJECTION, SOLUTION INFILTRATION; PERINEURAL at 13:08

## 2020-10-23 RX ADMIN — LIDOCAINE HYDROCHLORIDE 5 ML: 10 INJECTION, SOLUTION EPIDURAL; INFILTRATION; INTRACAUDAL; PERINEURAL at 13:08

## 2020-10-27 ENCOUNTER — TELEPHONE (OUTPATIENT)
Dept: MAMMOGRAPHY | Facility: CLINIC | Age: 70
End: 2020-10-27

## 2020-10-28 ENCOUNTER — TELEPHONE (OUTPATIENT)
Dept: SURGICAL ONCOLOGY | Facility: CLINIC | Age: 70
End: 2020-10-28

## 2020-10-28 ENCOUNTER — TELEPHONE (OUTPATIENT)
Dept: FAMILY MEDICINE CLINIC | Facility: CLINIC | Age: 70
End: 2020-10-28

## 2020-10-28 ENCOUNTER — TELEPHONE (OUTPATIENT)
Dept: MAMMOGRAPHY | Facility: CLINIC | Age: 70
End: 2020-10-28

## 2020-11-05 DIAGNOSIS — K21.9 GERD WITHOUT ESOPHAGITIS: ICD-10-CM

## 2020-11-05 RX ORDER — FAMOTIDINE 20 MG/1
TABLET, FILM COATED ORAL
Qty: 180 TABLET | Refills: 0 | Status: SHIPPED | OUTPATIENT
Start: 2020-11-05 | End: 2021-02-17 | Stop reason: SDUPTHER

## 2020-11-12 ENCOUNTER — TELEPHONE (OUTPATIENT)
Dept: SURGICAL ONCOLOGY | Facility: CLINIC | Age: 70
End: 2020-11-12

## 2020-11-12 PROBLEM — R92.8 ABNORMAL MAMMOGRAM OF LEFT BREAST: Status: RESOLVED | Noted: 2020-10-13 | Resolved: 2020-11-12

## 2020-11-12 PROBLEM — D05.12 DUCTAL CARCINOMA IN SITU (DCIS) OF LEFT BREAST: Status: ACTIVE | Noted: 2020-11-12

## 2020-11-16 ENCOUNTER — CONSULT (OUTPATIENT)
Dept: SURGICAL ONCOLOGY | Facility: CLINIC | Age: 70
End: 2020-11-16
Payer: MEDICARE

## 2020-11-16 ENCOUNTER — PATIENT OUTREACH (OUTPATIENT)
Dept: SURGICAL ONCOLOGY | Facility: CLINIC | Age: 70
End: 2020-11-16

## 2020-11-16 ENCOUNTER — PATIENT OUTREACH (OUTPATIENT)
Dept: CASE MANAGEMENT | Facility: HOSPITAL | Age: 70
End: 2020-11-16

## 2020-11-16 VITALS
RESPIRATION RATE: 16 BRPM | SYSTOLIC BLOOD PRESSURE: 164 MMHG | WEIGHT: 148 LBS | HEIGHT: 61 IN | TEMPERATURE: 97 F | DIASTOLIC BLOOD PRESSURE: 102 MMHG | HEART RATE: 77 BPM | BODY MASS INDEX: 27.94 KG/M2

## 2020-11-16 DIAGNOSIS — Z01.818 PREOPERATIVE TESTING: ICD-10-CM

## 2020-11-16 DIAGNOSIS — D05.12 DUCTAL CARCINOMA IN SITU (DCIS) OF LEFT BREAST: Primary | ICD-10-CM

## 2020-11-16 PROCEDURE — 99205 OFFICE O/P NEW HI 60 MIN: CPT | Performed by: SURGERY

## 2020-11-16 RX ORDER — OXYCODONE HYDROCHLORIDE AND ACETAMINOPHEN 5; 325 MG/1; MG/1
1 TABLET ORAL EVERY 6 HOURS PRN
Qty: 6 TABLET | Refills: 0 | Status: SHIPPED | OUTPATIENT
Start: 2020-11-16 | End: 2021-02-17 | Stop reason: ALTCHOICE

## 2020-11-16 RX ORDER — IBUPROFEN 200 MG
TABLET ORAL EVERY 6 HOURS PRN
COMMUNITY

## 2020-11-19 ENCOUNTER — HOSPITAL ENCOUNTER (OUTPATIENT)
Dept: RADIOLOGY | Facility: HOSPITAL | Age: 70
Discharge: HOME | End: 2020-11-19
Attending: SURGERY
Payer: MEDICARE

## 2020-11-19 ENCOUNTER — LAB (OUTPATIENT)
Dept: LAB | Facility: CLINIC | Age: 70
End: 2020-11-19
Payer: MEDICARE

## 2020-11-19 ENCOUNTER — APPOINTMENT (OUTPATIENT)
Dept: LAB | Facility: CLINIC | Age: 70
End: 2020-11-19
Payer: MEDICARE

## 2020-11-19 DIAGNOSIS — Z01.818 PREOPERATIVE TESTING: ICD-10-CM

## 2020-11-19 DIAGNOSIS — D05.12 DUCTAL CARCINOMA IN SITU (DCIS) OF LEFT BREAST: ICD-10-CM

## 2020-11-19 LAB
ALBUMIN SERPL BCP-MCNC: 3.7 G/DL (ref 3.5–5)
ALP SERPL-CCNC: 57 U/L (ref 46–116)
ALT SERPL W P-5'-P-CCNC: 31 U/L (ref 12–78)
ANION GAP SERPL CALCULATED.3IONS-SCNC: 7 MMOL/L (ref 4–13)
AST SERPL W P-5'-P-CCNC: 19 U/L (ref 5–45)
BASOPHILS # BLD AUTO: 0.04 THOUSANDS/ΜL (ref 0–0.1)
BASOPHILS NFR BLD AUTO: 1 % (ref 0–1)
BILIRUB SERPL-MCNC: 0.7 MG/DL (ref 0.2–1)
BUN SERPL-MCNC: 13 MG/DL (ref 5–25)
CALCIUM SERPL-MCNC: 9.7 MG/DL (ref 8.3–10.1)
CHLORIDE SERPL-SCNC: 103 MMOL/L (ref 100–108)
CO2 SERPL-SCNC: 28 MMOL/L (ref 21–32)
CREAT SERPL-MCNC: 0.93 MG/DL (ref 0.6–1.3)
EOSINOPHIL # BLD AUTO: 0.18 THOUSAND/ΜL (ref 0–0.61)
EOSINOPHIL NFR BLD AUTO: 3 % (ref 0–6)
ERYTHROCYTE [DISTWIDTH] IN BLOOD BY AUTOMATED COUNT: 13 % (ref 11.6–15.1)
GFR SERPL CREATININE-BSD FRML MDRD: 62 ML/MIN/1.73SQ M
GLUCOSE SERPL-MCNC: 108 MG/DL (ref 65–140)
HCT VFR BLD AUTO: 40.2 % (ref 34.8–46.1)
HGB BLD-MCNC: 13.3 G/DL (ref 11.5–15.4)
IMM GRANULOCYTES # BLD AUTO: 0.02 THOUSAND/UL (ref 0–0.2)
IMM GRANULOCYTES NFR BLD AUTO: 0 % (ref 0–2)
LYMPHOCYTES # BLD AUTO: 1.31 THOUSANDS/ΜL (ref 0.6–4.47)
LYMPHOCYTES NFR BLD AUTO: 25 % (ref 14–44)
MCH RBC QN AUTO: 31.5 PG (ref 26.8–34.3)
MCHC RBC AUTO-ENTMCNC: 33.1 G/DL (ref 31.4–37.4)
MCV RBC AUTO: 95 FL (ref 82–98)
MONOCYTES # BLD AUTO: 0.38 THOUSAND/ΜL (ref 0.17–1.22)
MONOCYTES NFR BLD AUTO: 7 % (ref 4–12)
NEUTROPHILS # BLD AUTO: 3.4 THOUSANDS/ΜL (ref 1.85–7.62)
NEUTS SEG NFR BLD AUTO: 64 % (ref 43–75)
NRBC BLD AUTO-RTO: 0 /100 WBCS
PLATELET # BLD AUTO: 358 THOUSANDS/UL (ref 149–390)
PMV BLD AUTO: 8.8 FL (ref 8.9–12.7)
POTASSIUM SERPL-SCNC: 4 MMOL/L (ref 3.5–5.3)
PROT SERPL-MCNC: 7.2 G/DL (ref 6.4–8.2)
RBC # BLD AUTO: 4.22 MILLION/UL (ref 3.81–5.12)
SODIUM SERPL-SCNC: 138 MMOL/L (ref 136–145)
WBC # BLD AUTO: 5.33 THOUSAND/UL (ref 4.31–10.16)

## 2020-11-19 PROCEDURE — 80053 COMPREHEN METABOLIC PANEL: CPT

## 2020-11-19 PROCEDURE — 85025 COMPLETE CBC W/AUTO DIFF WBC: CPT

## 2020-11-19 PROCEDURE — 71046 X-RAY EXAM CHEST 2 VIEWS: CPT

## 2020-11-19 PROCEDURE — 93005 ELECTROCARDIOGRAM TRACING: CPT

## 2020-11-19 PROCEDURE — 93010 ELECTROCARDIOGRAM REPORT: CPT | Performed by: INTERNAL MEDICINE

## 2020-11-19 PROCEDURE — 36415 COLL VENOUS BLD VENIPUNCTURE: CPT

## 2020-11-20 LAB
ATRIAL RATE: 67 BPM
P AXIS: 48 DEGREES
PR INTERVAL: 178 MS
QRS AXIS: -25 DEGREES
QRSD INTERVAL: 72 MS
QT INTERVAL: 392 MS
QTC INTERVAL: 414 MS
T WAVE AXIS: 8 DEGREES
VENTRICULAR RATE: 67 BPM

## 2020-12-03 ENCOUNTER — OFFICE VISIT (OUTPATIENT)
Dept: FAMILY MEDICINE CLINIC | Facility: CLINIC | Age: 70
End: 2020-12-03
Payer: MEDICARE

## 2020-12-03 VITALS
HEIGHT: 62 IN | OXYGEN SATURATION: 98 % | RESPIRATION RATE: 18 BRPM | BODY MASS INDEX: 27.42 KG/M2 | TEMPERATURE: 97.8 F | HEART RATE: 72 BPM | WEIGHT: 149 LBS | SYSTOLIC BLOOD PRESSURE: 118 MMHG | DIASTOLIC BLOOD PRESSURE: 78 MMHG

## 2020-12-03 DIAGNOSIS — Z01.818 PREOPERATIVE TESTING: ICD-10-CM

## 2020-12-03 DIAGNOSIS — D05.12 DUCTAL CARCINOMA IN SITU (DCIS) OF LEFT BREAST: ICD-10-CM

## 2020-12-03 DIAGNOSIS — I10 BENIGN ESSENTIAL HYPERTENSION: Primary | ICD-10-CM

## 2020-12-03 PROCEDURE — 99213 OFFICE O/P EST LOW 20 MIN: CPT | Performed by: FAMILY MEDICINE

## 2020-12-16 ENCOUNTER — TELEPHONE (OUTPATIENT)
Dept: SURGICAL ONCOLOGY | Facility: CLINIC | Age: 70
End: 2020-12-16

## 2020-12-18 ENCOUNTER — OFFICE VISIT (OUTPATIENT)
Dept: SURGICAL ONCOLOGY | Facility: CLINIC | Age: 70
End: 2020-12-18

## 2020-12-18 VITALS
HEIGHT: 62 IN | DIASTOLIC BLOOD PRESSURE: 82 MMHG | WEIGHT: 146.5 LBS | BODY MASS INDEX: 26.96 KG/M2 | SYSTOLIC BLOOD PRESSURE: 160 MMHG | TEMPERATURE: 98.1 F | HEART RATE: 71 BPM

## 2020-12-18 DIAGNOSIS — Z01.818 PREOP EXAMINATION: Primary | ICD-10-CM

## 2020-12-18 DIAGNOSIS — D05.12 DUCTAL CARCINOMA IN SITU (DCIS) OF LEFT BREAST: ICD-10-CM

## 2020-12-18 PROCEDURE — 99024 POSTOP FOLLOW-UP VISIT: CPT | Performed by: SURGERY

## 2020-12-22 ENCOUNTER — ANESTHESIA EVENT (OUTPATIENT)
Dept: PERIOP | Facility: HOSPITAL | Age: 70
End: 2020-12-22
Payer: MEDICARE

## 2020-12-23 ENCOUNTER — APPOINTMENT (OUTPATIENT)
Dept: MAMMOGRAPHY | Facility: HOSPITAL | Age: 70
End: 2020-12-23
Attending: SURGERY
Payer: MEDICARE

## 2020-12-23 ENCOUNTER — HOSPITAL ENCOUNTER (OUTPATIENT)
Dept: MAMMOGRAPHY | Facility: HOSPITAL | Age: 70
Discharge: HOME | End: 2020-12-23
Attending: SURGERY
Payer: MEDICARE

## 2020-12-23 ENCOUNTER — HOSPITAL ENCOUNTER (OUTPATIENT)
Facility: HOSPITAL | Age: 70
Setting detail: OUTPATIENT SURGERY
Discharge: HOME | End: 2020-12-23
Attending: SURGERY | Admitting: SURGERY
Payer: MEDICARE

## 2020-12-23 ENCOUNTER — ANESTHESIA (OUTPATIENT)
Dept: PERIOP | Facility: HOSPITAL | Age: 70
End: 2020-12-23
Payer: MEDICARE

## 2020-12-23 VITALS
BODY MASS INDEX: 27.42 KG/M2 | HEART RATE: 95 BPM | SYSTOLIC BLOOD PRESSURE: 165 MMHG | OXYGEN SATURATION: 97 % | HEIGHT: 62 IN | TEMPERATURE: 97.7 F | RESPIRATION RATE: 19 BRPM | WEIGHT: 149 LBS | DIASTOLIC BLOOD PRESSURE: 81 MMHG

## 2020-12-23 VITALS — HEART RATE: 95 BPM

## 2020-12-23 DIAGNOSIS — D05.12 INTRADUCTAL CARCINOMA IN SITU OF LEFT BREAST: ICD-10-CM

## 2020-12-23 DIAGNOSIS — D05.12 DUCTAL CARCINOMA IN SITU (DCIS) OF LEFT BREAST: ICD-10-CM

## 2020-12-23 PROCEDURE — NC001 PR NO CHARGE: Performed by: PHYSICIAN ASSISTANT

## 2020-12-23 PROCEDURE — 88307 TISSUE EXAM BY PATHOLOGIST: CPT | Performed by: PATHOLOGY

## 2020-12-23 PROCEDURE — 88341 IMHCHEM/IMCYTCHM EA ADD ANTB: CPT | Performed by: PATHOLOGY

## 2020-12-23 PROCEDURE — 19301 PARTIAL MASTECTOMY: CPT | Performed by: SURGERY

## 2020-12-23 PROCEDURE — 88342 IMHCHEM/IMCYTCHM 1ST ANTB: CPT | Performed by: PATHOLOGY

## 2020-12-23 PROCEDURE — G9197 ORDER FOR CEPH: HCPCS | Performed by: SURGERY

## 2020-12-23 PROCEDURE — 19281 PERQ DEVICE BREAST 1ST IMAG: CPT

## 2020-12-23 PROCEDURE — 19301 PARTIAL MASTECTOMY: CPT | Performed by: PHYSICIAN ASSISTANT

## 2020-12-23 RX ORDER — FENTANYL CITRATE 50 UG/ML
INJECTION, SOLUTION INTRAMUSCULAR; INTRAVENOUS AS NEEDED
Status: DISCONTINUED | OUTPATIENT
Start: 2020-12-23 | End: 2020-12-23

## 2020-12-23 RX ORDER — ONDANSETRON 2 MG/ML
4 INJECTION INTRAMUSCULAR; INTRAVENOUS ONCE AS NEEDED
Status: DISCONTINUED | OUTPATIENT
Start: 2020-12-23 | End: 2020-12-23 | Stop reason: HOSPADM

## 2020-12-23 RX ORDER — PROPOFOL 10 MG/ML
INJECTION, EMULSION INTRAVENOUS AS NEEDED
Status: DISCONTINUED | OUTPATIENT
Start: 2020-12-23 | End: 2020-12-23

## 2020-12-23 RX ORDER — MAGNESIUM HYDROXIDE 1200 MG/15ML
LIQUID ORAL AS NEEDED
Status: DISCONTINUED | OUTPATIENT
Start: 2020-12-23 | End: 2020-12-23 | Stop reason: HOSPADM

## 2020-12-23 RX ORDER — MIDAZOLAM HYDROCHLORIDE 2 MG/2ML
INJECTION, SOLUTION INTRAMUSCULAR; INTRAVENOUS AS NEEDED
Status: DISCONTINUED | OUTPATIENT
Start: 2020-12-23 | End: 2020-12-23

## 2020-12-23 RX ORDER — SODIUM CHLORIDE, SODIUM LACTATE, POTASSIUM CHLORIDE, CALCIUM CHLORIDE 600; 310; 30; 20 MG/100ML; MG/100ML; MG/100ML; MG/100ML
125 INJECTION, SOLUTION INTRAVENOUS CONTINUOUS
Status: DISCONTINUED | OUTPATIENT
Start: 2020-12-23 | End: 2020-12-23 | Stop reason: HOSPADM

## 2020-12-23 RX ORDER — LIDOCAINE HYDROCHLORIDE 10 MG/ML
5 INJECTION, SOLUTION EPIDURAL; INFILTRATION; INTRACAUDAL; PERINEURAL ONCE
Status: DISCONTINUED | OUTPATIENT
Start: 2020-12-23 | End: 2020-12-24 | Stop reason: HOSPADM

## 2020-12-23 RX ORDER — LIDOCAINE HYDROCHLORIDE 10 MG/ML
0.5 INJECTION, SOLUTION EPIDURAL; INFILTRATION; INTRACAUDAL; PERINEURAL ONCE AS NEEDED
Status: COMPLETED | OUTPATIENT
Start: 2020-12-23 | End: 2020-12-23

## 2020-12-23 RX ORDER — DEXAMETHASONE SODIUM PHOSPHATE 10 MG/ML
INJECTION, SOLUTION INTRAMUSCULAR; INTRAVENOUS AS NEEDED
Status: DISCONTINUED | OUTPATIENT
Start: 2020-12-23 | End: 2020-12-23

## 2020-12-23 RX ORDER — OXYCODONE HYDROCHLORIDE AND ACETAMINOPHEN 5; 325 MG/1; MG/1
1 TABLET ORAL EVERY 4 HOURS PRN
Status: CANCELLED | OUTPATIENT
Start: 2020-12-23

## 2020-12-23 RX ORDER — CEFAZOLIN SODIUM 1 G/50ML
SOLUTION INTRAVENOUS AS NEEDED
Status: DISCONTINUED | OUTPATIENT
Start: 2020-12-23 | End: 2020-12-23

## 2020-12-23 RX ORDER — FENTANYL CITRATE/PF 50 MCG/ML
25 SYRINGE (ML) INJECTION
Status: DISCONTINUED | OUTPATIENT
Start: 2020-12-23 | End: 2020-12-23 | Stop reason: HOSPADM

## 2020-12-23 RX ORDER — SODIUM CHLORIDE, SODIUM LACTATE, POTASSIUM CHLORIDE, CALCIUM CHLORIDE 600; 310; 30; 20 MG/100ML; MG/100ML; MG/100ML; MG/100ML
20 INJECTION, SOLUTION INTRAVENOUS CONTINUOUS
Status: DISCONTINUED | OUTPATIENT
Start: 2020-12-23 | End: 2020-12-23 | Stop reason: HOSPADM

## 2020-12-23 RX ORDER — HYDROMORPHONE HCL/PF 1 MG/ML
0.2 SYRINGE (ML) INJECTION
Status: DISCONTINUED | OUTPATIENT
Start: 2020-12-23 | End: 2020-12-23 | Stop reason: HOSPADM

## 2020-12-23 RX ORDER — CEFAZOLIN SODIUM 1 G/50ML
1000 SOLUTION INTRAVENOUS ONCE
Status: DISCONTINUED | OUTPATIENT
Start: 2020-12-23 | End: 2020-12-23 | Stop reason: HOSPADM

## 2020-12-23 RX ORDER — BUPIVACAINE HYDROCHLORIDE AND EPINEPHRINE 2.5; 5 MG/ML; UG/ML
INJECTION, SOLUTION INFILTRATION; PERINEURAL AS NEEDED
Status: DISCONTINUED | OUTPATIENT
Start: 2020-12-23 | End: 2020-12-23 | Stop reason: HOSPADM

## 2020-12-23 RX ORDER — ONDANSETRON 2 MG/ML
INJECTION INTRAMUSCULAR; INTRAVENOUS AS NEEDED
Status: DISCONTINUED | OUTPATIENT
Start: 2020-12-23 | End: 2020-12-23

## 2020-12-23 RX ADMIN — PROPOFOL 200 MG: 10 INJECTION, EMULSION INTRAVENOUS at 08:42

## 2020-12-23 RX ADMIN — SODIUM CHLORIDE, SODIUM LACTATE, POTASSIUM CHLORIDE, AND CALCIUM CHLORIDE: .6; .31; .03; .02 INJECTION, SOLUTION INTRAVENOUS at 08:36

## 2020-12-23 RX ADMIN — MIDAZOLAM HYDROCHLORIDE 2 MG: 1 INJECTION, SOLUTION INTRAMUSCULAR; INTRAVENOUS at 08:42

## 2020-12-23 RX ADMIN — LIDOCAINE HYDROCHLORIDE 50 ML: 20 INJECTION, SOLUTION INTRAVENOUS at 08:42

## 2020-12-23 RX ADMIN — SODIUM CHLORIDE, SODIUM LACTATE, POTASSIUM CHLORIDE, AND CALCIUM CHLORIDE: .6; .31; .03; .02 INJECTION, SOLUTION INTRAVENOUS at 09:45

## 2020-12-23 RX ADMIN — PHENYLEPHRINE HYDROCHLORIDE 200 MCG: 10 INJECTION INTRAVENOUS at 09:24

## 2020-12-23 RX ADMIN — FENTANYL CITRATE 100 MCG: 50 INJECTION, SOLUTION INTRAMUSCULAR; INTRAVENOUS at 08:42

## 2020-12-23 RX ADMIN — ONDANSETRON 4 MG: 2 INJECTION INTRAMUSCULAR; INTRAVENOUS at 08:44

## 2020-12-23 RX ADMIN — PHENYLEPHRINE HYDROCHLORIDE 200 MCG: 10 INJECTION INTRAVENOUS at 08:56

## 2020-12-23 RX ADMIN — LIDOCAINE HYDROCHLORIDE 0.5 ML: 10 INJECTION, SOLUTION EPIDURAL; INFILTRATION; INTRACAUDAL at 07:50

## 2020-12-23 RX ADMIN — DEXAMETHASONE SODIUM PHOSPHATE 4 MG: 10 INJECTION, SOLUTION INTRAMUSCULAR; INTRAVENOUS at 08:44

## 2020-12-23 RX ADMIN — PHENYLEPHRINE HYDROCHLORIDE 200 MCG: 10 INJECTION INTRAVENOUS at 09:05

## 2020-12-23 RX ADMIN — CEFAZOLIN SODIUM 1000 MG: 1 SOLUTION INTRAVENOUS at 08:41

## 2020-12-31 DIAGNOSIS — E78.2 MIXED HYPERLIPIDEMIA: ICD-10-CM

## 2020-12-31 RX ORDER — ROSUVASTATIN CALCIUM 20 MG/1
20 TABLET, COATED ORAL DAILY
Qty: 90 TABLET | Refills: 3 | Status: SHIPPED | OUTPATIENT
Start: 2020-12-31 | End: 2021-02-17 | Stop reason: SDUPTHER

## 2021-01-04 ENCOUNTER — OFFICE VISIT (OUTPATIENT)
Dept: SURGICAL ONCOLOGY | Facility: CLINIC | Age: 71
End: 2021-01-04

## 2021-01-04 ENCOUNTER — OFFICE VISIT (OUTPATIENT)
Dept: URGENT CARE | Facility: MEDICAL CENTER | Age: 71
End: 2021-01-04
Payer: MEDICARE

## 2021-01-04 VITALS
HEIGHT: 62 IN | BODY MASS INDEX: 26.68 KG/M2 | RESPIRATION RATE: 14 BRPM | DIASTOLIC BLOOD PRESSURE: 80 MMHG | HEART RATE: 74 BPM | TEMPERATURE: 98.2 F | WEIGHT: 145 LBS | SYSTOLIC BLOOD PRESSURE: 160 MMHG

## 2021-01-04 DIAGNOSIS — D05.12 DUCTAL CARCINOMA IN SITU (DCIS) OF LEFT BREAST: ICD-10-CM

## 2021-01-04 DIAGNOSIS — Z98.890 STATUS POST LEFT BREAST LUMPECTOMY: ICD-10-CM

## 2021-01-04 DIAGNOSIS — Z71.89 OTHER SPECIFIED COUNSELING: Primary | ICD-10-CM

## 2021-01-04 DIAGNOSIS — J01.10 ACUTE FRONTAL SINUSITIS, RECURRENCE NOT SPECIFIED: Primary | ICD-10-CM

## 2021-01-04 PROCEDURE — G0463 HOSPITAL OUTPT CLINIC VISIT: HCPCS | Performed by: PHYSICIAN ASSISTANT

## 2021-01-04 PROCEDURE — 99213 OFFICE O/P EST LOW 20 MIN: CPT | Performed by: PHYSICIAN ASSISTANT

## 2021-01-04 PROCEDURE — 99024 POSTOP FOLLOW-UP VISIT: CPT | Performed by: SURGERY

## 2021-01-04 RX ORDER — DOXYCYCLINE HYCLATE 100 MG/1
100 CAPSULE ORAL EVERY 12 HOURS SCHEDULED
Qty: 14 CAPSULE | Refills: 0 | Status: SHIPPED | OUTPATIENT
Start: 2021-01-04 | End: 2021-01-11

## 2021-01-04 NOTE — PROGRESS NOTES
Surgical Oncology Breast Post-Op       [address]  [facility] [place]  [address] [place] [address]    Alana Burt  1950  [id number]  [address]  CANCER [facility] [place]  [address] [place] [address]    Chief Complaint:   Rick Farah is seen for a post-operative visit of her recent breast surgery  Oncology History:     Oncology History   Ductal carcinoma in situ (DCIS) of left breast   10/23/2020 Biopsy    Left breast stereotactic biopsy  5 o'clock; middle portion  Ductal carcinoma in situ  Grade 2    GA 90    Malignancy appears unifocal  Calcifications cover 1 cm  US left axilla not performed  RIght breast clear  12/23/2020 Surgery    Left breast needle localized lumpectomy  Ductal carcinoma in situ  Grade 2  Posterior margin positive for DCIS   Stage 0           Assessment & Plan:   Assessment/Plan    Patient presents for postoperative visit for her ductal carcinoma in situ  She did have a positive posterior margin however this is on muscle (see operative note)  We have previously talked about the prelude test   I have recommended not doing that and I am referring to her to Radiation Oncology  I am also referring her to Medical Oncology  I will see her back in 3 months  I provided her a copy of her pathology report and reviewed with her  History of Present Illness:   See Oncology History    Interval History:   See Assessment & Plan    Review of Systems:   Review of Systems   All other systems reviewed and are negative        Past Medical History:     Patient Active Problem List   Diagnosis    Basal cell carcinoma of skin    Benign essential hypertension    Mixed hyperlipidemia    GERD without esophagitis    Osteoarthritis of knee    Osteopenia of multiple sites    Overweight with body mass index (BMI) of 27 to 27 9 in adult    IFG (impaired fasting glucose)    Ductal carcinoma in situ (DCIS) of left breast     Past Medical History:   Diagnosis Date    Basal cell carcinoma 2015    Right wrist    Basal cell carcinoma 09/2020    Right eye/cheek    Cancer ([address] )     BCC    Fibrocystic breast disease     GERD (gastroesophageal reflux disease)     Hypercholesterolemia     Hypertension      Past Surgical History:   Procedure Laterality Date    BREAST EXCISIONAL BIOPSY Right     BREAST LUMPECTOMY Left 12/23/2020    Procedure: BREAST NEEDLE LOCALIZED LUMPECTOMY (NEEDLE LOC AT 0700);   Surgeon: Rudi Cantor MD;  [facility];  Service: Surgical Oncology    COLONOSCOPY      MAMMO NEEDLE LOCALIZATION LEFT (ALL INC) Left 12/23/2020    MAMMO STEREOTACTIC BREAST BIOPSY LEFT (ALL INC) Left 10/23/2020     Family History   Problem Relation Age of Onset    Hypertension Mother     Thyroid disease Mother     Diabetes Brother     Kidney failure Brother     No Known Problems Father     No Known Problems Daughter     No Known Problems Maternal Grandmother     No Known Problems Maternal Grandfather     No Known Problems Paternal Grandmother     No Known Problems Paternal Grandfather     No Known Problems Daughter     No Known Problems Maternal Aunt     No Known Problems Maternal Aunt     No Known Problems Maternal Aunt     No Known Problems Maternal Aunt      Social History     Socioeconomic History    Marital status: /Civil Union     Spouse name: Not on file    Number of children: Not on file    Years of education: Not on file    Highest education level: Not on file   Occupational History    Not on file   Social Needs    Financial resource strain: Not on file    Food insecurity     Worry: Not on file     Inability: Not on file   [employer] needs     Medical: Not on file     Non-medical: Not on file   Tobacco Use    Smoking status: Never Smoker    Smokeless tobacco: Never Used   Substance and Sexual Activity    Alcohol use: No    Drug use: No    Sexual activity: Not Currently     Partners: Male     Birth control/protection: None   Lifestyle    Physical activity     Days per week: Not on file     Minutes per session: Not on file    Stress: Not on file   Relationships    Social connections     Talks on phone: Not on file     Gets together: Not on file     Attends Denominational service: Not on file     Active member of club or organization: Not on file     Attends meetings of clubs or organizations: Not on file     Relationship status: Not on file    Intimate partner violence     Fear of current or ex partner: Not on file     Emotionally abused: Not on file     Physically abused: Not on file     Forced sexual activity: Not on file   Other Topics Concern    Not on file   Social History Narrative    Not on file       Current Outpatient Medications:     Acetaminophen (TYLENOL 8 HOUR PO), Take by mouth as needed, Disp: , Rfl:     calcium citrate-vitamin D (CITRACAL+D) 315-200 MG-UNIT per tablet, Take 1 tablet by mouth daily, Disp: , Rfl:     famotidine (PEPCID) 20 mg tablet, TAKE 1 TABLET BY MOUTH TWICE A DAY, Disp: 180 tablet, Rfl: 0    fluticasone (FLONASE) 50 mcg/act nasal spray, 1 spray into each nostril as needed , Disp: , Rfl:     ibuprofen (MOTRIN) 200 mg tablet, Take by mouth every 6 (six) hours as needed for mild pain, Disp: , Rfl:     lisinopril-hydrochlorothiazide (PRINZIDE,ZESTORETIC) 20-12 5 MG per tablet, TAKE 1 TABLET DAILY (Patient taking differently: Take 1 tablet by mouth daily with breakfast ), Disp: 90 tablet, Rfl: 1    loratadine (CLARITIN) 10 mg tablet, Take 1 tablet by mouth daily as needed, Disp: , Rfl:     metoprolol succinate (TOPROL-XL) 50 mg 24 hr tablet, TAKE 1 TABLET DAILY (Patient taking differently: Take 50 mg by mouth daily at bedtime ), Disp: 90 tablet, Rfl: 1    multivitamin-minerals (CENTRUM) tablet, Take 1 tablet by mouth daily, Disp: , Rfl:     rosuvastatin (CRESTOR) 20 MG tablet, Take 1 tablet (20 mg total) by mouth daily, Disp: 90 tablet, Rfl: 3    oxyCODONE-acetaminophen (PERCOCET) 5-325 mg per tablet, Take 1 tablet by mouth every 6 (six) hours as needed for moderate painMax Daily Amount: 4 tablets (Patient not taking: Reported on 12/18/2020), Disp: 6 tablet, Rfl: 0  No Known Allergies    Physical Exams:     Vitals:    01/04/21 1030   BP: 160/80   Pulse: 74   Resp: 14   Temp: 98 2 °F (36 8 °C)     Physical Exam  Chest:      Comments: Examination of the left breast demonstrates a well-healed incision  There is no evidence of infection hematoma seroma  Results & Discussion:   I reviewed the pathology with the patient  I explained she had a positive posterior margin however this was on muscle  We had previously talked about the prelude test   I explained that I would not recommend this test with a positive margin even if it was on muscle  I am referring her to Radiation Oncology for an opinion  I will also refer her to Medical Oncology since she is strongly ER TN positive though I did explained there is no survival benefit for this though it does minimally decrease the chance of local recurrence  I will see her back in 3 months and then we will see her at 6 month intervals with her advanced nurse practitioner  All questions were answered the patient's satisfaction

## 2021-01-05 ENCOUNTER — PATIENT OUTREACH (OUTPATIENT)
Dept: SURGICAL ONCOLOGY | Facility: CLINIC | Age: 71
End: 2021-01-05

## 2021-01-05 NOTE — PROGRESS NOTES
[address] [facility] [employer] [facility]        NAME: Ibis Mathews is a 65 y o  female  : 1950    MRN: [id number]  DATE: 2021  TIME: 7:44 PM    Assessment and Plan   Acute frontal sinusitis, recurrence not specified [J01 10]  1  Acute frontal sinusitis, recurrence not specified  doxycycline hyclate (VIBRAMYCIN) 100 mg capsule         Patient Instructions   Doxycycline as directed  May continue OTC medications  Follow up with PCP in 3-5 days  Proceed to  ER if symptoms worsen  Chief Complaint     Chief Complaint   Patient presents with    Sinusitis     x7 days with sinus congestion and pressure, facial pain    Nasal Congestion     Post nasal drip    Headache         History of Present Illness       Patient is a 26-year-old female who presents today with complaints of headache, facial pain, congestion for the past week  She has been taking over-the-counter medications with little relief  Reports postnasal drip  No sore throat  No fevers or chills  Review of Systems   Review of Systems   Constitutional: Negative for chills and fever  HENT: Positive for congestion, sinus pressure and sinus pain  Negative for sore throat  Respiratory: Negative for cough and shortness of breath  Cardiovascular: Negative for chest pain  Neurological: Positive for headaches           Current Medications       Current Outpatient Medications:     Acetaminophen (TYLENOL 8 HOUR PO), Take by mouth as needed, Disp: , Rfl:     calcium citrate-vitamin D (CITRACAL+D) 315-200 MG-UNIT per tablet, Take 1 tablet by mouth daily, Disp: , Rfl:     doxycycline hyclate (VIBRAMYCIN) 100 mg capsule, Take 1 capsule (100 mg total) by mouth every 12 (twelve) hours for 7 days, Disp: 14 capsule, Rfl: 0    famotidine (PEPCID) 20 mg tablet, TAKE 1 TABLET BY MOUTH TWICE A DAY, Disp: 180 tablet, Rfl: 0    fluticasone (FLONASE) 50 mcg/act nasal spray, 1 spray into each nostril as needed , Disp: , Rfl:     ibuprofen (MOTRIN) 200 mg tablet, Take by mouth every 6 (six) hours as needed for mild pain, Disp: , Rfl:     lisinopril-hydrochlorothiazide (PRINZIDE,ZESTORETIC) 20-12 5 MG per tablet, TAKE 1 TABLET DAILY (Patient taking differently: Take 1 tablet by mouth daily with breakfast ), Disp: 90 tablet, Rfl: 1    loratadine (CLARITIN) 10 mg tablet, Take 1 tablet by mouth daily as needed, Disp: , Rfl:     metoprolol succinate (TOPROL-XL) 50 mg 24 hr tablet, TAKE 1 TABLET DAILY (Patient taking differently: Take 50 mg by mouth daily at bedtime ), Disp: 90 tablet, Rfl: 1    multivitamin-minerals (CENTRUM) tablet, Take 1 tablet by mouth daily, Disp: , Rfl:     oxyCODONE-acetaminophen (PERCOCET) 5-325 mg per tablet, Take 1 tablet by mouth every 6 (six) hours as needed for moderate painMax Daily Amount: 4 tablets (Patient not taking: Reported on 12/18/2020), Disp: 6 tablet, Rfl: 0    rosuvastatin (CRESTOR) 20 MG tablet, Take 1 tablet (20 mg total) by mouth daily, Disp: 90 tablet, Rfl: 3    Current Allergies     Allergies as of 01/04/2021    (No Known Allergies)            The following portions of the patient's history were reviewed and updated as appropriate: allergies, current medications, past family history, past medical history, past social history, past surgical history and problem list      Past Medical History:   Diagnosis Date    Basal cell carcinoma 2015    Right wrist    Basal cell carcinoma 09/2020    Right eye/cheek    Cancer ([address] )     [address]  [employer]    Fibrocystic breast disease     GERD (gastroesophageal reflux disease)     Hypercholesterolemia     Hypertension        Past Surgical History:   Procedure Laterality Date    BREAST EXCISIONAL BIOPSY Right     BREAST LUMPECTOMY Left 12/23/2020    Procedure: BREAST NEEDLE LOCALIZED LUMPECTOMY (NEEDLE LOC AT 0700);   Surgeon: Rama Morris MD;  [facility];  Service: Surgical Oncology    COLONOSCOPY      MAMMO NEEDLE LOCALIZATION LEFT (ALL INC) Left 12/23/2020    MAMMO STEREOTACTIC BREAST BIOPSY LEFT (ALL INC) Left 10/23/2020       Family History   Problem Relation Age of Onset    Hypertension Mother     Thyroid disease Mother     Diabetes Brother     Kidney failure Brother     No Known Problems Father     No Known Problems Daughter     No Known Problems Maternal Grandmother     No Known Problems Maternal Grandfather     No Known Problems Paternal Grandmother     No Known Problems Paternal Grandfather     No Known Problems Daughter     No Known Problems Maternal Aunt     No Known Problems Maternal Aunt     No Known Problems Maternal Aunt     No Known Problems Maternal Aunt          Medications have been verified  Objective   There were no vitals taken for this visit  Physical Exam     Physical Exam  Constitutional:       General: She is not in acute distress  Appearance: Normal appearance  She is normal weight  She is not ill-appearing  HENT:      Head: Normocephalic and atraumatic  Right Ear: Tympanic membrane and ear canal normal       Left Ear: Tympanic membrane and ear canal normal       Nose: Congestion present  Right Sinus: Frontal sinus tenderness present  No maxillary sinus tenderness  Left Sinus: Frontal sinus tenderness present  No maxillary sinus tenderness  Mouth/Throat:      Mouth: Mucous membranes are moist       Pharynx: Oropharynx is clear  No posterior oropharyngeal erythema  Eyes:      Conjunctiva/sclera: Conjunctivae normal       Pupils: Pupils are equal, round, and reactive to light  Cardiovascular:      Rate and Rhythm: Normal rate and regular rhythm  Pulmonary:      Effort: Pulmonary effort is normal       Breath sounds: Normal breath sounds  Skin:     General: Skin is warm and dry  Neurological:      Mental Status: She is alert

## 2021-01-06 NOTE — PROGRESS NOTES
Breast Oncology Nurse Navigator    Patient called with questions regarding information provided to her by Dr Sherry Vaca during recent appointment  She wants to know how large the cancer area by the muscle is that he discussed with her and her  and if it's an aggressive cancer  Informed patient that I am unsure of what exactly was discussed and not able to answer this question but that I will reach out to Carmen Farah, RN surgical oncology with her questions and she will follow up with her  Patient agreeable  Message sent to Carmen Farah with patients question as noted above  Per Carmen Farah she will follow up with patient  Received call from patient 01/06/2021  Patient with additional questions regarding radiation treatment, how long treatment will last, possible side effects, etc that I answered to her satisfaction and further advised that radiation oncology will provide detailed information on exact recommendation and plan of treatment during consult  Patient verbalized understanding and has no additional questions or support needs at this time  Encouraged to continue to call as needed

## 2021-01-14 ENCOUNTER — TELEMEDICINE (OUTPATIENT)
Dept: RADIATION ONCOLOGY | Facility: HOSPITAL | Age: 71
End: 2021-01-14
Attending: RADIOLOGY
Payer: MEDICARE

## 2021-01-14 ENCOUNTER — TELEPHONE (OUTPATIENT)
Dept: RADIATION ONCOLOGY | Facility: HOSPITAL | Age: 71
End: 2021-01-14

## 2021-01-14 DIAGNOSIS — D05.12 DUCTAL CARCINOMA IN SITU (DCIS) OF LEFT BREAST: Primary | ICD-10-CM

## 2021-01-14 DIAGNOSIS — D05.12 DUCTAL CARCINOMA IN SITU (DCIS) OF LEFT BREAST: ICD-10-CM

## 2021-01-14 PROCEDURE — 99211 OFF/OP EST MAY X REQ PHY/QHP: CPT | Performed by: RADIOLOGY

## 2021-01-14 NOTE — PROGRESS NOTES
Virtual Regular Visit Consult    Problem List Items Addressed This Visit        Other    Ductal carcinoma in situ (DCIS) of left breast          Encounter provider Ben Dao MD    Provider located at [place] [id number]      Recent Visits  No visits were found meeting these conditions  Showing recent visits within past 7 days and meeting all other requirements     Today's Visits  Date Type Provider Dept   01/14/21 Trisha Edge MD [facility]   Showing today's visits and meeting all other requirements     Future Appointments  No visits were found meeting these conditions  Showing future appointments within next 150 days and meeting all other requirements        The patient was identified by name and date of birth  Carey Gongora was informed that this is a telemedicine visit and that the visit is being conducted through [employer] and patient was informed that this is a secure, HIPAA-compliant platform  She agrees to proceed     My office door was closed  No one else was in the room  She acknowledged consent and understanding of privacy and security of the video platform  The patient has agreed to participate and understands they can discontinue the visit at any time  Patient is aware this is a billable service  Subjective  Patient presents today via virtual visit to discuss radiation therapy for recently diagnosed left breast cancer, referred by Dr Ace Jacobs  65year old female presented with abnormal screening mammogram in September 2020  Calcifications were noted at the 5 o'clock position approximately 3 cm from the nipple in the left breast  The right breast was unremarkable  She subsequently went for diagnostic imaging and left breast biopsy that revealed ductal carcinoma in situ, grade 2, ER/ME positive disease    She met with Dr Ace Jacobs on 11/15/20 for surgical evaluation, needle localization lumpectomy was recommneded  No known family history of breast cancer  9/28/20 Mammo screening bilateral w 3d & cad  FINDINGS:   LEFT  1) CALCIFICATIONS: There are calcifications in a grouped distribution seen in the lower outer quadrant of the left breast in the middle depth  Stable nodular asymmetric tissue left anterior 6 o'clock position  RIGHT  There are no suspicious masses, grouped microcalcifications or areas of architectural distortion  The skin and nipple areolar complex are unremarkable  Stable circumscribed nodule in the right retroareolar posterior breast known to represent a cyst   No suspicious change  IMPRESSION: Additional imaging required to evaluate a new cluster of microcalcifications in the left outer lower quadrant  10/13/20 Mammo diagnostic left w cad  FINDINGS:   LEFT  1) CALCIFICATIONS: There are fine pleomorphic calcifications in a grouped distribution seen in the lower outer quadrant of the left breast in the middle depth  There is questionable distortion adjacent on standard and magnified views but appears less prominent on tomographic imaging  IMPRESSION:   Recommend stereotactic core biopsy sampling left breast anterior clustered calcification  ASSESSMENT/BI-RADS CATEGORY:  Left: 4 - Suspicious  Overall: 4 - Suspicious     RECOMMENDATION: Stereotactic breast biopsy for the left breast       10/23/20 Left breast 5:00, with and without calcifications, biopsy  - Ductal carcinoma in situ (DCIS), nuclear grade 2, %, MD 90%      12/23/20 Left breast needle localized lumpectomy  Ductal carcinoma in situ (DCIS), solid type, 23 mm, Grade 2  Margins - Posterior margin positive for DCIS   Stage 0    1/4/21 Dr Anabel Santamaria post-operative follow up -   "She did have a positive posterior margin however this is on muscle (see operative note)  "   Refer to radiation oncology and medical oncology         1/27/21 Dr Jyoti Peter consult  4/5/21 Dr Odalys MONTAÑO            Past Medical History:   Diagnosis Date    Basal cell carcinoma 2015    Right wrist    Basal cell carcinoma 09/2020    Right eye/cheek    [address] [address]     Cancer ([facility] [address]     [address]    Fibrocystic breast disease     GERD (gastroesophageal reflux disease)     Hypercholesterolemia     Hypertension        Past Surgical History:   Procedure Laterality Date    BREAST EXCISIONAL BIOPSY Right     BREAST LUMPECTOMY Left 12/23/2020    Procedure: BREAST NEEDLE LOCALIZED LUMPECTOMY (NEEDLE LOC AT 0700);   Surgeon: Tigre Galo MD;  [facility];  Service: Surgical Oncology    COLONOSCOPY      MAMMO NEEDLE LOCALIZATION LEFT (ALL INC) Left 12/23/2020    MAMMO STEREOTACTIC BREAST BIOPSY LEFT (ALL INC) Left 10/23/2020       Current Outpatient Medications   Medication Sig Dispense Refill    Acetaminophen (TYLENOL 8 HOUR PO) Take by mouth as needed      calcium citrate-vitamin D (CITRACAL+D) 315-200 MG-UNIT per tablet Take 1 tablet by mouth daily      famotidine (PEPCID) 20 mg tablet TAKE 1 TABLET BY MOUTH TWICE A  tablet 0    fluticasone (FLONASE) 50 mcg/act nasal spray 1 spray into each nostril as needed       ibuprofen (MOTRIN) 200 mg tablet Take by mouth every 6 (six) hours as needed for mild pain      lisinopril-hydrochlorothiazide (PRINZIDE,ZESTORETIC) 20-12 5 MG per tablet TAKE 1 TABLET DAILY (Patient taking differently: Take 1 tablet by mouth daily with breakfast ) 90 tablet 1    loratadine (CLARITIN) 10 mg tablet Take 1 tablet by mouth daily as needed      metoprolol succinate (TOPROL-XL) 50 mg 24 hr tablet TAKE 1 TABLET DAILY (Patient taking differently: Take 50 mg by mouth daily at bedtime ) 90 tablet 1    multivitamin-minerals (CENTRUM) tablet Take 1 tablet by mouth daily      rosuvastatin (CRESTOR) 20 MG tablet Take 1 tablet (20 mg total) by mouth daily 90 tablet 3    oxyCODONE-acetaminophen (PERCOCET) 5-325 mg per tablet Take 1 tablet by mouth every 6 (six) hours as needed for moderate painMax Daily Amount: 4 tablets (Patient not taking: Reported on 12/18/2020) 6 tablet 0     No current facility-administered medications for this visit  No Known Allergies    Video Exam    There were no vitals filed for this visit  Physical Exam     I spent 30 minutes with the patient during this visit  Oncology History   Ductal carcinoma in situ (DCIS) of left breast   10/23/2020 Biopsy    Left breast stereotactic biopsy  5 o'clock; middle portion  Ductal carcinoma in situ  Grade 2    IN 90    Malignancy appears unifocal  Calcifications cover 1 cm  US left axilla not performed  RIght breast clear       12/23/2020 Surgery    Left breast needle localized lumpectomy  Ductal carcinoma in situ  Grade 2  Posterior margin positive for DCIS   Stage 0             Clinical Trial: no      Health Maintenance   Topic Date Due    DTaP,Tdap,and Td Vaccines (2 - Tdap) 01/01/2021    BMI: Followup Plan  08/17/2021    MAMMOGRAM  10/13/2021    Fall Risk  10/14/2021    Depression Screening PHQ  10/14/2021    Medicare Annual Wellness Visit (AWV)  10/14/2021    BMI: Adult  01/04/2022    Colonoscopy Surveillance  09/27/2024    Colorectal Cancer Screening  09/27/2029    Hepatitis C Screening  Completed    Pneumococcal Vaccine: 65+ Years  Completed    Influenza Vaccine  Completed    HIB Vaccine  Aged Out    Hepatitis B Vaccine  Aged Out    IPV Vaccine  Aged Out    Hepatitis A Vaccine  Aged Out    Meningococcal ACWY Vaccine  Aged Out    HPV Vaccine  Aged Out       Past Medical History:   Diagnosis Date    Basal cell carcinoma 2015    Right wrist    Basal cell carcinoma 09/2020    Right eye/cheek    Breast cancer ([address]     Cancer ([address]     [address]  [place]    Fibrocystic breast disease     GERD (gastroesophageal reflux disease)     Hypercholesterolemia     Hypertension        Past Surgical History:   Procedure Laterality Date    BREAST EXCISIONAL BIOPSY Right     BREAST LUMPECTOMY Left 12/23/2020    Procedure: BREAST NEEDLE LOCALIZED LUMPECTOMY (NEEDLE LOC AT 0700);   Surgeon: Jenae Bliss MD;  [facility];  Service: Surgical Oncology    COLONOSCOPY      MAMMO NEEDLE LOCALIZATION LEFT (ALL INC) Left 12/23/2020    MAMMO STEREOTACTIC BREAST BIOPSY LEFT (ALL INC) Left 10/23/2020       Family History   Problem Relation Age of Onset    Hypertension Mother     Thyroid disease Mother     Diabetes Brother     Kidney failure Brother     No Known Problems Father     No Known Problems Daughter     No Known Problems Maternal Grandmother     No Known Problems Maternal Grandfather     No Known Problems Paternal Grandmother     No Known Problems Paternal Grandfather     No Known Problems Daughter     No Known Problems Maternal Aunt     No Known Problems Maternal Aunt     No Known Problems Maternal Aunt     No Known Problems Maternal Aunt        Social History     Tobacco Use    Smoking status: Never Smoker    Smokeless tobacco: Never Used   Substance Use Topics    Alcohol use: No    Drug use: No          Current Outpatient Medications:     Acetaminophen (TYLENOL 8 HOUR PO), Take by mouth as needed, Disp: , Rfl:     calcium citrate-vitamin D (CITRACAL+D) 315-200 MG-UNIT per tablet, Take 1 tablet by mouth daily, Disp: , Rfl:     famotidine (PEPCID) 20 mg tablet, TAKE 1 TABLET BY MOUTH TWICE A DAY, Disp: 180 tablet, Rfl: 0    fluticasone (FLONASE) 50 mcg/act nasal spray, 1 spray into each nostril as needed , Disp: , Rfl:     ibuprofen (MOTRIN) 200 mg tablet, Take by mouth every 6 (six) hours as needed for mild pain, Disp: , Rfl:     lisinopril-hydrochlorothiazide (PRINZIDE,ZESTORETIC) 20-12 5 MG per tablet, TAKE 1 TABLET DAILY (Patient taking differently: Take 1 tablet by mouth daily with breakfast ), Disp: 90 tablet, Rfl: 1    loratadine (CLARITIN) 10 mg tablet, Take 1 tablet by mouth daily as needed, Disp: , Rfl:     metoprolol succinate (TOPROL-XL) 50 mg 24 hr tablet, TAKE 1 TABLET DAILY (Patient taking differently: Take 50 mg by mouth daily at bedtime ), Disp: 90 tablet, Rfl: 1    multivitamin-minerals (CENTRUM) tablet, Take 1 tablet by mouth daily, Disp: , Rfl:     rosuvastatin (CRESTOR) 20 MG tablet, Take 1 tablet (20 mg total) by mouth daily, Disp: 90 tablet, Rfl: 3    oxyCODONE-acetaminophen (PERCOCET) 5-325 mg per tablet, Take 1 tablet by mouth every 6 (six) hours as needed for moderate painMax Daily Amount: 4 tablets (Patient not taking: Reported on 2020), Disp: 6 tablet, Rfl: 0    No Known Allergies     Review of Systems:  Review of Systems   Constitutional: Positive for fatigue (mild )  HENT: Positive for sinus pressure  Eyes: Negative  Respiratory: Negative  Cardiovascular: Negative  Gastrointestinal: Negative  Endocrine: Negative  Genitourinary: Negative  Musculoskeletal: Positive for arthralgias (mild joint aches, arthritis)  Skin:        Healing left breast lumpectomy site   Allergic/Immunologic: Negative  Neurological: Positive for headaches  Hematological: Negative  Psychiatric/Behavioral: Negative  There were no vitals filed for this visit  OB/GYN History:  The patient underwent menarche at 13 years  Menopause Status Pre, Ángela, Post, Unknown and No Answer  No LMP recorded  Patient is postmenopausal   Menopause at 46 years  Menopause Reason natural   Hormone replacement therapy: no      3   Para 3   Age at first delivery being 19 years  Nursing: not applicable  Birth control pills: no     Pregnancy test needed:  no    PFT: n/a    Imaging:No images are attached to the encounter       Teaching: NCI packet mailed to patient    MST completed     Implantable Devices (Port, Pacemaker, pain stimulator): no    Hip Replacement: no

## 2021-01-14 NOTE — PROGRESS NOTES
Consultation - Radiation Oncology      [facility]:[id number] : 1950  Encounter: [id number]  Patient Information: Esha Muhammad      CHIEF COMPLAINT  Chief Complaint   Patient presents with   Kenyetta Splinter Consult     Radiation Oncology Virtual Consult     Cancer Staging  No matching staging information was found for the patient  The patient was identified by name and date of birth  Esha Muhammad was informed that this is a telemedicine visit and that the visit is being conducted through [employer] and patient was informed that this is a secure, HIPAA-compliant platform  She agrees to proceed     My office door was closed  No one else was in the room  She acknowledged consent and understanding of privacy and security of the video platform  The patient has agreed to participate and understands they can discontinue the visit at any time      Patient is aware this is a billable service  History of Present Illness       Patient presents today via virtual visit to discuss radiation therapy for recently diagnosed left breast cancer, referred by Dr Jaimie Benitez      65year old female presented with abnormal screening mammogram in 2020  Calcifications were noted at the 5 o'clock position approximately 3 cm from the nipple in the left breast  The right breast was unremarkable  She subsequently went for diagnostic imaging and left breast biopsy that revealed ductal carcinoma in situ, grade 2, ER/MS positive disease    She met with Dr Jaimie Benitez on 11/15/20 for surgical evaluation, needle localization lumpectomy was recommneded       No known family history of breast cancer      20 Mammo screening bilateral w 3d & cad  FINDINGS:   LEFT  1) CALCIFICATIONS: There are calcifications in a grouped distribution seen in the lower outer quadrant of the left breast in the middle depth       Stable nodular asymmetric tissue left anterior 6 o'clock position      RIGHT  There are no suspicious masses, grouped microcalcifications or areas of architectural distortion  The skin and nipple areolar complex are unremarkable        Stable circumscribed nodule in the right retroareolar posterior breast known to represent a cyst   No suspicious change       IMPRESSION: Additional imaging required to evaluate a new cluster of microcalcifications in the left outer lower quadrant      10/13/20 Mammo diagnostic left w cad  FINDINGS:   LEFT  1) CALCIFICATIONS: There are fine pleomorphic calcifications in a grouped distribution seen in the lower outer quadrant of the left breast in the middle depth    There is questionable distortion adjacent on standard and magnified views but appears less prominent on tomographic imaging       IMPRESSION:   Recommend stereotactic core biopsy sampling left breast anterior clustered calcification      ASSESSMENT/BI-RADS CATEGORY:  Left: 4 - Suspicious  Overall: 4 - Suspicious     RECOMMENDATION: Stereotactic breast biopsy for the left breast         10/23/20 Left breast 5:00, with and without calcifications, biopsy  - Ductal carcinoma in situ (DCIS), nuclear grade 2, %, UT 90%        12/23/20 Left breast needle localized lumpectomy  Ductal carcinoma in situ (DCIS), solid type, 23 mm, Grade 2  Margins - Posterior margin positive for DCIS   Stage 0     1/4/21 Dr Sasha Almaguer post-operative follow up -   "She did have a positive posterior margin however this is on muscle (see operative note)  "   Refer to radiation oncology and medical oncology         1/27/21 Dr Hightower Rim consult  4/5/21 Dr Oneill Liner   Oncology History   Ductal carcinoma in situ (DCIS) of left breast   10/23/2020 Biopsy    Left breast stereotactic biopsy  5 o'clock; middle portion  Ductal carcinoma in situ  Grade 2    UT 90    Malignancy appears unifocal  Calcifications cover 1 cm  US left axilla not performed  RIght breast clear       12/23/2020 Surgery    Left breast needle localized lumpectomy  Ductal carcinoma in situ  Grade 2  Posterior margin positive for DCIS   Stage 0             Past Medical History:   Diagnosis Date    Basal cell carcinoma 2015    Right wrist    Basal cell carcinoma 09/2020    Right eye/cheek    [facility] [address] [facility]     Cancer ([facility] [address] [facility]     [address]  [place] [employer]    Fibrocystic breast disease     GERD (gastroesophageal reflux disease)     Hypercholesterolemia     Hypertension      Past Surgical History:   Procedure Laterality Date    BREAST EXCISIONAL BIOPSY Right     BREAST LUMPECTOMY Left 12/23/2020    Procedure: BREAST NEEDLE LOCALIZED LUMPECTOMY (NEEDLE LOC AT 0700);   Surgeon: Lily Dwyer MD;  [facility];  Service: Surgical Oncology    COLONOSCOPY      MAMMO NEEDLE LOCALIZATION LEFT ([employer]) Left 12/23/2020    MAMMO STEREOTACTIC BREAST BIOPSY LEFT ([employer]) Left 10/23/2020       Family History   Problem Relation Age of Onset    Hypertension Mother     Thyroid disease Mother     Diabetes Brother     Kidney failure Brother     No Known Problems Father     No Known Problems Daughter     No Known Problems Maternal Grandmother     No Known Problems Maternal Grandfather     No Known Problems Paternal Grandmother     No Known Problems Paternal Grandfather     No Known Problems Daughter     No Known Problems Maternal Aunt     No Known Problems Maternal Aunt     No Known Problems Maternal Aunt     No Known Problems Maternal Aunt        Social History   Social History     Substance and Sexual Activity   Alcohol Use No     Social History     Substance and Sexual Activity   Drug Use No     Social History     Tobacco Use   Smoking Status Never Smoker   Smokeless Tobacco Never Used         Meds/Allergies     Current Outpatient Medications:     Acetaminophen (TYLENOL 8 HOUR PO), Take by mouth as needed, Disp: , Rfl:     calcium citrate-vitamin D (CITRACAL+D) 315-200 MG-UNIT per tablet, Take 1 tablet by mouth daily, Disp: , Rfl:     famotidine (PEPCID) 20 mg tablet, TAKE 1 TABLET BY MOUTH TWICE A DAY, Disp: 180 tablet, Rfl: 0    fluticasone (FLONASE) 50 mcg/act nasal spray, 1 spray into each nostril as needed , Disp: , Rfl:     ibuprofen (MOTRIN) 200 mg tablet, Take by mouth every 6 (six) hours as needed for mild pain, Disp: , Rfl:     lisinopril-hydrochlorothiazide (PRINZIDE,ZESTORETIC) 20-12 5 MG per tablet, TAKE 1 TABLET DAILY (Patient taking differently: Take 1 tablet by mouth daily with breakfast ), Disp: 90 tablet, Rfl: 1    loratadine (CLARITIN) 10 mg tablet, Take 1 tablet by mouth daily as needed, Disp: , Rfl:     metoprolol succinate (TOPROL-XL) 50 mg 24 hr tablet, TAKE 1 TABLET DAILY (Patient taking differently: Take 50 mg by mouth daily at bedtime ), Disp: 90 tablet, Rfl: 1    multivitamin-minerals (CENTRUM) tablet, Take 1 tablet by mouth daily, Disp: , Rfl:     rosuvastatin (CRESTOR) 20 MG tablet, Take 1 tablet (20 mg total) by mouth daily, Disp: 90 tablet, Rfl: 3    oxyCODONE-acetaminophen (PERCOCET) 5-325 mg per tablet, Take 1 tablet by mouth every 6 (six) hours as needed for moderate painMax Daily Amount: 4 tablets (Patient not taking: Reported on 12/18/2020), Disp: 6 tablet, Rfl: 0  No Known Allergies      Review of Systems   Constitutional: Positive for fatigue (mild )  HENT: Positive for sinus pressure  Eyes: Negative  Respiratory: Negative  Cardiovascular: Negative  Gastrointestinal: Negative  Endocrine: Negative  Genitourinary: Negative  Musculoskeletal: Positive for arthralgias (mild joint aches, arthritis)  Skin:        Healing left breast lumpectomy site   Allergic/Immunologic: Negative  Neurological: Positive for headaches  Hematological: Negative  Psychiatric/Behavioral: Negative        OB/GYN History:  The patient underwent menarche at 13 years  Menopause Status Pre, Ángela, Post, Unknown and No Answer  No LMP recorded  Patient is postmenopausal   Menopause at 46 years    Menopause Reason natural   Hormone replacement therapy: no   3   Para 3   Age at first delivery being 19 years  Nursing: not applicable  Birth control pills: no     Pregnancy test needed:  no  OBJECTIVE:   There were no vitals taken for this visit  Pain Assessment:  0  Performance Status: ECOG/Zubrod/WHO: 0 - Asymptomatic    Physical Exam     The patient is conversing appropriately  Her breathing is unlabored  She is unable to show her breast incision  Due to the location where she is conducting her virtual visit today  RESULTS  Lab Results    Chemistry        Component Value Date/Time     2017 1004    K 4 0 2020 1150    K 5 0 2020 0946     2020 1150     2020 0946    CO2 28 2020 1150    CO2 31 2020 0946    BUN 13 2020 1150    BUN 14 2020 0946    CREATININE 0 93 2020 1150    CREATININE 0 90 2017 1004        Component Value Date/Time    CALCIUM 9 7 2020 1150    CALCIUM 9 8 2020 0946    ALKPHOS 57 2020 1150    ALKPHOS 49 2020 0946    AST 19 2020 1150    AST 16 2020 0946    ALT 31 2020 1150    ALT 17 2020 0946    BILITOT 0 6 2017 1004            Lab Results   Component Value Date    WBC 5 33 2020    HGB 13 3 2020    HCT 40 2 2020    MCV 95 2020     2020         Imaging Studies  Mammo Needle Localization Left ([facility] [employer])    Result Date: 2020  Narrative: MAMMOGRAPHY NEEDLE LOCALIZATION Indication: Left breast ductal carcinoma in situ  Localize stereotactic clip preoperatively Comparison: Prior imaging studies of the left breast Procedure: Informed consent was obtained prior to the procedure, discussing possible complications such as bleeding, infection, or allergic reaction   After verifying patient and side of interest and initialing side of concern (time out procedure), and utilizing digital mammographic guidance and sterile technique, a needle localization procedure of the stereotactic clip in the central portion of the breast  was performed from a superior approach  The localizing  wire was positioned adjacent to the targeted stereotactic clip  A radiopaque skin marker was placed at the wire entry site  The patient tolerated the procedure well, leaving the department  in satisfactory condition, with  guidance images available on PACS  Impression: Impression: Successful needle localization procedure of the targeted stereotactic clip  Workstation performed: [id number]     Mammo Breast Specimen Left (no Charge)    Result Date: 12/23/2020  Narrative: MAMMOGRAPHY BREAST SPECIMEN INDICATION: Evaluation of left breast surgical specimen TECHNIQUE: 2 digital images submitted; images marked with respect to specimen margins  FINDINGS: The specimen includes the localizing wire  The stereotactic clip  in question is included in the specimen  Impression:  The breast specimen includes the localizing wire and targeted stereotactic clip    Workstation performed: [id number]         Pathology:  Procedure  Excision (less than total mastectomy)    Specimen Laterality  Left    TUMOR   Tumor Site  Lower outer quadrant      Clock position      5 o'clock    Tumor Site  distance from nipple not given    Histologic Type  Ductal carcinoma in situ    Size (Extent) of DCIS  Estimated size (extent) of DCIS is at least NABIL COM HSPTL): 23 mm   Architectural Patterns  Solid    Nuclear Grade  Grade II (intermediate)    Necrosis  Present, focal (small foci or single cell necrosis)    Microcalcifications  Present in DCIS    MARGINS   Margins  Positive for DCIS    Positive Margin(s)  Posterior      Focal    LYMPH NODES   Regional Lymph Nodes  No lymph nodes submitted or found    PATHOLOGIC STAGE CLASSIFICATION (pTNM, AJCC 8th Edition)      Primary Tumor (pT)  pTis (DCIS)    Regional Lymph Nodes (pN)  pNX    ADDITIONAL FINDINGS   Additional Findings  intraductal micropapillomata, radial scar, proliferative fibrocystic changes    Breast Biomarker Testing Performed on Previous Biopsy     Estrogen Receptor (ER) Status  Positive    Percentage of Cells with Nuclear Positivity  100 %   Breast Biomarker Testing Performed on Previous Biopsy     Progesterone Receptor (PgR) Status  Positive    Percentage of Cells with Nuclear Positivity  90-95 %   Testing Performed on Case Number  [id number]              [id number]  0  Ductal carcinoma in situ (DCIS) of left breast  Ambulatory referral to Radiation Oncology     Cancer Staging  No matching staging information was found for the patient  PLAN/DISCUSSION  No orders of the defined types were placed in this encounter  Juan Del Rosario is a 65y o  year old female with   Left breast DCIS TIS NX grade 2  Her tumor is ERPR positive  She status post breast conservation surgery  She has a positive posterior margin at the chest wall  I reviewed with her a course of adjuvant radiation therapy  We discussed this will reduce her risk of local recurrence by at least 50 percent  The plan will be to treat the left breast to dose of 5000 centigray followed by boost should the area with positive margin be detectable on imaging for an additional 1600 centigray  We discussed the time of simulation she will undergo both free breathing as well as breath hold imaging for optimal cardiac sparing  Possible side effects both acute and long-term reviewed with her  This can include but not limited to fatigue, skin erythema, desquamation, breast fibrosis, pneumonitis,  Cardiac injury  She is agreeable to the above outlined plan  She meets with Medical Oncology at the end of the month  She currently has 2 family members within her home however active COVID  We discussed that she still has time for initiating her radiation treatment    Follow-up appointment for simulation will be scheduled   In mid February to allow  medical oncology visit  As well as her family members recovering from RICARDO Patrick MD  1/14/2021,11:37 AM      Portions of the record may have been created with voice recognition software  Occasional wrong word or "sound a like" substitutions may have occurred due to the inherent limitations of voice recognition software  Read the chart carefully and recognize, using context, where substitutions have occurred

## 2021-01-14 NOTE — TELEPHONE ENCOUNTER
I scheduled pt for a Breast Sim on Thursday 2/11/21  Her appt card and Info packet was put in the mail today

## 2021-01-15 ENCOUNTER — PATIENT OUTREACH (OUTPATIENT)
Dept: SURGICAL ONCOLOGY | Facility: CLINIC | Age: 71
End: 2021-01-15

## 2021-01-15 NOTE — PROGRESS NOTES
Patient called with questions regarding her final pathology that I answered to her satisfaction  She is also asking if its is too soon to start radiation treatment and asking for recommended timing after surgery  Informed that radiation to started no sooner than 4 weeks after surgery and that she is scheduled accordingly  Explained upcoming SIM process and planning RT once complete to return about 1 week later to start treatment  Patient with no additional questions at this time

## 2021-01-22 ENCOUNTER — TELEPHONE (OUTPATIENT)
Dept: FAMILY MEDICINE CLINIC | Facility: CLINIC | Age: 71
End: 2021-01-22

## 2021-01-22 NOTE — TELEPHONE ENCOUNTER
Patient states she does not need a refill on her lisinopril-hctz  Will call office when she does need a refill

## 2021-01-26 ENCOUNTER — TELEPHONE (OUTPATIENT)
Dept: HEMATOLOGY ONCOLOGY | Facility: CLINIC | Age: 71
End: 2021-01-26

## 2021-01-27 NOTE — TELEPHONE ENCOUNTER
Called patient and got her rescheduled for 2/16 at 9:00am with dr avina here at the [facility] [place] location

## 2021-01-29 ENCOUNTER — PATIENT OUTREACH (OUTPATIENT)
Dept: SURGICAL ONCOLOGY | Facility: CLINIC | Age: 71
End: 2021-01-29

## 2021-01-30 DIAGNOSIS — K21.9 GERD WITHOUT ESOPHAGITIS: ICD-10-CM

## 2021-02-01 NOTE — PROGRESS NOTES
Received call from patient asking about Covid vaccine timing as she has appointments coming up with radiation and medical oncology  She shared that her dad just passed away from Covid and she is fearful and plans to get Covid vaccine when it is available  Discussed [facility] Covid vaccine availability for her age group at this time and timing in relation to radiation or medical treatment  Patient verbalized understanding and with no additional questions at this time  Encouraged to call as needed

## 2021-02-02 RX ORDER — FAMOTIDINE 20 MG/1
TABLET, FILM COATED ORAL
Qty: 180 TABLET | Refills: 0 | OUTPATIENT
Start: 2021-02-02

## 2021-02-08 ENCOUNTER — TELEPHONE (OUTPATIENT)
Dept: FAMILY MEDICINE CLINIC | Facility: CLINIC | Age: 71
End: 2021-02-08

## 2021-02-08 ENCOUNTER — OFFICE VISIT (OUTPATIENT)
Dept: URGENT CARE | Facility: MEDICAL CENTER | Age: 71
End: 2021-02-08
Payer: MEDICARE

## 2021-02-08 VITALS
SYSTOLIC BLOOD PRESSURE: 174 MMHG | HEIGHT: 62 IN | RESPIRATION RATE: 18 BRPM | DIASTOLIC BLOOD PRESSURE: 98 MMHG | HEART RATE: 86 BPM | TEMPERATURE: 98.4 F | WEIGHT: 145.4 LBS | OXYGEN SATURATION: 97 % | BODY MASS INDEX: 26.76 KG/M2

## 2021-02-08 DIAGNOSIS — T80.90XA INJECTION SITE REACTION, INITIAL ENCOUNTER: Primary | ICD-10-CM

## 2021-02-08 PROCEDURE — 99213 OFFICE O/P EST LOW 20 MIN: CPT | Performed by: PHYSICIAN ASSISTANT

## 2021-02-08 PROCEDURE — G0463 HOSPITAL OUTPT CLINIC VISIT: HCPCS | Performed by: PHYSICIAN ASSISTANT

## 2021-02-08 NOTE — TELEPHONE ENCOUNTER
Patient got her Covid vaccine on 1/29/21  She is going to have her pharmacy fax record of vaccine for her chart  Today she broke out with a raised, itched red rash around her vaccine site on left arm  She has no fever or other symptoms  Should she schedule for appt or try something OTC? Please advise

## 2021-02-08 NOTE — TELEPHONE ENCOUNTER
Did she have a band-aid or tape over vaccine site? it is far more likely to be a delayed-type sensitivity to adhesive than the vaccine   She can take OTC antihistamine, try steroid cream like hydrocortisone cream

## 2021-02-09 NOTE — PROGRESS NOTES
[facility]        NAME: Miguelina Vera is a 65 y o  female  : 1950    MRN: [id number]  DATE: 2021  TIME: 8:39 PM    Assessment and Plan   Injection site reaction, initial encounter Qi Ospina  1  Injection site reaction, initial encounter       Discussed with patient this has been a common issue with moderna vaccination 7-10 days post injection with delayed reaction  Recommended symptomatic treatment with cold compresses, hydrocortisone, antihistamines  Patient Instructions   Use cool compresses on area  May continue hydrocortisone  May use antihistamines  Follow up with PCP in 3-5 days  Proceed to  ER if symptoms worsen  Chief Complaint     Chief Complaint   Patient presents with    Allergic Reaction     Allergic reaction to the Mederna vaccine given on 21 left arm x 24 hours  History of Present Illness       Patient is a 77-year-old female who presents today with complaints of left upper arm rash that started yesterday  She states it is mostly itchy  She did receive the moderna COVID-19 vaccination on 2021, this was her 1st dose  Has been putting hydrocortisone on the area which does help  No pain  No fevers  Review of Systems   Review of Systems   Constitutional: Negative for fever  Respiratory: Negative for shortness of breath  Cardiovascular: Negative for chest pain  Skin: Positive for rash           Current Medications       Current Outpatient Medications:     calcium citrate-vitamin D (CITRACAL+D) 315-200 MG-UNIT per tablet, Take 1 tablet by mouth daily, Disp: , Rfl:     famotidine (PEPCID) 20 mg tablet, TAKE 1 TABLET BY MOUTH TWICE A DAY, Disp: 180 tablet, Rfl: 0    ibuprofen (MOTRIN) 200 mg tablet, Take by mouth every 6 (six) hours as needed for mild pain, Disp: , Rfl:     lisinopril-hydrochlorothiazide (PRINZIDE,ZESTORETIC) 20-12 5 MG per tablet, TAKE 1 TABLET DAILY (Patient taking differently: Take 1 tablet by mouth daily with breakfast ), Disp: 90 tablet, Rfl: 1    loratadine (CLARITIN) 10 mg tablet, Take 1 tablet by mouth daily as needed, Disp: , Rfl:     metoprolol succinate (TOPROL-XL) 50 mg 24 hr tablet, TAKE 1 TABLET DAILY (Patient taking differently: Take 50 mg by mouth daily at bedtime ), Disp: 90 tablet, Rfl: 1    multivitamin-minerals (CENTRUM) tablet, Take 1 tablet by mouth daily, Disp: , Rfl:     rosuvastatin (CRESTOR) 20 MG tablet, Take 1 tablet (20 mg total) by mouth daily, Disp: 90 tablet, Rfl: 3    Acetaminophen (TYLENOL 8 HOUR PO), Take by mouth as needed, Disp: , Rfl:     fluticasone (FLONASE) 50 mcg/act nasal spray, 1 spray into each nostril as needed , Disp: , Rfl:     oxyCODONE-acetaminophen (PERCOCET) 5-325 mg per tablet, Take 1 tablet by mouth every 6 (six) hours as needed for moderate painMax Daily Amount: 4 tablets (Patient not taking: Reported on 12/18/2020), Disp: 6 tablet, Rfl: 0    Current Allergies     Allergies as of 02/08/2021    (No Known Allergies)            The following portions of the patient's history were reviewed and updated as appropriate: allergies, current medications, past family history, past medical history, past social history, past surgical history and problem list      Past Medical History:   Diagnosis Date    Basal cell carcinoma 2015    Right wrist    Basal cell carcinoma 09/2020    Right eye/cheek    [facility] [address]     Cancer ([facility] [address] [facility]     [address]  [place]    Fibrocystic breast disease     GERD (gastroesophageal reflux disease)     Hypercholesterolemia     Hypertension        Past Surgical History:   Procedure Laterality Date    BREAST EXCISIONAL BIOPSY Right     BREAST LUMPECTOMY Left 12/23/2020    Procedure: BREAST NEEDLE LOCALIZED LUMPECTOMY (NEEDLE LOC AT 0700);   Surgeon: Agus Shrestha MD;  [facility];  Service: Surgical Oncology    COLONOSCOPY      MAMMO NEEDLE LOCALIZATION LEFT (ALL INC) Left 12/23/2020    MAMMO STEREOTACTIC BREAST BIOPSY LEFT (ALL INC) Left 10/23/2020       Family History   Problem Relation Age of Onset    Hypertension Mother     Thyroid disease Mother     Diabetes Brother     Kidney failure Brother     No Known Problems Father     No Known Problems Daughter     No Known Problems Maternal Grandmother     No Known Problems Maternal Grandfather     No Known Problems Paternal Grandmother     No Known Problems Paternal Grandfather     No Known Problems Daughter     No Known Problems Maternal Aunt     No Known Problems Maternal Aunt     No Known Problems Maternal Aunt     No Known Problems Maternal Aunt          Medications have been verified  Objective   BP (!) 174/98   Pulse 86   Temp 98 4 °F (36 9 °C) (Temporal)   Resp 18   Ht 5' 2" (1 575 m)   Wt 66 kg (145 lb 6 4 oz)   SpO2 97%   BMI 26 59 kg/m²        Physical Exam     Physical Exam  Constitutional:       General: She is not in acute distress  Appearance: Normal appearance  She is not ill-appearing  Cardiovascular:      Rate and Rhythm: Normal rate and regular rhythm  Skin:     General: Skin is warm and dry  Findings: Rash present  Neurological:      Mental Status: She is alert

## 2021-02-16 ENCOUNTER — TELEMEDICINE (OUTPATIENT)
Dept: HEMATOLOGY ONCOLOGY | Facility: CLINIC | Age: 71
End: 2021-02-16
Payer: MEDICARE

## 2021-02-16 ENCOUNTER — TELEPHONE (OUTPATIENT)
Dept: HEMATOLOGY ONCOLOGY | Facility: CLINIC | Age: 71
End: 2021-02-16

## 2021-02-16 DIAGNOSIS — D05.12 DUCTAL CARCINOMA IN SITU (DCIS) OF LEFT BREAST: ICD-10-CM

## 2021-02-16 PROCEDURE — 99204 OFFICE O/P NEW MOD 45 MIN: CPT | Performed by: INTERNAL MEDICINE

## 2021-02-16 NOTE — PROGRESS NOTES
Virtual Regular Visit      Assessment/Plan:    Problem List Items Addressed This Visit        Other    Ductal carcinoma in situ (DCIS) of left breast               Reason for visit is   Chief Complaint   Patient presents with    Follow-up    Virtual Regular Visit        Encounter provider Zach Luna MD    Provider located at [address] [id number]      Recent Visits  No visits were found meeting these conditions  Showing recent visits within past 7 days and meeting all other requirements     Today's Visits  Date Type Provider Dept   02/16/21 Telemedicine Zach Luna MD [facility]   Showing today's visits and meeting all other requirements     Future Appointments  No visits were found meeting these conditions  Showing future appointments within next 150 days and meeting all other requirements        The patient was identified by name and date of birth  Nathalie Cramer was informed that this is a telemedicine visit and that the visit is being conducted through [facility] and patient was informed that this is a secure, HIPAA-compliant platform  She agrees to proceed     My office door was closed  No one else was in the room  She acknowledged consent and understanding of privacy and security of the video platform  The patient has agreed to participate and understands they can discontinue the visit at any time  Patient is aware this is a billable service  Assessment / Plan:    A 68-year-old postmenopausal woman with 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, grade 2, % positive, [place]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  She underwent lumpectomy, resulting in ROSA  She presents today to discuss possible adjuvant therapy  We had extensive discussion regarding the diagnosis, non invasive nature of disease, non life-threatening disease, very good prognosis and treatment options  Since she has ER positive DCIS, adjuvant hormonal therapy with aromatase inhibitor may be considered  However, adjuvant hormonal therapy for DCIS does not provide survival benefit  Benefit in terms of local recurrence is small  Side effects of AI was thoroughly discussed, including but not limited to hot flashes, musculoskeletal symptom and bone mineral density loss  Risk benefit ratio of adjuvant hormonal therapy is not favorable  Therefore, I do not strongly recommend adjuvant hormonal therapy for her  She is in agreement with my recommendation  She is going to be followed clinically by Dr Mayra Collazo  Subjective:     HPI:    A 58-year-old postmenopausal woman who was found to have abnormality in her left breast based on a screening mammography  She underwent left breast biopsy in October 23, 2020 which showed ductal carcinoma in situ, % positive, [place]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  She subsequent underwent lumpectomy by Dr Mayra Collazo in December 23, 2020 which showed ductal carcinoma in situ, grade 2  There was no evidence of invasive carcinoma  She presents today with virtual meeting to discuss adjuvant treatment options  She has minor past medical history with hypertension  Otherwise, she is very healthy  She has no surgical history  She has no family history of breast cancer  She feels well with no breast related symptoms  She denied any pain  Her weight is stable  She has no respiratory symptoms  Her performance status is normal   She is a lifetime never smoker  Interval History:          Objective:     Primary Diagnosis:      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, grade 2, % positive, [place]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  Diagnosed in December 2020  Cancer Staging:  [unfilled]      Previous Hematologic/ Oncologic Treatment:         Current Hematologic/ Oncologic Treatment:       observation  Disease Status:       ROSA status post lumpectomy      Test Results:    Pathology:     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, grade 2, % positive, [place]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  No evidence of invasive carcinoma  Radiology:      Chest x-ray was negative for pulmonary disease  Laboratory:      See below  Physical Exam:      General: Patient appears well-developed  Patient is adequately nourished  Patient is not diaphoretic  Patient is not in distress  Neck: Visualization of the neck demonstrates no grossly visible masses  Neck mobility is not compromised, neck appears supple  HEENT: Oral mucosa appears moist  Patient does not identify palpable neck masses  Patient reports no oral tenderness or readily identifiable masses  Eyes: Conjunctivae appear normal bilaterally  Right eye with no discharge  Left eye with no discharge  No evidence of scleral icterus  No evidence of strabismus  Respiratory: Respiratory effort appears normal  There is no respiratory distress  Patient able to speak in full sentences  There was no audible stridor or cough  Abdomen: Patient states her abdomen is soft  States abdomen is non-tender  States abdomen is non-distended  Patient denies visible or palpable bulges to suggest hernias  Musculoskeletal: Patient reports and I can confirm no visible deformities in 4 extremities  Patient reports and I can confirm full mobility in 4 extremities  There is no grossly visible limb edema  There is no evidence of clubbing or peripheral cyanosis  Neurologic: Patient is fully alert and responsive  Patient is oriented to time, place and person  Gross evaluation of CNs III-IV-VI-VII-VIII and XI demonstrates no deficits  Patient reports normal gait and balance  Skin: My evaluation of exposed skin areas reveals no evidence of pallor  My evaluation of exposed skin areas reveals no obvious rashes  My evaluation of exposed skin areas reveals no grossly visible lesions  My evaluation of exposed skin areas reveals no evidence of erythema    Psychiatric / Behavioral: Patient's mood and affect appears normal  Patient's judgement is preserved  Patient is coherent and thought content appears directionally and contextually appropriate for age and health status  Past Medical History:   Diagnosis Date    Basal cell carcinoma 2015    Right wrist    Basal cell carcinoma 09/2020    Right eye/cheek    [facility]     Cancer [facility]     BCC    Fibrocystic breast disease     GERD (gastroesophageal reflux disease)     Hypercholesterolemia     Hypertension        Past Surgical History:   Procedure Laterality Date    BREAST EXCISIONAL BIOPSY Right     BREAST LUMPECTOMY Left 12/23/2020    Procedure: BREAST NEEDLE LOCALIZED LUMPECTOMY (NEEDLE LOC AT 0700);   Surgeon: Kathi Nagel MD;  [facility];  Service: Surgical Oncology    COLONOSCOPY      MAMMO NEEDLE LOCALIZATION LEFT (ALL INC) Left 12/23/2020    MAMMO STEREOTACTIC BREAST BIOPSY LEFT (ALL INC) Left 10/23/2020       Current Outpatient Medications   Medication Sig Dispense Refill    Acetaminophen (TYLENOL 8 HOUR PO) Take by mouth as needed      calcium citrate-vitamin D (CITRACAL+D) 315-200 MG-UNIT per tablet Take 1 tablet by mouth daily      famotidine (PEPCID) 20 mg tablet TAKE 1 TABLET BY MOUTH TWICE A  tablet 0    fluticasone (FLONASE) 50 mcg/act nasal spray 1 spray into each nostril as needed       ibuprofen (MOTRIN) 200 mg tablet Take by mouth every 6 (six) hours as needed for mild pain      lisinopril-hydrochlorothiazide (PRINZIDE,ZESTORETIC) 20-12 5 MG per tablet TAKE 1 TABLET DAILY (Patient taking differently: Take 1 tablet by mouth daily with breakfast ) 90 tablet 1    loratadine (CLARITIN) 10 mg tablet Take 1 tablet by mouth daily as needed      metoprolol succinate (TOPROL-XL) 50 mg 24 hr tablet TAKE 1 TABLET DAILY (Patient taking differently: Take 50 mg by mouth daily at bedtime ) 90 tablet 1    multivitamin-minerals (CENTRUM) tablet Take 1 tablet by mouth daily      oxyCODONE-acetaminophen (PERCOCET) 5-325 mg per tablet Take 1 tablet by mouth every 6 (six) hours as needed for moderate painMax Daily Amount: 4 tablets 6 tablet 0    rosuvastatin (CRESTOR) 20 MG tablet Take 1 tablet (20 mg total) by mouth daily 90 tablet 3     No current facility-administered medications for this visit  No Known Allergies    Review of Systems    Video Exam    Vitals:       Physical Exam     I spent 25 minutes directly with the patient during this visit      VIRTUAL VISIT Peggy 83 acknowledges that she has consented to an online visit or consultation  She understands that the online visit is based solely on information provided by her, and that, in the absence of a face-to-face physical evaluation by the physician, the diagnosis she receives is both limited and provisional in terms of accuracy and completeness  This is not intended to replace a full medical face-to-face evaluation by the physician  Pete Keita understands and accepts these terms

## 2021-02-16 NOTE — PROGRESS NOTES
FAMILY MEDICINE PROGRESS NOTE    Date of Service: 21  Primary Care Provider:   Jarrod High MD       Name: Ibis Mathews       : 1950       Age:70 y o  Sex: female      MRN: [id number]      Chief Complaint:Follow-up (6 months)       ASSESSMENT and PLAN:  Ibis Mathews is a 65 y o  female with:     Problem List Items Addressed This Visit        Digestive    GERD without esophagitis     Controlled, continue Pepcid         Relevant Medications    famotidine (PEPCID) 20 mg tablet       Endocrine    IFG (impaired fasting glucose)     Lab Results   Component Value Date    HGBA1C 5 5 2020    HGBA1C 5 8 (H) 2014    HGBA1C 5 8 (H) 2013     Lab Results   Component Value Date    LDLCALC 69 2020    CREATININE 0 93 2020     Most recent fasting glucose is normal  Counseled on healthy eating, regular physical activity  Relevant Orders    Comprehensive metabolic panel       Cardiovascular and Mediastinum    Benign essential hypertension - Primary     BP Readings from Last 3 Encounters:   21 140/84   21 (!) 174/98   21 160/80     Lab Results   Component Value Date    CREATININE 0 93 2020    EGFR 62 2020     Currently on lisinopril-hydrochlorothiazide 20-12 5 mg daily and metoprolol succinate 50 mg daily and controlled today  Continue current regimen            Relevant Orders    Comprehensive metabolic panel       Musculoskeletal and Integument    Osteopenia of multiple sites     DEXA scan in May 2019 T-score -2 0 in Lumbar spine, T-score -1 5 in left hip, T score -2 1 left femoral neck  Continue Vitamin D, Calcium supplements  Repeat DEXA after May 2021            Other    Mixed hyperlipidemia     Lab Results   Component Value Date    CHOLESTEROL 133 2020    HDL 43 (L) 2020    LDLCALC 69 2020    TRIG 129 2020     The 10-year ASCVD risk score (Geovanny Mckeon et al , 2013) is: 17 3%    Values used to calculate the score:      Age: 65 years      Sex: Female      Is Non- : No      Diabetic: No      Tobacco smoker: No      Systolic Blood Pressure: 724 mmHg      Is BP treated: Yes      HDL Cholesterol: 43 mg/dL      Total Cholesterol: 133 mg/dL    Reviewed healthy, low cholesterol diet  She is on rosuvastatin 20 mg, no side effects           Relevant Medications    rosuvastatin (CRESTOR) 20 MG tablet    Ductal carcinoma in situ (DCIS) of left breast     S/p lumpectomy, will start radiation soon  SUBJECTIVE:  Nathalie Cramer is a 65 y o  female who presents today with a chief complaint of Follow-up (6 months)  HPI     Patient presents for follow-up today  She recently underwent lumpectomy  Pathology ductal carcinoma in situ, % positive, [place]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  She did see heme/onc to discuss hormonal therapy, which was not started, as it was not felt to provide benefits outweighing risks  Her father recently passed with COVID last month at the age of 80  Patient did get her vaccine and developed a local reaction with a rash on her arm the week after her shot  Hypertension  She is on lisinopril-HCTZ 20-12 5 mg daily and metoprolol succinate 50 mg nightly  She reports normal blood pressure readings at home  She denies symptoms of chest pain, shortness of breath, headaches, vision changes  Review of Systems   HENT: Positive for sinus pressure  Eyes: Negative for visual disturbance  Respiratory: Negative for shortness of breath  Cardiovascular: Negative for chest pain, palpitations and leg swelling  Neurological: Negative for dizziness, light-headedness and headaches  I have reviewed the patient's Past Medical History      Current Outpatient Medications:     Acetaminophen (TYLENOL 8 HOUR PO), Take by mouth as needed, Disp: , Rfl:     calcium citrate-vitamin D (CITRACAL+D) 315-200 MG-UNIT per tablet, Take 1 tablet by mouth daily, Disp: , Rfl:     famotidine (PEPCID) 20 mg tablet, Take 1 tablet (20 mg total) by mouth 2 (two) times a day, Disp: 180 tablet, Rfl: 1    fluticasone (FLONASE) 50 mcg/act nasal spray, 1 spray into each nostril as needed , Disp: , Rfl:     ibuprofen (MOTRIN) 200 mg tablet, Take by mouth every 6 (six) hours as needed for mild pain, Disp: , Rfl:     lisinopril-hydrochlorothiazide (PRINZIDE,ZESTORETIC) 20-12 5 MG per tablet, TAKE 1 TABLET DAILY (Patient taking differently: Take 1 tablet by mouth daily with breakfast ), Disp: 90 tablet, Rfl: 1    loratadine (CLARITIN) 10 mg tablet, Take 1 tablet by mouth daily as needed, Disp: , Rfl:     metoprolol succinate (TOPROL-XL) 50 mg 24 hr tablet, TAKE 1 TABLET DAILY (Patient taking differently: Take 50 mg by mouth daily at bedtime ), Disp: 90 tablet, Rfl: 1    multivitamin-minerals (CENTRUM) tablet, Take 1 tablet by mouth daily, Disp: , Rfl:     rosuvastatin (CRESTOR) 20 MG tablet, Take 1 tablet (20 mg total) by mouth daily, Disp: 90 tablet, Rfl: 3    OBJECTIVE:  /84   Pulse 74   Temp 97 6 °F (36 4 °C)   Resp 14   Ht 5' 2" (1 575 m)   Wt 67 4 kg (148 lb 8 oz)   BMI 27 16 kg/m²    BP Readings from Last 3 Encounters:   02/17/21 140/84   02/08/21 (!) 174/98   01/04/21 160/80      Wt Readings from Last 3 Encounters:   02/17/21 67 4 kg (148 lb 8 oz)   02/08/21 66 kg (145 lb 6 4 oz)   01/04/21 65 8 kg (145 lb)      Physical Exam  Constitutional:       General: She is not in acute distress  Appearance: Normal appearance  She is normal weight  She is not ill-appearing or toxic-appearing  HENT:      Head: Normocephalic and atraumatic  Right Ear: External ear normal       Left Ear: External ear normal    Eyes:      Extraocular Movements: Extraocular movements intact  Conjunctiva/sclera: Conjunctivae normal    Neck:      Musculoskeletal: Normal range of motion and neck supple  Cardiovascular:      Rate and Rhythm: Normal rate and regular rhythm  Pulses: Normal pulses        Heart sounds: Normal heart sounds  Pulmonary:      Effort: Pulmonary effort is normal  No respiratory distress  Breath sounds: Normal breath sounds  No wheezing or rales  Abdominal:      General: There is no distension  Palpations: Abdomen is soft  Musculoskeletal: Normal range of motion  Right lower leg: No edema  Left lower leg: No edema  Skin:     General: Skin is warm and dry  Findings: No erythema or rash  Neurological:      General: No focal deficit present  Mental Status: She is alert and oriented to person, place, and time  Psychiatric:         Mood and Affect: Mood normal          Behavior: Behavior normal          Lab Results   Component Value Date    WBC 5 33 11/19/2020    HGB 13 3 11/19/2020    HCT 40 2 11/19/2020    MCV 95 11/19/2020     11/19/2020     Lab Results   Component Value Date     05/24/2017    SODIUM 138 11/19/2020    K 4 0 11/19/2020     11/19/2020    CO2 28 11/19/2020    AGAP 7 11/19/2020    BUN 13 11/19/2020    CREATININE 0 93 11/19/2020    GLUC 108 11/19/2020    CALCIUM 9 7 11/19/2020    AST 19 11/19/2020    ALT 31 11/19/2020    ALKPHOS 57 11/19/2020    PROT 6 5 05/24/2017    TP 7 2 11/19/2020    BILITOT 0 6 05/24/2017    TBILI 0 70 11/19/2020    EGFR 62 11/19/2020     Lab Results   Component Value Date    HGBA1C 5 5 08/12/2020     Lab Results   Component Value Date    CHOLESTEROL 133 08/12/2020    CHOLESTEROL 109 07/31/2019    CHOLESTEROL 204 (H) 02/05/2019     Lab Results   Component Value Date    HDL 43 (L) 08/12/2020    HDL 40 (L) 07/31/2019    HDL 38 (L) 02/05/2019     Lab Results   Component Value Date    TRIG 129 08/12/2020    TRIG 98 07/31/2019    TRIG 167 (H) 02/05/2019     Lab Results   Component Value Date    NONHDLC 157 05/24/2017    [place] 188 (H) 07/27/2016    [place] 133 12/11/2012     Lab Results   Component Value Date    LDLCALC 69 08/12/2020              Return for AWV due in October      Mirtha Hannah MD    Note: Portions of the record have been created with voice recognition software  Occasional wrong word or "sound a like" substitutions may have occurred due to the inherent limitations of voice recognition software  Read the chart carefully and recognize, using context, where substitutions have occurred

## 2021-02-16 NOTE — LETTER
February 16, 2021     Taniya Goff MD  [address] [place] [address]    Patient: Nakul Lewis   YOB: 1950   Date of Visit: 2/16/2021       Dear Dr Golda Soulier: Thank you for referring Amador Rose to me for evaluation  Below are my notes for this consultation  If you have questions, please do not hesitate to call me  I look forward to following your patient along with you  Sincerely,        Dorcas Colmenares MD        CC: MD Dorcas Moser MD  2/16/2021  9:24 AM  Sign when Signing Visit    Virtual Regular Visit      Assessment/Plan:    Problem List Items Addressed This Visit        Other    Ductal carcinoma in situ (DCIS) of left breast               Reason for visit is   Chief Complaint   Patient presents with    Follow-up    Virtual Regular Visit        Encounter provider Dorcas Colmenares MD    Provider located at [address] [place] [id number]      Recent Visits  No visits were found meeting these conditions  Showing recent visits within past 7 days and meeting all other requirements     Today's Visits  Date Type Provider Dept   02/16/21 Telemedicine Dorcas Colmenares MD [facility]   Showing today's visits and meeting all other requirements     Future Appointments  No visits were found meeting these conditions  Showing future appointments within next 150 days and meeting all other requirements        The patient was identified by name and date of birth  Nakul Lewis was informed that this is a telemedicine visit and that the visit is being conducted through [employer] and patient was informed that this is a secure, HIPAA-compliant platform  She agrees to proceed     My office door was closed  No one else was in the room  She acknowledged consent and understanding of privacy and security of the video platform   The patient has agreed to participate and understands they can discontinue the visit at any time  Patient is aware this is a billable service  Assessment / Plan:    A 75-year-old postmenopausal woman with 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, grade 2, % positive, MS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  She underwent lumpectomy, resulting in ROSA  She presents today to discuss possible adjuvant therapy  We had extensive discussion regarding the diagnosis, non invasive nature of disease, non life-threatening disease, very good prognosis and treatment options  Since she has ER positive DCIS, adjuvant hormonal therapy with aromatase inhibitor may be considered  However, adjuvant hormonal therapy for DCIS does not provide survival benefit  Benefit in terms of local recurrence is small  Side effects of AI was thoroughly discussed, including but not limited to hot flashes, musculoskeletal symptom and bone mineral density loss  Risk benefit ratio of adjuvant hormonal therapy is not favorable  Therefore, I do not strongly recommend adjuvant hormonal therapy for her  She is in agreement with my recommendation  She is going to be followed clinically by Dr Jo Rodarte  Subjective:     HPI:    A 75-year-old postmenopausal woman who was found to have abnormality in her left breast based on a screening mammography  She underwent left breast biopsy in October 23, 2020 which showed ductal carcinoma in situ, % positive, MS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  She subsequent underwent lumpectomy by Dr Jo Rodarte in December 23, 2020 which showed ductal carcinoma in situ, grade 2  There was no evidence of invasive carcinoma  She presents today with virtual meeting to discuss adjuvant treatment options  She has minor past medical history with hypertension  Otherwise, she is very healthy  She has no surgical history  She has no family history of breast cancer  She feels well with no breast related symptoms  She denied any pain  Her weight is stable    She has no respiratory symptoms  Her performance status is normal   She is a lifetime never smoker  Interval History:          Objective:     Primary Diagnosis:      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, grade 2, % positive, AL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  Diagnosed in December 2020  Cancer Staging:  [unfilled]      Previous Hematologic/ Oncologic Treatment:         Current Hematologic/ Oncologic Treatment:       observation  Disease Status:       ROSA status post lumpectomy  Test Results:    Pathology:     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, grade 2, % positive, AL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  No evidence of invasive carcinoma  Radiology:      Chest x-ray was negative for pulmonary disease  Laboratory:      See below  Physical Exam:      General: Patient appears well-developed  Patient is adequately nourished  Patient is not diaphoretic  Patient is not in distress  Neck: Visualization of the neck demonstrates no grossly visible masses  Neck mobility is not compromised, neck appears supple  HEENT: Oral mucosa appears moist  Patient does not identify palpable neck masses  Patient reports no oral tenderness or readily identifiable masses  Eyes: Conjunctivae appear normal bilaterally  Right eye with no discharge  Left eye with no discharge  No evidence of scleral icterus  No evidence of strabismus  Respiratory: Respiratory effort appears normal  There is no respiratory distress  Patient able to speak in full sentences  There was no audible stridor or cough  Abdomen: Patient states her abdomen is soft  States abdomen is non-tender  States abdomen is non-distended  Patient denies visible or palpable bulges to suggest hernias  Musculoskeletal: Patient reports and I can confirm no visible deformities in 4 extremities  Patient reports and I can confirm full mobility in 4 extremities  There is no grossly visible limb edema  There is no evidence of clubbing or peripheral cyanosis    Neurologic: Patient is fully alert and responsive  Patient is oriented to time, place and person  Gross evaluation of CNs III-IV-VI-VII-VIII and XI demonstrates no deficits  Patient reports normal gait and balance  Skin: My evaluation of exposed skin areas reveals no evidence of pallor  My evaluation of exposed skin areas reveals no obvious rashes  My evaluation of exposed skin areas reveals no grossly visible lesions  My evaluation of exposed skin areas reveals no evidence of erythema  Psychiatric / Behavioral: Patient's mood and affect appears normal  Patient's judgement is preserved  Patient is coherent and thought content appears directionally and contextually appropriate for age and health status  Past Medical History:   Diagnosis Date    Basal cell carcinoma 2015    Right wrist    Basal cell carcinoma 09/2020    Right eye/cheek    [facility] [facility]     Cancer ([facility] [address] [facility]     [facility]    Fibrocystic breast disease     GERD (gastroesophageal reflux disease)     Hypercholesterolemia     Hypertension        Past Surgical History:   Procedure Laterality Date    BREAST EXCISIONAL BIOPSY Right     BREAST LUMPECTOMY Left 12/23/2020    Procedure: BREAST NEEDLE LOCALIZED LUMPECTOMY (NEEDLE LOC AT 0700);   Surgeon: Erlin Peng MD;  [facility];  Service: Surgical Oncology    COLONOSCOPY      MAMMO NEEDLE LOCALIZATION LEFT (ALL INC) Left 12/23/2020    MAMMO STEREOTACTIC BREAST BIOPSY LEFT (ALL INC) Left 10/23/2020       Current Outpatient Medications   Medication Sig Dispense Refill    Acetaminophen (TYLENOL 8 HOUR PO) Take by mouth as needed      calcium citrate-vitamin D (CITRACAL+D) 315-200 MG-UNIT per tablet Take 1 tablet by mouth daily      famotidine (PEPCID) 20 mg tablet TAKE 1 TABLET BY MOUTH TWICE A  tablet 0    fluticasone (FLONASE) 50 mcg/act nasal spray 1 spray into each nostril as needed       ibuprofen (MOTRIN) 200 mg tablet Take by mouth every 6 (six) hours as needed for mild pain      lisinopril-hydrochlorothiazide (PRINZIDE,ZESTORETIC) 20-12 5 MG per tablet TAKE 1 TABLET DAILY (Patient taking differently: Take 1 tablet by mouth daily with breakfast ) 90 tablet 1    loratadine (CLARITIN) 10 mg tablet Take 1 tablet by mouth daily as needed      metoprolol succinate (TOPROL-XL) 50 mg 24 hr tablet TAKE 1 TABLET DAILY (Patient taking differently: Take 50 mg by mouth daily at bedtime ) 90 tablet 1    multivitamin-minerals (CENTRUM) tablet Take 1 tablet by mouth daily      oxyCODONE-acetaminophen (PERCOCET) 5-325 mg per tablet Take 1 tablet by mouth every 6 (six) hours as needed for moderate painMax Daily Amount: 4 tablets 6 tablet 0    rosuvastatin (CRESTOR) 20 MG tablet Take 1 tablet (20 mg total) by mouth daily 90 tablet 3     No current facility-administered medications for this visit  No Known Allergies    Review of Systems    Video Exam    Vitals:       Physical Exam     I spent 25 minutes directly with the patient during this visit      VIRTUAL VISIT Peggy Ramachandran acknowledges that she has consented to an online visit or consultation  She understands that the online visit is based solely on information provided by her, and that, in the absence of a face-to-face physical evaluation by the physician, the diagnosis she receives is both limited and provisional in terms of accuracy and completeness  This is not intended to replace a full medical face-to-face evaluation by the physician  Glenn Rosas understands and accepts these terms

## 2021-02-17 ENCOUNTER — OFFICE VISIT (OUTPATIENT)
Dept: FAMILY MEDICINE CLINIC | Facility: CLINIC | Age: 71
End: 2021-02-17
Payer: MEDICARE

## 2021-02-17 VITALS
SYSTOLIC BLOOD PRESSURE: 140 MMHG | DIASTOLIC BLOOD PRESSURE: 84 MMHG | HEIGHT: 62 IN | BODY MASS INDEX: 27.33 KG/M2 | RESPIRATION RATE: 14 BRPM | TEMPERATURE: 97.6 F | WEIGHT: 148.5 LBS | HEART RATE: 74 BPM

## 2021-02-17 DIAGNOSIS — R73.01 IFG (IMPAIRED FASTING GLUCOSE): ICD-10-CM

## 2021-02-17 DIAGNOSIS — M85.89 OSTEOPENIA OF MULTIPLE SITES: ICD-10-CM

## 2021-02-17 DIAGNOSIS — E78.2 MIXED HYPERLIPIDEMIA: ICD-10-CM

## 2021-02-17 DIAGNOSIS — D05.12 DUCTAL CARCINOMA IN SITU (DCIS) OF LEFT BREAST: ICD-10-CM

## 2021-02-17 DIAGNOSIS — K21.9 GERD WITHOUT ESOPHAGITIS: ICD-10-CM

## 2021-02-17 DIAGNOSIS — I10 BENIGN ESSENTIAL HYPERTENSION: Primary | ICD-10-CM

## 2021-02-17 PROCEDURE — 99214 OFFICE O/P EST MOD 30 MIN: CPT | Performed by: FAMILY MEDICINE

## 2021-02-17 RX ORDER — ROSUVASTATIN CALCIUM 20 MG/1
20 TABLET, COATED ORAL DAILY
Qty: 90 TABLET | Refills: 3 | Status: SHIPPED | OUTPATIENT
Start: 2021-02-17

## 2021-02-17 RX ORDER — FAMOTIDINE 20 MG/1
20 TABLET, FILM COATED ORAL 2 TIMES DAILY
Qty: 180 TABLET | Refills: 1 | Status: SHIPPED | OUTPATIENT
Start: 2021-02-17 | End: 2021-10-05 | Stop reason: SDUPTHER

## 2021-02-17 NOTE — ASSESSMENT & PLAN NOTE
Lab Results   Component Value Date    CHOLESTEROL 133 08/12/2020    HDL 43 (L) 08/12/2020    LDLCALC 69 08/12/2020    TRIG 129 08/12/2020     The 10-year ASCVD risk score (Janina Bashir et al , 2013) is: 17 3%    Values used to calculate the score:      Age: 65 years      Sex: Female      Is Non- : No      Diabetic: No      Tobacco smoker: No      Systolic Blood Pressure: 160 mmHg      Is BP treated: Yes      HDL Cholesterol: 43 mg/dL      Total Cholesterol: 133 mg/dL    Reviewed healthy, low cholesterol diet  She is on rosuvastatin 20 mg, no side effects

## 2021-02-17 NOTE — ASSESSMENT & PLAN NOTE
DEXA scan in May 2019 T-score -2 0 in Lumbar spine, T-score -1 5 in left hip, T score -2 1 left femoral neck  Continue Vitamin D, Calcium supplements  Repeat DEXA after May 2021

## 2021-02-17 NOTE — ASSESSMENT & PLAN NOTE
Lab Results   Component Value Date    HGBA1C 5 5 08/12/2020    HGBA1C 5 8 (H) 06/04/2014    HGBA1C 5 8 (H) 12/04/2013     Lab Results   Component Value Date    LDLCALC 69 08/12/2020    CREATININE 0 93 11/19/2020     Most recent fasting glucose is normal  Counseled on healthy eating, regular physical activity

## 2021-02-24 ENCOUNTER — RADIATION THERAPY TREATMENT (OUTPATIENT)
Dept: RADIATION ONCOLOGY | Facility: HOSPITAL | Age: 71
End: 2021-02-24
Attending: RADIOLOGY
Payer: MEDICARE

## 2021-02-24 PROCEDURE — 77290 THER RAD SIMULAJ FIELD CPLX: CPT | Performed by: RADIOLOGY

## 2021-02-24 PROCEDURE — 77332 RADIATION TREATMENT AID(S): CPT | Performed by: RADIOLOGY

## 2021-02-26 DIAGNOSIS — D05.12 DUCTAL CARCINOMA IN SITU (DCIS) OF LEFT BREAST: Primary | ICD-10-CM

## 2021-02-26 PROCEDURE — 77295 3-D RADIOTHERAPY PLAN: CPT | Performed by: RADIOLOGY

## 2021-02-26 PROCEDURE — 77334 RADIATION TREATMENT AID(S): CPT | Performed by: RADIOLOGY

## 2021-02-26 PROCEDURE — 77300 RADIATION THERAPY DOSE PLAN: CPT | Performed by: RADIOLOGY

## 2021-03-01 ENCOUNTER — APPOINTMENT (OUTPATIENT)
Dept: RADIATION ONCOLOGY | Facility: HOSPITAL | Age: 71
End: 2021-03-01
Attending: RADIOLOGY
Payer: MEDICARE

## 2021-03-04 ENCOUNTER — APPOINTMENT (OUTPATIENT)
Dept: RADIATION ONCOLOGY | Facility: HOSPITAL | Age: 71
End: 2021-03-04
Attending: RADIOLOGY
Payer: MEDICARE

## 2021-03-04 ENCOUNTER — APPOINTMENT (OUTPATIENT)
Dept: RADIATION ONCOLOGY | Facility: HOSPITAL | Age: 71
End: 2021-03-04
Payer: MEDICARE

## 2021-03-04 PROCEDURE — 77412 RADIATION TX DELIVERY LVL 3: CPT | Performed by: RADIOLOGY

## 2021-03-04 PROCEDURE — 77280 THER RAD SIMULAJ FIELD SMPL: CPT | Performed by: RADIOLOGY

## 2021-03-05 ENCOUNTER — APPOINTMENT (OUTPATIENT)
Dept: RADIATION ONCOLOGY | Facility: HOSPITAL | Age: 71
End: 2021-03-05
Attending: RADIOLOGY
Payer: MEDICARE

## 2021-03-05 PROCEDURE — 77412 RADIATION TX DELIVERY LVL 3: CPT | Performed by: RADIOLOGY

## 2021-03-05 PROCEDURE — 77331 SPECIAL RADIATION DOSIMETRY: CPT | Performed by: RADIOLOGY

## 2021-03-05 PROCEDURE — 77402 RADIATION TX DELIVERY LVL 1: CPT | Performed by: RADIOLOGY

## 2021-03-05 PROCEDURE — 77387 GUIDANCE FOR RADJ TX DLVR: CPT | Performed by: RADIOLOGY

## 2021-03-08 PROCEDURE — 77412 RADIATION TX DELIVERY LVL 3: CPT | Performed by: RADIOLOGY

## 2021-03-08 PROCEDURE — 77387 GUIDANCE FOR RADJ TX DLVR: CPT | Performed by: RADIOLOGY

## 2021-03-09 ENCOUNTER — APPOINTMENT (OUTPATIENT)
Dept: RADIATION ONCOLOGY | Facility: HOSPITAL | Age: 71
End: 2021-03-09
Attending: RADIOLOGY
Payer: MEDICARE

## 2021-03-09 PROCEDURE — 77336 RADIATION PHYSICS CONSULT: CPT | Performed by: RADIOLOGY

## 2021-03-09 PROCEDURE — 77412 RADIATION TX DELIVERY LVL 3: CPT | Performed by: RADIOLOGY

## 2021-03-09 PROCEDURE — 77402 RADIATION TX DELIVERY LVL 1: CPT | Performed by: RADIOLOGY

## 2021-03-09 PROCEDURE — 77387 GUIDANCE FOR RADJ TX DLVR: CPT | Performed by: RADIOLOGY

## 2021-03-10 ENCOUNTER — APPOINTMENT (OUTPATIENT)
Dept: RADIATION ONCOLOGY | Facility: HOSPITAL | Age: 71
End: 2021-03-10
Attending: RADIOLOGY
Payer: MEDICARE

## 2021-03-11 ENCOUNTER — APPOINTMENT (OUTPATIENT)
Dept: RADIATION ONCOLOGY | Facility: HOSPITAL | Age: 71
End: 2021-03-11
Attending: RADIOLOGY
Payer: MEDICARE

## 2021-03-11 ENCOUNTER — APPOINTMENT (OUTPATIENT)
Dept: RADIATION ONCOLOGY | Facility: HOSPITAL | Age: 71
End: 2021-03-11
Payer: MEDICARE

## 2021-03-11 PROCEDURE — 77412 RADIATION TX DELIVERY LVL 3: CPT | Performed by: RADIOLOGY

## 2021-03-11 PROCEDURE — 77387 GUIDANCE FOR RADJ TX DLVR: CPT | Performed by: RADIOLOGY

## 2021-03-12 ENCOUNTER — APPOINTMENT (OUTPATIENT)
Dept: RADIATION ONCOLOGY | Facility: HOSPITAL | Age: 71
End: 2021-03-12
Attending: RADIOLOGY
Payer: MEDICARE

## 2021-03-12 PROCEDURE — 77412 RADIATION TX DELIVERY LVL 3: CPT | Performed by: RADIOLOGY

## 2021-03-12 PROCEDURE — 77387 GUIDANCE FOR RADJ TX DLVR: CPT | Performed by: RADIOLOGY

## 2021-03-15 ENCOUNTER — APPOINTMENT (OUTPATIENT)
Dept: RADIATION ONCOLOGY | Facility: HOSPITAL | Age: 71
End: 2021-03-15
Attending: RADIOLOGY
Payer: MEDICARE

## 2021-03-15 PROCEDURE — 77412 RADIATION TX DELIVERY LVL 3: CPT | Performed by: RADIOLOGY

## 2021-03-15 PROCEDURE — 77387 GUIDANCE FOR RADJ TX DLVR: CPT | Performed by: RADIOLOGY

## 2021-03-16 ENCOUNTER — APPOINTMENT (OUTPATIENT)
Dept: RADIATION ONCOLOGY | Facility: HOSPITAL | Age: 71
End: 2021-03-16
Attending: RADIOLOGY
Payer: MEDICARE

## 2021-03-16 LAB
BASOPHILS # BLD AUTO: 83 CELLS/UL (ref 0–200)
BASOPHILS NFR BLD AUTO: 1.5 %
EOSINOPHIL # BLD AUTO: 231 CELLS/UL (ref 15–500)
EOSINOPHIL NFR BLD AUTO: 4.2 %
ERYTHROCYTE [DISTWIDTH] IN BLOOD BY AUTOMATED COUNT: 12.6 % (ref 11–15)
HCT VFR BLD AUTO: 39 % (ref 35–45)
HGB BLD-MCNC: 12.9 G/DL (ref 11.7–15.5)
LYMPHOCYTES # BLD AUTO: 1216 CELLS/UL (ref 850–3900)
LYMPHOCYTES NFR BLD AUTO: 22.1 %
MCH RBC QN AUTO: 31.5 PG (ref 27–33)
MCHC RBC AUTO-ENTMCNC: 33.1 G/DL (ref 32–36)
MCV RBC AUTO: 95.4 FL (ref 80–100)
MONOCYTES # BLD AUTO: 666 CELLS/UL (ref 200–950)
MONOCYTES NFR BLD AUTO: 12.1 %
NEUTROPHILS # BLD AUTO: 3306 CELLS/UL (ref 1500–7800)
NEUTROPHILS NFR BLD AUTO: 60.1 %
PLATELET # BLD AUTO: 399 THOUSAND/UL (ref 140–400)
PMV BLD REES-ECKER: 9.5 FL (ref 7.5–12.5)
RBC # BLD AUTO: 4.09 MILLION/UL (ref 3.8–5.1)
WBC # BLD AUTO: 5.5 THOUSAND/UL (ref 3.8–10.8)

## 2021-03-16 PROCEDURE — 77387 GUIDANCE FOR RADJ TX DLVR: CPT | Performed by: RADIOLOGY

## 2021-03-16 PROCEDURE — 77336 RADIATION PHYSICS CONSULT: CPT | Performed by: RADIOLOGY

## 2021-03-16 PROCEDURE — 77412 RADIATION TX DELIVERY LVL 3: CPT | Performed by: RADIOLOGY

## 2021-03-17 ENCOUNTER — APPOINTMENT (OUTPATIENT)
Dept: RADIATION ONCOLOGY | Facility: HOSPITAL | Age: 71
End: 2021-03-17
Attending: RADIOLOGY
Payer: MEDICARE

## 2021-03-17 PROCEDURE — 77387 GUIDANCE FOR RADJ TX DLVR: CPT | Performed by: RADIOLOGY

## 2021-03-17 PROCEDURE — 77412 RADIATION TX DELIVERY LVL 3: CPT | Performed by: RADIOLOGY

## 2021-03-18 ENCOUNTER — APPOINTMENT (OUTPATIENT)
Dept: RADIATION ONCOLOGY | Facility: HOSPITAL | Age: 71
End: 2021-03-18
Attending: RADIOLOGY
Payer: MEDICARE

## 2021-03-18 PROCEDURE — 77387 GUIDANCE FOR RADJ TX DLVR: CPT | Performed by: RADIOLOGY

## 2021-03-18 PROCEDURE — 77412 RADIATION TX DELIVERY LVL 3: CPT | Performed by: RADIOLOGY

## 2021-03-19 ENCOUNTER — APPOINTMENT (OUTPATIENT)
Dept: RADIATION ONCOLOGY | Facility: HOSPITAL | Age: 71
End: 2021-03-19
Attending: RADIOLOGY
Payer: MEDICARE

## 2021-03-19 PROCEDURE — 77412 RADIATION TX DELIVERY LVL 3: CPT | Performed by: RADIOLOGY

## 2021-03-19 PROCEDURE — 77387 GUIDANCE FOR RADJ TX DLVR: CPT | Performed by: RADIOLOGY

## 2021-03-22 ENCOUNTER — APPOINTMENT (OUTPATIENT)
Dept: RADIATION ONCOLOGY | Facility: HOSPITAL | Age: 71
End: 2021-03-22
Attending: RADIOLOGY
Payer: MEDICARE

## 2021-03-22 DIAGNOSIS — I10 ESSENTIAL HYPERTENSION: ICD-10-CM

## 2021-03-22 PROCEDURE — 77412 RADIATION TX DELIVERY LVL 3: CPT | Performed by: RADIOLOGY

## 2021-03-22 PROCEDURE — 77387 GUIDANCE FOR RADJ TX DLVR: CPT | Performed by: RADIOLOGY

## 2021-03-22 RX ORDER — METOPROLOL SUCCINATE 50 MG/1
50 TABLET, EXTENDED RELEASE ORAL DAILY
Qty: 90 TABLET | Refills: 2 | Status: SHIPPED | OUTPATIENT
Start: 2021-03-22

## 2021-03-23 ENCOUNTER — TELEPHONE (OUTPATIENT)
Dept: GYNECOLOGIC ONCOLOGY | Facility: CLINIC | Age: 71
End: 2021-03-23

## 2021-03-23 ENCOUNTER — APPOINTMENT (OUTPATIENT)
Dept: RADIATION ONCOLOGY | Facility: HOSPITAL | Age: 71
End: 2021-03-23
Attending: RADIOLOGY
Payer: MEDICARE

## 2021-03-23 PROCEDURE — 77412 RADIATION TX DELIVERY LVL 3: CPT | Performed by: RADIOLOGY

## 2021-03-23 PROCEDURE — 77336 RADIATION PHYSICS CONSULT: CPT | Performed by: RADIOLOGY

## 2021-03-23 PROCEDURE — 77387 GUIDANCE FOR RADJ TX DLVR: CPT | Performed by: RADIOLOGY

## 2021-03-23 NOTE — TELEPHONE ENCOUNTER
Patient was returning call for rescheduling with Dr Lazaro Paniagua on 4/5  I see she was scheduled in between treatments so I did not move her  She can be reached at [phone] for rescheduling

## 2021-03-24 ENCOUNTER — APPOINTMENT (OUTPATIENT)
Dept: RADIATION ONCOLOGY | Facility: HOSPITAL | Age: 71
End: 2021-03-24
Attending: RADIOLOGY
Payer: MEDICARE

## 2021-03-24 ENCOUNTER — TELEPHONE (OUTPATIENT)
Dept: GYNECOLOGIC ONCOLOGY | Facility: CLINIC | Age: 71
End: 2021-03-24

## 2021-03-24 PROCEDURE — 77387 GUIDANCE FOR RADJ TX DLVR: CPT | Performed by: RADIOLOGY

## 2021-03-24 PROCEDURE — 77412 RADIATION TX DELIVERY LVL 3: CPT | Performed by: RADIOLOGY

## 2021-03-24 NOTE — TELEPHONE ENCOUNTER
Patient called to reschedule her 4/5/21 appointment with Dr Liza Shaver, however, she has questions about rescheduling while going through radiation treatments  She requested a call back from the nurse

## 2021-03-25 ENCOUNTER — APPOINTMENT (OUTPATIENT)
Dept: RADIATION ONCOLOGY | Facility: HOSPITAL | Age: 71
End: 2021-03-25
Attending: RADIOLOGY
Payer: MEDICARE

## 2021-03-25 PROCEDURE — 77387 GUIDANCE FOR RADJ TX DLVR: CPT | Performed by: RADIOLOGY

## 2021-03-25 PROCEDURE — 77412 RADIATION TX DELIVERY LVL 3: CPT | Performed by: RADIOLOGY

## 2021-03-26 ENCOUNTER — APPOINTMENT (OUTPATIENT)
Dept: RADIATION ONCOLOGY | Facility: HOSPITAL | Age: 71
End: 2021-03-26
Attending: RADIOLOGY
Payer: MEDICARE

## 2021-03-26 PROCEDURE — 77412 RADIATION TX DELIVERY LVL 3: CPT | Performed by: RADIOLOGY

## 2021-03-26 PROCEDURE — 77387 GUIDANCE FOR RADJ TX DLVR: CPT | Performed by: RADIOLOGY

## 2021-03-29 ENCOUNTER — APPOINTMENT (OUTPATIENT)
Dept: RADIATION ONCOLOGY | Facility: HOSPITAL | Age: 71
End: 2021-03-29
Attending: RADIOLOGY
Payer: MEDICARE

## 2021-03-29 PROCEDURE — 77387 GUIDANCE FOR RADJ TX DLVR: CPT | Performed by: RADIOLOGY

## 2021-03-29 PROCEDURE — 77412 RADIATION TX DELIVERY LVL 3: CPT | Performed by: RADIOLOGY

## 2021-03-30 ENCOUNTER — APPOINTMENT (OUTPATIENT)
Dept: RADIATION ONCOLOGY | Facility: HOSPITAL | Age: 71
End: 2021-03-30
Attending: RADIOLOGY
Payer: MEDICARE

## 2021-03-30 PROCEDURE — 77336 RADIATION PHYSICS CONSULT: CPT | Performed by: RADIOLOGY

## 2021-03-30 PROCEDURE — 77412 RADIATION TX DELIVERY LVL 3: CPT | Performed by: RADIOLOGY

## 2021-03-30 PROCEDURE — 77387 GUIDANCE FOR RADJ TX DLVR: CPT | Performed by: RADIOLOGY

## 2021-03-31 ENCOUNTER — APPOINTMENT (OUTPATIENT)
Dept: RADIATION ONCOLOGY | Facility: HOSPITAL | Age: 71
End: 2021-03-31
Attending: RADIOLOGY
Payer: MEDICARE

## 2021-03-31 PROCEDURE — 77387 GUIDANCE FOR RADJ TX DLVR: CPT | Performed by: RADIOLOGY

## 2021-03-31 PROCEDURE — 77321 SPECIAL TELETX PORT PLAN: CPT | Performed by: RADIOLOGY

## 2021-03-31 PROCEDURE — 77334 RADIATION TREATMENT AID(S): CPT | Performed by: RADIOLOGY

## 2021-03-31 PROCEDURE — 77412 RADIATION TX DELIVERY LVL 3: CPT | Performed by: RADIOLOGY

## 2021-04-01 ENCOUNTER — APPOINTMENT (OUTPATIENT)
Dept: RADIATION ONCOLOGY | Facility: HOSPITAL | Age: 71
End: 2021-04-01
Attending: RADIOLOGY
Payer: MEDICARE

## 2021-04-01 ENCOUNTER — APPOINTMENT (OUTPATIENT)
Dept: RADIATION ONCOLOGY | Facility: HOSPITAL | Age: 71
End: 2021-04-01
Payer: MEDICARE

## 2021-04-01 PROCEDURE — 77387 GUIDANCE FOR RADJ TX DLVR: CPT | Performed by: RADIOLOGY

## 2021-04-01 PROCEDURE — 77412 RADIATION TX DELIVERY LVL 3: CPT | Performed by: RADIOLOGY

## 2021-04-02 ENCOUNTER — APPOINTMENT (OUTPATIENT)
Dept: RADIATION ONCOLOGY | Facility: HOSPITAL | Age: 71
End: 2021-04-02
Attending: RADIOLOGY
Payer: MEDICARE

## 2021-04-02 PROCEDURE — 77417 THER RADIOLOGY PORT IMAGE(S): CPT | Performed by: RADIOLOGY

## 2021-04-02 PROCEDURE — 77387 GUIDANCE FOR RADJ TX DLVR: CPT | Performed by: RADIOLOGY

## 2021-04-02 PROCEDURE — 77412 RADIATION TX DELIVERY LVL 3: CPT | Performed by: RADIOLOGY

## 2021-04-05 ENCOUNTER — APPOINTMENT (OUTPATIENT)
Dept: RADIATION ONCOLOGY | Facility: HOSPITAL | Age: 71
End: 2021-04-05
Attending: RADIOLOGY
Payer: MEDICARE

## 2021-04-05 PROCEDURE — 77387 GUIDANCE FOR RADJ TX DLVR: CPT | Performed by: RADIOLOGY

## 2021-04-05 PROCEDURE — 77412 RADIATION TX DELIVERY LVL 3: CPT | Performed by: RADIOLOGY

## 2021-04-06 ENCOUNTER — APPOINTMENT (OUTPATIENT)
Dept: RADIATION ONCOLOGY | Facility: HOSPITAL | Age: 71
End: 2021-04-06
Attending: RADIOLOGY
Payer: MEDICARE

## 2021-04-06 PROCEDURE — 77336 RADIATION PHYSICS CONSULT: CPT | Performed by: RADIOLOGY

## 2021-04-06 PROCEDURE — 77387 GUIDANCE FOR RADJ TX DLVR: CPT | Performed by: RADIOLOGY

## 2021-04-06 PROCEDURE — 77412 RADIATION TX DELIVERY LVL 3: CPT | Performed by: RADIOLOGY

## 2021-04-07 ENCOUNTER — APPOINTMENT (OUTPATIENT)
Dept: RADIATION ONCOLOGY | Facility: HOSPITAL | Age: 71
End: 2021-04-07
Attending: RADIOLOGY
Payer: MEDICARE

## 2021-04-07 PROCEDURE — 77412 RADIATION TX DELIVERY LVL 3: CPT | Performed by: RADIOLOGY

## 2021-04-07 PROCEDURE — 77387 GUIDANCE FOR RADJ TX DLVR: CPT | Performed by: RADIOLOGY

## 2021-04-08 ENCOUNTER — APPOINTMENT (OUTPATIENT)
Dept: RADIATION ONCOLOGY | Facility: HOSPITAL | Age: 71
End: 2021-04-08
Attending: RADIOLOGY
Payer: MEDICARE

## 2021-04-08 PROCEDURE — 77387 GUIDANCE FOR RADJ TX DLVR: CPT | Performed by: RADIOLOGY

## 2021-04-08 PROCEDURE — 77412 RADIATION TX DELIVERY LVL 3: CPT | Performed by: RADIOLOGY

## 2021-04-09 PROCEDURE — 77412 RADIATION TX DELIVERY LVL 3: CPT | Performed by: RADIOLOGY

## 2021-04-09 PROCEDURE — 77331 SPECIAL RADIATION DOSIMETRY: CPT | Performed by: RADIOLOGY

## 2021-04-09 PROCEDURE — 77280 THER RAD SIMULAJ FIELD SMPL: CPT | Performed by: RADIOLOGY

## 2021-04-12 ENCOUNTER — APPOINTMENT (OUTPATIENT)
Dept: RADIATION ONCOLOGY | Facility: HOSPITAL | Age: 71
End: 2021-04-12
Attending: RADIOLOGY
Payer: MEDICARE

## 2021-04-12 PROCEDURE — 77387 GUIDANCE FOR RADJ TX DLVR: CPT | Performed by: RADIOLOGY

## 2021-04-12 PROCEDURE — 77412 RADIATION TX DELIVERY LVL 3: CPT | Performed by: RADIOLOGY

## 2021-04-13 ENCOUNTER — APPOINTMENT (OUTPATIENT)
Dept: RADIATION ONCOLOGY | Facility: HOSPITAL | Age: 71
End: 2021-04-13
Attending: RADIOLOGY
Payer: MEDICARE

## 2021-04-13 PROCEDURE — 77387 GUIDANCE FOR RADJ TX DLVR: CPT | Performed by: RADIOLOGY

## 2021-04-13 PROCEDURE — 77336 RADIATION PHYSICS CONSULT: CPT | Performed by: RADIOLOGY

## 2021-04-13 PROCEDURE — 77412 RADIATION TX DELIVERY LVL 3: CPT | Performed by: RADIOLOGY

## 2021-04-14 ENCOUNTER — APPOINTMENT (OUTPATIENT)
Dept: RADIATION ONCOLOGY | Facility: HOSPITAL | Age: 71
End: 2021-04-14
Attending: RADIOLOGY
Payer: MEDICARE

## 2021-04-14 PROCEDURE — 77387 GUIDANCE FOR RADJ TX DLVR: CPT | Performed by: RADIOLOGY

## 2021-04-14 PROCEDURE — 77412 RADIATION TX DELIVERY LVL 3: CPT | Performed by: RADIOLOGY

## 2021-04-15 ENCOUNTER — APPOINTMENT (OUTPATIENT)
Dept: RADIATION ONCOLOGY | Facility: HOSPITAL | Age: 71
End: 2021-04-15
Attending: RADIOLOGY
Payer: MEDICARE

## 2021-04-15 PROCEDURE — 77387 GUIDANCE FOR RADJ TX DLVR: CPT | Performed by: RADIOLOGY

## 2021-04-15 PROCEDURE — 77412 RADIATION TX DELIVERY LVL 3: CPT | Performed by: RADIOLOGY

## 2021-04-16 ENCOUNTER — APPOINTMENT (OUTPATIENT)
Dept: RADIATION ONCOLOGY | Facility: HOSPITAL | Age: 71
End: 2021-04-16
Attending: RADIOLOGY
Payer: MEDICARE

## 2021-04-16 ENCOUNTER — APPOINTMENT (OUTPATIENT)
Dept: RADIATION ONCOLOGY | Facility: HOSPITAL | Age: 71
End: 2021-04-16
Payer: MEDICARE

## 2021-04-16 PROCEDURE — 77412 RADIATION TX DELIVERY LVL 3: CPT | Performed by: RADIOLOGY

## 2021-04-16 PROCEDURE — 77387 GUIDANCE FOR RADJ TX DLVR: CPT | Performed by: RADIOLOGY

## 2021-04-19 ENCOUNTER — OFFICE VISIT (OUTPATIENT)
Dept: SURGICAL ONCOLOGY | Facility: CLINIC | Age: 71
End: 2021-04-19
Payer: MEDICARE

## 2021-04-19 ENCOUNTER — APPOINTMENT (OUTPATIENT)
Dept: RADIATION ONCOLOGY | Facility: HOSPITAL | Age: 71
End: 2021-04-19
Attending: RADIOLOGY
Payer: MEDICARE

## 2021-04-19 ENCOUNTER — APPOINTMENT (OUTPATIENT)
Dept: RADIATION ONCOLOGY | Facility: HOSPITAL | Age: 71
End: 2021-04-19
Payer: MEDICARE

## 2021-04-19 VITALS
HEART RATE: 74 BPM | BODY MASS INDEX: 27.05 KG/M2 | SYSTOLIC BLOOD PRESSURE: 138 MMHG | RESPIRATION RATE: 12 BRPM | TEMPERATURE: 98.8 F | WEIGHT: 147 LBS | HEIGHT: 62 IN | DIASTOLIC BLOOD PRESSURE: 80 MMHG

## 2021-04-19 DIAGNOSIS — D05.12 DUCTAL CARCINOMA IN SITU (DCIS) OF LEFT BREAST: Primary | ICD-10-CM

## 2021-04-19 PROCEDURE — 99213 OFFICE O/P EST LOW 20 MIN: CPT | Performed by: SURGERY

## 2021-04-19 PROCEDURE — 77387 GUIDANCE FOR RADJ TX DLVR: CPT | Performed by: RADIOLOGY

## 2021-04-19 PROCEDURE — 77412 RADIATION TX DELIVERY LVL 3: CPT | Performed by: RADIOLOGY

## 2021-04-19 NOTE — PROGRESS NOTES
Surgical Oncology Follow Up       [address]  [facility]  [address] [address]    Edson Burt  1950  [id number]  [address] [facility]  [facility]  [address] [address]    Chief Complaint   Patient presents with    Follow-up     3 month           Assessment & Plan:       Patient presents for a follow-up visit  She is nearly finished with her radiation therapy  She has no complaints  We will coordinate her mammograms and see her back in 6 months  Cancer History:     Oncology History   Ductal carcinoma in situ (DCIS) of left breast   10/23/2020 Biopsy    Left breast stereotactic biopsy  5 o'clock; middle portion  Ductal carcinoma in situ  Grade 2    WA 90    Malignancy appears unifocal  Calcifications cover 1 cm  US left axilla not performed  RIght breast clear  12/23/2020 Surgery    Left breast needle localized lumpectomy  Ductal carcinoma in situ  Grade 2  Posterior margin positive for DCIS   Stage 0       3/15/2021 -  Radiation    WBRT    Dr Xi Cerda           Interval History:     Patient was not recommended to have  Anti hormonal therapy  She has completed her radiation therapy for her DCIS  She is tolerating it relatively well  Review of Systems:   Review of Systems   Constitutional: Negative for activity change, appetite change and fatigue  HENT: Negative  Eyes: Negative  Respiratory: Negative for cough, shortness of breath and wheezing  Cardiovascular: Negative for chest pain and leg swelling  Gastrointestinal: Negative  Endocrine: Negative  Genitourinary: Negative  Musculoskeletal:        No new changes or complaints of bone pain   Skin: Negative  Allergic/Immunologic: Negative  Neurological: Negative  Hematological: Negative  Psychiatric/Behavioral: Negative          Past Medical History     Patient Active Problem List Diagnosis    Basal cell carcinoma of skin    Benign essential hypertension    Mixed hyperlipidemia    GERD without esophagitis    Osteoarthritis of knee    Osteopenia of multiple sites    Overweight with body mass index (BMI) of 27 to 27 9 in adult    IFG (impaired fasting glucose)    Ductal carcinoma in situ (DCIS) of left breast     Past Medical History:   Diagnosis Date    Basal cell carcinoma 2015    Right wrist    Basal cell carcinoma 09/2020    Right eye/cheek    Breast cancer ([address]     Cancer ([address]     [address]  [employer]    Fibrocystic breast disease     GERD (gastroesophageal reflux disease)     Hypercholesterolemia     Hypertension      Past Surgical History:   Procedure Laterality Date    BREAST EXCISIONAL BIOPSY Right     BREAST LUMPECTOMY Left 12/23/2020    Procedure: BREAST NEEDLE LOCALIZED LUMPECTOMY (NEEDLE LOC AT 0700);   Surgeon: Zane Dalal MD;  [facility];  Service: Surgical Oncology    COLONOSCOPY      MAMMO NEEDLE LOCALIZATION LEFT (ALL INC) Left 12/23/2020    MAMMO STEREOTACTIC BREAST BIOPSY LEFT (ALL INC) Left 10/23/2020     Family History   Problem Relation Age of Onset    Hypertension Mother     Thyroid disease Mother     Diabetes Brother     Kidney failure Brother     No Known Problems Father     No Known Problems Daughter     No Known Problems Maternal Grandmother     No Known Problems Maternal Grandfather     No Known Problems Paternal Grandmother     No Known Problems Paternal Grandfather     No Known Problems Daughter     No Known Problems Maternal Aunt     No Known Problems Maternal Aunt     No Known Problems Maternal Aunt     No Known Problems Maternal Aunt      Social History     Socioeconomic History    Marital status: /Civil Union     Spouse name: Not on file    Number of children: Not on file    Years of education: Not on file    Highest education level: Not on file   Occupational History    Not on file   Social Needs    Financial resource strain: Not on file    Food insecurity     Worry: Not on file     Inability: Not on file    Transportation needs     Medical: Not on file     Non-medical: Not on file   Tobacco Use    Smoking status: Never Smoker    Smokeless tobacco: Never Used   Substance and Sexual Activity    Alcohol use: No    Drug use: No    Sexual activity: Not Currently     Partners: Male     Birth control/protection: None   Lifestyle    Physical activity     Days per week: Not on file     Minutes per session: Not on file    Stress: Not on file   Relationships    Social connections     Talks on phone: Not on file     Gets together: Not on file     Attends Orthodoxy service: Not on file     Active member of club or organization: Not on file     Attends meetings of clubs or organizations: Not on file     Relationship status: Not on file    Intimate partner violence     Fear of current or ex partner: Not on file     Emotionally abused: Not on file     Physically abused: Not on file     Forced sexual activity: Not on file   Other Topics Concern    Not on file   Social History Narrative    Not on file       Current Outpatient Medications:     Acetaminophen (TYLENOL 8 HOUR PO), Take by mouth as needed, Disp: , Rfl:     calcium citrate-vitamin D (CITRACAL+D) 315-200 MG-UNIT per tablet, Take 1 tablet by mouth daily, Disp: , Rfl:     famotidine (PEPCID) 20 mg tablet, Take 1 tablet (20 mg total) by mouth 2 (two) times a day, Disp: 180 tablet, Rfl: 1    fluticasone (FLONASE) 50 mcg/act nasal spray, 1 spray into each nostril as needed , Disp: , Rfl:     ibuprofen (MOTRIN) 200 mg tablet, Take by mouth every 6 (six) hours as needed for mild pain, Disp: , Rfl:     lisinopril-hydrochlorothiazide (PRINZIDE,ZESTORETIC) 20-12 5 MG per tablet, TAKE 1 TABLET DAILY (Patient taking differently: Take 1 tablet by mouth daily with breakfast ), Disp: 90 tablet, Rfl: 1    loratadine (CLARITIN) 10 mg tablet, Take 1 tablet by mouth daily as needed, Disp: , Rfl:     metoprolol succinate (TOPROL-XL) 50 mg 24 hr tablet, Take 1 tablet (50 mg total) by mouth daily, Disp: 90 tablet, Rfl: 2    multivitamin-minerals (CENTRUM) tablet, Take 1 tablet by mouth daily, Disp: , Rfl:     rosuvastatin (CRESTOR) 20 MG tablet, Take 1 tablet (20 mg total) by mouth daily, Disp: 90 tablet, Rfl: 3  No Known Allergies    Physical Exam:     Vitals:    04/19/21 1157   BP: 138/80   Pulse: 74   Resp: 12   Temp: 98 8 °F (37 1 °C)     Physical Exam  Vitals signs reviewed  Constitutional:       Appearance: She is well-developed  HENT:      Head: Normocephalic and atraumatic  Eyes:      Pupils: Pupils are equal, round, and reactive to light  Neck:      Musculoskeletal: Normal range of motion and neck supple  Thyroid: No thyromegaly  Vascular: No JVD  Trachea: No tracheal deviation  Cardiovascular:      Rate and Rhythm: Normal rate and regular rhythm  Heart sounds: Normal heart sounds  No murmur  No friction rub  No gallop  Pulmonary:      Effort: Pulmonary effort is normal  No respiratory distress  Breath sounds: Normal breath sounds  No wheezing or rales  Chest:      Comments: The right breast was examined in the sitting and supine position  There are no worrisome skin changes, tenderness, inverted nipple, nipple discharge, swelling, bleeding or evidence of a mass in any quadrant  Harleen survey demonstrated no evidence of any clinically suspicious axillary, pectoral or paraclavicular lymph nodes  Examination of the left breast demonstrates post radiation changes  There are no dominant masses or axillary adenopathy  Abdominal:      General: There is no distension  Palpations: Abdomen is soft  There is no hepatomegaly or mass  Tenderness: There is no abdominal tenderness  There is no guarding or rebound  Musculoskeletal: Normal range of motion  General: No tenderness     Lymphadenopathy:      Cervical: No cervical adenopathy  Skin:     General: Skin is warm and dry  Findings: No erythema or rash  Neurological:      Mental Status: She is alert and oriented to person, place, and time  Cranial Nerves: No cranial nerve deficit  Psychiatric:         Behavior: Behavior normal            Results & Discussion:     Patient is free of any evidence of local regional or distant recurrence disease  We will see her back in 6 months  We will coordinate her imaging  Advance Care Planning/Advance Directives:  I discussed the disease status, treatment plans and follow-up with the patient

## 2021-04-20 ENCOUNTER — APPOINTMENT (OUTPATIENT)
Dept: RADIATION ONCOLOGY | Facility: HOSPITAL | Age: 71
End: 2021-04-20
Attending: RADIOLOGY
Payer: MEDICARE

## 2021-04-20 PROCEDURE — 77336 RADIATION PHYSICS CONSULT: CPT | Performed by: RADIOLOGY

## 2021-04-20 PROCEDURE — 77412 RADIATION TX DELIVERY LVL 3: CPT | Performed by: RADIOLOGY

## 2021-04-20 PROCEDURE — 77387 GUIDANCE FOR RADJ TX DLVR: CPT | Performed by: RADIOLOGY

## 2021-04-27 DIAGNOSIS — I10 ESSENTIAL HYPERTENSION: ICD-10-CM

## 2021-04-27 RX ORDER — LISINOPRIL AND HYDROCHLOROTHIAZIDE 20; 12.5 MG/1; MG/1
1 TABLET ORAL DAILY
Qty: 90 TABLET | Refills: 1 | Status: SHIPPED | OUTPATIENT
Start: 2021-04-27 | End: 2021-12-06 | Stop reason: SDUPTHER

## 2021-05-27 ENCOUNTER — TELEMEDICINE (OUTPATIENT)
Dept: RADIATION ONCOLOGY | Facility: HOSPITAL | Age: 71
End: 2021-05-27
Attending: RADIOLOGY

## 2021-05-27 DIAGNOSIS — D05.12 DUCTAL CARCINOMA IN SITU (DCIS) OF LEFT BREAST: Primary | ICD-10-CM

## 2021-05-27 NOTE — PROGRESS NOTES
Virtual Brief Visit    Assessment/Plan:  The patient is 1 month status post radiation therapy for left breast carcinoma  She states she has slight discoloration no desquamation  We discussed skin care and continuation of breast massage  She will return for follow-up visit in 6 months  Problem List Items Addressed This Visit        Other    Ductal carcinoma in situ (DCIS) of left breast - Primary                Reason for visit is No chief complaint on file  Encounter provider Steffanie Kapadia MD    Provider located at [address] [address]    Recent Visits  No visits were found meeting these conditions  Showing recent visits within past 7 days and meeting all other requirements     Future Appointments  No visits were found meeting these conditions  Showing future appointments within next 150 days and meeting all other requirements        After connecting through telephone, the patient was identified by name and date of birth  Peggy Moon was informed that this is a telemedicine visit and that the visit is being conducted through telephone  My office door was closed  No one else was in the room  She acknowledged consent and understanding of privacy and security of the platform  The patient has agreed to participate and understands she can discontinue the visit at any time  Patient is aware this is a billable service       Subjective      Past Medical History:   Diagnosis Date    Basal cell carcinoma 2015    Right wrist    Basal cell carcinoma 09/2020    Right eye/cheek    Breast cancer ([address]     Cancer ([facility] [address] [facility]     [address]  [employer]    Fibrocystic breast disease     GERD (gastroesophageal reflux disease)     Hypercholesterolemia     Hypertension        Past Surgical History:   Procedure Laterality Date    BREAST EXCISIONAL BIOPSY Right     BREAST LUMPECTOMY Left 12/23/2020    Procedure: BREAST NEEDLE LOCALIZED LUMPECTOMY (NEEDLE LOC AT 0700); Surgeon: Ike Piña MD;  [facility];  Service: Surgical Oncology    COLONOSCOPY      MAMMO NEEDLE LOCALIZATION LEFT (ALL INC) Left 12/23/2020    MAMMO STEREOTACTIC BREAST BIOPSY LEFT (ALL INC) Left 10/23/2020       Current Outpatient Medications   Medication Sig Dispense Refill    Acetaminophen (TYLENOL 8 HOUR PO) Take by mouth as needed      calcium citrate-vitamin D (CITRACAL+D) 315-200 MG-UNIT per tablet Take 1 tablet by mouth daily      famotidine (PEPCID) 20 mg tablet Take 1 tablet (20 mg total) by mouth 2 (two) times a day 180 tablet 1    fluticasone (FLONASE) 50 mcg/act nasal spray 1 spray into each nostril as needed       ibuprofen (MOTRIN) 200 mg tablet Take by mouth every 6 (six) hours as needed for mild pain      lisinopril-hydrochlorothiazide (PRINZIDE,ZESTORETIC) 20-12 5 MG per tablet Take 1 tablet by mouth daily 90 tablet 1    loratadine (CLARITIN) 10 mg tablet Take 1 tablet by mouth daily as needed      metoprolol succinate (TOPROL-XL) 50 mg 24 hr tablet Take 1 tablet (50 mg total) by mouth daily 90 tablet 2    multivitamin-minerals (CENTRUM) tablet Take 1 tablet by mouth daily      rosuvastatin (CRESTOR) 20 MG tablet Take 1 tablet (20 mg total) by mouth daily 90 tablet 3     No current facility-administered medications for this visit  No Known Allergies    Review of Systems    There were no vitals filed for this visit  I spent 15 minutes directly with the patient during this visit    VIRTUAL VISIT Peggy Ramachandran acknowledges that she has consented to an online visit or consultation  She understands that the online visit is based solely on information provided by her, and that, in the absence of a face-to-face physical evaluation by the physician, the diagnosis she receives is both limited and provisional in terms of accuracy and completeness   This is not intended to replace a full medical face-to-face evaluation by the physician  Wilton Swift understands and accepts these terms

## 2021-07-28 ENCOUNTER — OFFICE VISIT (OUTPATIENT)
Dept: URGENT CARE | Facility: MEDICAL CENTER | Age: 71
End: 2021-07-28
Payer: MEDICARE

## 2021-07-28 ENCOUNTER — APPOINTMENT (OUTPATIENT)
Dept: RADIOLOGY | Facility: MEDICAL CENTER | Age: 71
End: 2021-07-28
Payer: MEDICARE

## 2021-07-28 VITALS
SYSTOLIC BLOOD PRESSURE: 132 MMHG | OXYGEN SATURATION: 95 % | TEMPERATURE: 98 F | HEIGHT: 62 IN | WEIGHT: 149 LBS | BODY MASS INDEX: 27.42 KG/M2 | RESPIRATION RATE: 18 BRPM | DIASTOLIC BLOOD PRESSURE: 70 MMHG

## 2021-07-28 DIAGNOSIS — M79.672 LEFT FOOT PAIN: Primary | ICD-10-CM

## 2021-07-28 DIAGNOSIS — M79.672 LEFT FOOT PAIN: ICD-10-CM

## 2021-07-28 PROCEDURE — G0463 HOSPITAL OUTPT CLINIC VISIT: HCPCS | Performed by: PHYSICIAN ASSISTANT

## 2021-07-28 PROCEDURE — 99213 OFFICE O/P EST LOW 20 MIN: CPT | Performed by: PHYSICIAN ASSISTANT

## 2021-07-28 PROCEDURE — 73630 X-RAY EXAM OF FOOT: CPT

## 2021-07-29 NOTE — PROGRESS NOTES
[address] [facility]        NAME: Harper Anderson is a 65 y o  female  : 1950    MRN: [id number]  DATE: 2021  TIME: 8:59 PM    Assessment and Plan   Left foot pain [M79 [id number]]  1  Left foot pain  XR foot 3+ vw left         Patient Instructions     Left foot pain/ calcaneus spur  Rest, ice, elevate  Follow up with PCP in 3-5 days  Proceed to  ER if symptoms worsen  Chief Complaint     Chief Complaint   Patient presents with    Foot Pain     left heel pain x 7 days          History of Present Illness       64 y/o female presents c/o left foot pain x 7 days  Patient denies h/o trauma, fever, chills      Review of Systems   Review of Systems   Constitutional: Negative  HENT: Negative  Eyes: Negative  Respiratory: Negative  Negative for cough, chest tightness, shortness of breath, wheezing and stridor  Cardiovascular: Negative  Negative for chest pain, palpitations and leg swelling  Musculoskeletal: Positive for arthralgias           Current Medications       Current Outpatient Medications:     Acetaminophen (TYLENOL 8 HOUR PO), Take by mouth as needed, Disp: , Rfl:     calcium citrate-vitamin D (CITRACAL+D) 315-200 MG-UNIT per tablet, Take 1 tablet by mouth daily, Disp: , Rfl:     famotidine (PEPCID) 20 mg tablet, Take 1 tablet (20 mg total) by mouth 2 (two) times a day, Disp: 180 tablet, Rfl: 1    fluticasone (FLONASE) 50 mcg/act nasal spray, 1 spray into each nostril as needed , Disp: , Rfl:     ibuprofen (MOTRIN) 200 mg tablet, Take by mouth every 6 (six) hours as needed for mild pain, Disp: , Rfl:     lisinopril-hydrochlorothiazide (PRINZIDE,ZESTORETIC) 20-12 5 MG per tablet, Take 1 tablet by mouth daily, Disp: 90 tablet, Rfl: 1    loratadine (CLARITIN) 10 mg tablet, Take 1 tablet by mouth daily as needed, Disp: , Rfl:     metoprolol succinate (TOPROL-XL) 50 mg 24 hr tablet, Take 1 tablet (50 mg total) by mouth daily, Disp: 90 tablet, Rfl: 2    multivitamin-minerals (CENTRUM) tablet, Take 1 tablet by mouth daily, Disp: , Rfl:     rosuvastatin (CRESTOR) 20 MG tablet, Take 1 tablet (20 mg total) by mouth daily, Disp: 90 tablet, Rfl: 3    Current Allergies     Allergies as of 07/28/2021    (No Known Allergies)            The following portions of the patient's history were reviewed and updated as appropriate: allergies, current medications, past family history, past medical history, past social history, past surgical history and problem list      Past Medical History:   Diagnosis Date    Basal cell carcinoma 2015    Right wrist    Basal cell carcinoma 09/2020    Right eye/cheek    [facility] [address] [facility]     Cancer ([facility] [address] )     [address]    Fibrocystic breast disease     GERD (gastroesophageal reflux disease)     Hypercholesterolemia     Hypertension        Past Surgical History:   Procedure Laterality Date    BREAST EXCISIONAL BIOPSY Right     BREAST LUMPECTOMY Left 12/23/2020    Procedure: BREAST NEEDLE LOCALIZED LUMPECTOMY (NEEDLE LOC AT 0700); Surgeon: Aurora Bryan MD;  [facility];  Service: Surgical Oncology    COLONOSCOPY      MAMMO NEEDLE LOCALIZATION LEFT ([employer]) Left 12/23/2020    MAMMO STEREOTACTIC BREAST BIOPSY LEFT ([employer]) Left 10/23/2020       Family History   Problem Relation Age of Onset    Hypertension Mother     Thyroid disease Mother     Diabetes Brother     Kidney failure Brother     No Known Problems Father     No Known Problems Daughter     No Known Problems Maternal Grandmother     No Known Problems Maternal Grandfather     No Known Problems Paternal Grandmother     No Known Problems Paternal Grandfather     No Known Problems Daughter     No Known Problems Maternal Aunt     No Known Problems Maternal Aunt     No Known Problems Maternal Aunt     No Known Problems Maternal Aunt          Medications have been verified          Objective   /70   Temp 98 °F (36 7 °C)   Resp 18   Ht 5' 2" (1 575 m)   Wt 67 6 kg (149 lb) SpO2 95%   BMI 27 25 kg/m²        Physical Exam     Physical Exam  Constitutional:       Appearance: She is well-developed and normal weight  HENT:      Head: Normocephalic and atraumatic  Right Ear: External ear normal       Left Ear: External ear normal    Cardiovascular:      Rate and Rhythm: Normal rate and regular rhythm  Heart sounds: Normal heart sounds  Pulmonary:      Effort: Pulmonary effort is normal  No respiratory distress  Breath sounds: Normal breath sounds  No wheezing or rales  Chest:      Chest wall: No tenderness  Musculoskeletal:      Cervical back: Normal range of motion and neck supple  Right foot: Normal       Left foot: Normal range of motion  Tenderness and bony tenderness present  No swelling, deformity, bunion, Charcot foot, foot drop, prominent metatarsal heads or laceration  Legs:    Lymphadenopathy:      Cervical: No cervical adenopathy  Neurological:      Mental Status: She is alert

## 2021-07-29 NOTE — PATIENT INSTRUCTIONS
Left foot pain/ calcaneus spur  Rest, ice, elevate  Follow up with PCP in 3-5 days  Proceed to  ER if symptoms worsen  Plantar Fasciitis   WHAT YOU NEED TO KNOW:   PF is swelling of the plantar fascia  The plantar fascia is a thick band of tissue that connects your heel bone to your toes  This part of your foot helps support the arch of your foot and absorbs shock  DISCHARGE INSTRUCTIONS:   Call your doctor if:   · Your pain or swelling suddenly increase  · You develop knee, hip, or back pain  · You have questions or concerns about your condition or care  Medicines: You may  need any of the following:  · Acetaminophen  decreases pain and fever  It is available without a doctor's order  Ask how much to take and how often to take it  Follow directions  Read the labels of all other medicines you are using to see if they also contain acetaminophen, or ask your doctor or pharmacist  Acetaminophen can cause liver damage if not taken correctly  Do not use more than 4 grams (4,000 milligrams) total of acetaminophen in one day  · NSAIDs , such as ibuprofen, help decrease swelling, pain, and fever  NSAIDs can cause stomach bleeding or kidney problems in certain people  If you take blood thinner medicine, always ask your healthcare provider if NSAIDs are safe for you  Always read the medicine label and follow directions  · Take your medicine as directed  Contact your healthcare provider if you think your medicine is not helping or if you have side effects  Tell him of her if you are allergic to any medicine  Keep a list of the medicines, vitamins, and herbs you take  Include the amounts, and when and why you take them  Bring the list or the pill bottles to follow-up visits  Carry your medicine list with you in case of an emergency  Self-care:   · Wear your splint or shoe inserts as directed  You may need to wear a splint at night to keep your foot stretched while you sleep   This will help prevent sharp pain first thing in the morning  Shoe inserts will help decrease stress on your plantar fascia when you walk or exercise  · Apply ice on your plantar fascia  Ice helps prevent tissue damage and decreases swelling and pain  Fill a water bottle with water and freeze it  Wrap a towel around the bottle or cover it with a pillow case  Roll the water bottle under your foot for 10 minutes in the morning and after work  · Massage your plantar fascia as directed  This may help decrease swelling and pain  Roll a golf ball under your foot for 10 minutes  Repeat 3 times each day  · Go to physical therapy as directed  A physical therapist teaches you exercises to help improve movement and strength, and to decrease pain  Prevent plantar fasciitis:   · Maintain a healthy weight  This will help decrease stress on your feet  Ask your healthcare provider how much you should weigh  Ask him to help you create a weight loss plan if you are overweight  · Do low-impact exercises  Low-impact exercises decrease stress on your plantar fascia  Examples include swimming or bicycling  · Start new activities slowly  Increase the intensity and time gradually  · Wear shoes that fit well and support your arch  Replace your shoes before the padding or shock absorption wears out  Do not walk or  bare feet or sandals for long periods of time  Follow up with your doctor as directed:  Write down your questions so you remember to ask them during your visits  © Copyright [employer] 2021 Information is for End User's use only and may not be sold, redistributed or otherwise used for commercial purposes  All illustrations and images included in CareNotes® are the copyrighted property of A D [employer] M [employer]  or [facility] Maddy Howard   The above information is an  only  It is not intended as medical advice for individual conditions or treatments   Talk to your doctor, nurse or pharmacist before following any medical regimen to see if it is safe and effective for you

## 2021-08-12 ENCOUNTER — OFFICE VISIT (OUTPATIENT)
Dept: FAMILY MEDICINE CLINIC | Facility: CLINIC | Age: 71
End: 2021-08-12
Payer: MEDICARE

## 2021-08-12 VITALS
DIASTOLIC BLOOD PRESSURE: 72 MMHG | HEART RATE: 80 BPM | RESPIRATION RATE: 16 BRPM | HEIGHT: 62 IN | SYSTOLIC BLOOD PRESSURE: 118 MMHG | TEMPERATURE: 96 F | WEIGHT: 151 LBS | BODY MASS INDEX: 27.79 KG/M2

## 2021-08-12 DIAGNOSIS — M72.2 PLANTAR FASCIITIS: Primary | ICD-10-CM

## 2021-08-12 PROCEDURE — 99213 OFFICE O/P EST LOW 20 MIN: CPT | Performed by: FAMILY MEDICINE

## 2021-08-12 NOTE — PATIENT INSTRUCTIONS
Plantar Fasciitis Exercises   WHAT YOU NEED TO KNOW:   What are plantar fasciitis exercises? Plantar fasciitis exercises help stretch and strengthen your foot muscles  This helps decrease stress on the plantar fascia  It may also help prevent plantar fasciitis from getting worse or coming back  Ask your healthcare provider when to start these exercises and how often to do them  Stop if you have pain  How are plantar fasciitis exercises done? · Slant board stretch:  Stand on a slanted board with your toes higher than your heel  Press your heel into the board  Keep your knee slightly bent  Hold this position for 1 minute  Repeat 5 times  · Heel raise:  Stand on your injured foot  Lift your other foot off the ground by bending your knee  Hold onto a chair or wall for balance  Raise up on your toes  Hold for 1 second and slowly lower to the ground  Repeat 10 times  · Calf stretch:  Step forward so that your uninjured foot is in front of your injured foot  Stand with your forward leg bent and your back leg straight  Gently lean forward until you feel your calf stretch  Hold for 10 seconds and relax  Repeat 20 times  · Toe curls:  Place a towel on the floor  Put your foot flat on the towel  Grab the towel with your toes by curling them under  Slowly straighten and curl your toes to pull the towel toward you  · Toe taps:  Sit down and place your foot flat on the floor  Keep your heel on the floor  Point all your toes up toward the ceiling  While the 4 smaller toes are pointed up, bend your big toe down and tap it on the ground  Do 10 to 50 taps  Point all 5 toes up toward the ceiling again  This time keep your big toe pointed up and tap the 4 smaller toes on the ground  Do 10 to 50 taps each time  When should I contact my healthcare provider? · Your pain and swelling increase  · You develop new knee, hip, or back pain      · You have questions or concerns about your condition or care  CARE AGREEMENT:   You have the right to help plan your care  Learn about your health condition and how it may be treated  Discuss treatment options with your caregivers to decide what care you want to receive  You always have the right to refuse treatment  The above information is an  only  It is not intended as medical advice for individual conditions or treatments  Talk to your doctor, nurse or pharmacist before following any medical regimen to see if it is safe and effective for you  © 2016 [address] is for End User's use only and may not be sold, redistributed or otherwise used for commercial purposes  All illustrations and images included in CareNotes® are the copyrighted property of [employer]  or Omar Nuñez

## 2021-08-12 NOTE — PROGRESS NOTES
FAMILY MEDICINE PROGRESS NOTE    Date of Service: 21  Primary Care Provider:   Tete Simms MD       Name: Irma De La Cruz       : 1950       Age:70 y o  Sex: female      MRN: [id number]      Chief Complaint:Foot Pain (left heel)       ASSESSMENT and PLAN:  Irma De La Cruz is a 65 y o  female with:     Problem List Items Addressed This Visit     None      Visit Diagnoses     Plantar fasciitis    -  Primary        Pain due to plantar fasciitis, likely due to increased use of flip flops  Recommended stretches and strengthening exercises  Return is symptoms do not improve  SUBJECTIVE:  Irma De La Cruz is a 65 y o  female who presents today with a chief complaint of Foot Pain (left heel)  HPI     Patient was seen at Urgent Care on  with left foot pain  She had an xray showing plantar and retrocalcaneal heel spurs  She reports that this started 2 weeks  She reports that pain is worse when she first stands up and then gets more tolerable as she walks  She denies any injury, trauma, change in activity  She has been wearing flip flops more in the summer  She tried wearing gel cushions which did not help  Review of Systems   Musculoskeletal: Positive for arthralgias (left foot pain)  I have reviewed the patient's Past Medical History      Current Outpatient Medications:     Acetaminophen (TYLENOL 8 HOUR PO), Take by mouth as needed, Disp: , Rfl:     calcium citrate-vitamin D (CITRACAL+D) 315-200 MG-UNIT per tablet, Take 1 tablet by mouth daily, Disp: , Rfl:     famotidine (PEPCID) 20 mg tablet, Take 1 tablet (20 mg total) by mouth 2 (two) times a day, Disp: 180 tablet, Rfl: 1    fluticasone (FLONASE) 50 mcg/act nasal spray, 1 spray into each nostril as needed , Disp: , Rfl:     ibuprofen (MOTRIN) 200 mg tablet, Take by mouth every 6 (six) hours as needed for mild pain, Disp: , Rfl:     lisinopril-hydrochlorothiazide (PRINZIDE,ZESTORETIC) 20-12 5 MG per tablet, Take 1 tablet by mouth daily, Disp: 90 tablet, Rfl: 1    loratadine (CLARITIN) 10 mg tablet, Take 1 tablet by mouth daily as needed, Disp: , Rfl:     metoprolol succinate (TOPROL-XL) 50 mg 24 hr tablet, Take 1 tablet (50 mg total) by mouth daily, Disp: 90 tablet, Rfl: 2    multivitamin-minerals (CENTRUM) tablet, Take 1 tablet by mouth daily, Disp: , Rfl:     rosuvastatin (CRESTOR) 20 MG tablet, Take 1 tablet (20 mg total) by mouth daily (Patient taking differently: Take 20 mg by mouth every other day ), Disp: 90 tablet, Rfl: 3    OBJECTIVE:  /72   Pulse 80   Temp (!) 96 °F (35 6 °C)   Resp 16   Ht 5' 2" (1 575 m)   Wt 68 5 kg (151 lb)   BMI 27 62 kg/m²    BP Readings from Last 3 Encounters:   08/12/21 118/72   07/28/21 132/70   04/19/21 138/80      Wt Readings from Last 3 Encounters:   08/12/21 68 5 kg (151 lb)   07/28/21 67 6 kg (149 lb)   04/19/21 66 7 kg (147 lb)      Physical Exam  Constitutional:       General: She is not in acute distress  Appearance: Normal appearance  She is not ill-appearing or toxic-appearing  Pulmonary:      Effort: Pulmonary effort is normal  No respiratory distress  Musculoskeletal:      Cervical back: Normal range of motion  Left foot: Normal range of motion  Tenderness (over heel and insertion of plantar fascia ) present  No swelling, bunion, foot drop, prominent metatarsal heads or bony tenderness  Normal pulse  Neurological:      Mental Status: She is alert  Return if symptoms worsen or fail to improve  Jordan Najjar, MD    Note: Portions of the record have been created with voice recognition software  Occasional wrong word or "sound a like" substitutions may have occurred due to the inherent limitations of voice recognition software  Read the chart carefully and recognize, using context, where substitutions have occurred

## 2021-10-05 ENCOUNTER — CLINICAL SUPPORT (OUTPATIENT)
Dept: FAMILY MEDICINE CLINIC | Facility: CLINIC | Age: 71
End: 2021-10-05
Payer: MEDICARE

## 2021-10-05 DIAGNOSIS — Z23 ENCOUNTER FOR IMMUNIZATION: Primary | ICD-10-CM

## 2021-10-05 DIAGNOSIS — K21.9 GERD WITHOUT ESOPHAGITIS: ICD-10-CM

## 2021-10-05 PROCEDURE — 90662 IIV NO PRSV INCREASED AG IM: CPT

## 2021-10-05 PROCEDURE — G0008 ADMIN INFLUENZA VIRUS VAC: HCPCS

## 2021-10-05 RX ORDER — FAMOTIDINE 20 MG/1
20 TABLET, FILM COATED ORAL 2 TIMES DAILY
Qty: 180 TABLET | Refills: 1 | Status: SHIPPED | OUTPATIENT
Start: 2021-10-05

## 2021-10-11 LAB
ALBUMIN SERPL-MCNC: 4.2 G/DL (ref 3.6–5.1)
ALBUMIN/GLOB SERPL: 1.8 (CALC) (ref 1–2.5)
ALP SERPL-CCNC: 49 U/L (ref 37–153)
ALT SERPL-CCNC: 19 U/L (ref 6–29)
AST SERPL-CCNC: 18 U/L (ref 10–35)
BILIRUB SERPL-MCNC: 0.7 MG/DL (ref 0.2–1.2)
BUN SERPL-MCNC: 15 MG/DL (ref 7–25)
BUN/CREAT SERPL: ABNORMAL (CALC) (ref 6–22)
CALCIUM SERPL-MCNC: 9.9 MG/DL (ref 8.6–10.4)
CHLORIDE SERPL-SCNC: 102 MMOL/L (ref 98–110)
CO2 SERPL-SCNC: 29 MMOL/L (ref 20–32)
CREAT SERPL-MCNC: 0.74 MG/DL (ref 0.6–0.93)
GLOBULIN SER CALC-MCNC: 2.4 G/DL (CALC) (ref 1.9–3.7)
GLUCOSE SERPL-MCNC: 106 MG/DL (ref 65–99)
POTASSIUM SERPL-SCNC: 4.5 MMOL/L (ref 3.5–5.3)
PROT SERPL-MCNC: 6.6 G/DL (ref 6.1–8.1)
SL AMB EGFR AFRICAN AMERICAN: 93 ML/MIN/1.73M2
SL AMB EGFR NON AFRICAN AMERICAN: 80 ML/MIN/1.73M2
SODIUM SERPL-SCNC: 137 MMOL/L (ref 135–146)

## 2021-10-18 ENCOUNTER — OFFICE VISIT (OUTPATIENT)
Dept: FAMILY MEDICINE CLINIC | Facility: CLINIC | Age: 71
End: 2021-10-18
Payer: MEDICARE

## 2021-10-18 VITALS
TEMPERATURE: 98.5 F | WEIGHT: 150 LBS | RESPIRATION RATE: 18 BRPM | BODY MASS INDEX: 27.6 KG/M2 | HEART RATE: 69 BPM | DIASTOLIC BLOOD PRESSURE: 86 MMHG | SYSTOLIC BLOOD PRESSURE: 128 MMHG | OXYGEN SATURATION: 98 % | HEIGHT: 62 IN

## 2021-10-18 DIAGNOSIS — M85.89 OSTEOPENIA OF MULTIPLE SITES: ICD-10-CM

## 2021-10-18 DIAGNOSIS — D05.12 DUCTAL CARCINOMA IN SITU (DCIS) OF LEFT BREAST: ICD-10-CM

## 2021-10-18 DIAGNOSIS — M76.32 ILIOTIBIAL BAND SYNDROME OF LEFT SIDE: ICD-10-CM

## 2021-10-18 DIAGNOSIS — E66.3 OVERWEIGHT WITH BODY MASS INDEX (BMI) OF 27 TO 27.9 IN ADULT: ICD-10-CM

## 2021-10-18 DIAGNOSIS — M72.2 PLANTAR FASCIITIS: ICD-10-CM

## 2021-10-18 DIAGNOSIS — R73.01 IFG (IMPAIRED FASTING GLUCOSE): ICD-10-CM

## 2021-10-18 DIAGNOSIS — K21.9 GERD WITHOUT ESOPHAGITIS: ICD-10-CM

## 2021-10-18 DIAGNOSIS — M70.62 GREATER TROCHANTERIC BURSITIS OF LEFT HIP: ICD-10-CM

## 2021-10-18 DIAGNOSIS — M17.12 PRIMARY OSTEOARTHRITIS OF LEFT KNEE: ICD-10-CM

## 2021-10-18 DIAGNOSIS — I10 BENIGN ESSENTIAL HYPERTENSION: ICD-10-CM

## 2021-10-18 DIAGNOSIS — Z00.00 MEDICARE ANNUAL WELLNESS VISIT, SUBSEQUENT: Primary | ICD-10-CM

## 2021-10-18 DIAGNOSIS — E78.2 MIXED HYPERLIPIDEMIA: ICD-10-CM

## 2021-10-18 PROCEDURE — G0439 PPPS, SUBSEQ VISIT: HCPCS | Performed by: FAMILY MEDICINE

## 2021-10-18 PROCEDURE — 99214 OFFICE O/P EST MOD 30 MIN: CPT | Performed by: FAMILY MEDICINE

## 2021-10-19 ENCOUNTER — HOSPITAL ENCOUNTER (OUTPATIENT)
Dept: MAMMOGRAPHY | Facility: CLINIC | Age: 71
Discharge: HOME | End: 2021-10-19
Payer: MEDICARE

## 2021-10-19 VITALS — BODY MASS INDEX: 28.32 KG/M2 | WEIGHT: 150 LBS | HEIGHT: 61 IN

## 2021-10-19 DIAGNOSIS — D05.12 DUCTAL CARCINOMA IN SITU (DCIS) OF LEFT BREAST: ICD-10-CM

## 2021-10-19 PROBLEM — Z86.000 HISTORY OF DUCTAL CARCINOMA IN SITU (DCIS) OF BREAST: Status: ACTIVE | Noted: 2020-11-12

## 2021-10-19 PROCEDURE — 77066 DX MAMMO INCL CAD BI: CPT

## 2021-10-19 PROCEDURE — G0279 TOMOSYNTHESIS, MAMMO: HCPCS

## 2021-10-21 ENCOUNTER — OFFICE VISIT (OUTPATIENT)
Dept: SURGICAL ONCOLOGY | Facility: CLINIC | Age: 71
End: 2021-10-21
Payer: MEDICARE

## 2021-10-21 VITALS
BODY MASS INDEX: 27.6 KG/M2 | WEIGHT: 150 LBS | SYSTOLIC BLOOD PRESSURE: 158 MMHG | TEMPERATURE: 99.2 F | OXYGEN SATURATION: 97 % | DIASTOLIC BLOOD PRESSURE: 80 MMHG | HEART RATE: 87 BPM | RESPIRATION RATE: 16 BRPM | HEIGHT: 62 IN

## 2021-10-21 DIAGNOSIS — Z08 ENCOUNTER FOR FOLLOW-UP SURVEILLANCE OF DUCTAL CARCINOMA IN SITU OF BREAST: ICD-10-CM

## 2021-10-21 DIAGNOSIS — Z86.000 HISTORY OF DUCTAL CARCINOMA IN SITU (DCIS) OF BREAST: Primary | ICD-10-CM

## 2021-10-21 DIAGNOSIS — Z86.000 ENCOUNTER FOR FOLLOW-UP SURVEILLANCE OF DUCTAL CARCINOMA IN SITU OF BREAST: ICD-10-CM

## 2021-10-21 PROCEDURE — 99213 OFFICE O/P EST LOW 20 MIN: CPT | Performed by: SURGERY

## 2021-11-27 ENCOUNTER — OFFICE VISIT (OUTPATIENT)
Dept: URGENT CARE | Facility: MEDICAL CENTER | Age: 71
End: 2021-11-27
Payer: MEDICARE

## 2021-11-27 VITALS
HEIGHT: 62 IN | BODY MASS INDEX: 27.6 KG/M2 | RESPIRATION RATE: 18 BRPM | HEART RATE: 98 BPM | OXYGEN SATURATION: 97 % | TEMPERATURE: 100.5 F | WEIGHT: 150 LBS

## 2021-11-27 DIAGNOSIS — B34.9 ACUTE VIRAL SYNDROME: Primary | ICD-10-CM

## 2021-11-27 PROCEDURE — U0003 INFECTIOUS AGENT DETECTION BY NUCLEIC ACID (DNA OR RNA); SEVERE ACUTE RESPIRATORY SYNDROME CORONAVIRUS 2 (SARS-COV-2) (CORONAVIRUS DISEASE [COVID-19]), AMPLIFIED PROBE TECHNIQUE, MAKING USE OF HIGH THROUGHPUT TECHNOLOGIES AS DESCRIBED BY CMS-2020-01-R: HCPCS | Performed by: PHYSICIAN ASSISTANT

## 2021-11-27 PROCEDURE — U0005 INFEC AGEN DETEC AMPLI PROBE: HCPCS | Performed by: PHYSICIAN ASSISTANT

## 2021-11-27 PROCEDURE — G0463 HOSPITAL OUTPT CLINIC VISIT: HCPCS | Performed by: PHYSICIAN ASSISTANT

## 2021-11-27 PROCEDURE — 99213 OFFICE O/P EST LOW 20 MIN: CPT | Performed by: PHYSICIAN ASSISTANT

## 2021-11-28 ENCOUNTER — TELEPHONE (OUTPATIENT)
Dept: URGENT CARE | Facility: MEDICAL CENTER | Age: 71
End: 2021-11-28

## 2021-11-28 LAB — SARS-COV-2 RNA RESP QL NAA+PROBE: POSITIVE

## 2021-12-06 DIAGNOSIS — I10 ESSENTIAL HYPERTENSION: ICD-10-CM

## 2021-12-06 RX ORDER — LISINOPRIL AND HYDROCHLOROTHIAZIDE 20; 12.5 MG/1; MG/1
1 TABLET ORAL DAILY
Qty: 90 TABLET | Refills: 1 | Status: SHIPPED | OUTPATIENT
Start: 2021-12-06

## 2021-12-16 ENCOUNTER — CLINICAL SUPPORT (OUTPATIENT)
Dept: RADIATION ONCOLOGY | Facility: HOSPITAL | Age: 71
End: 2021-12-16
Attending: RADIOLOGY
Payer: MEDICARE

## 2021-12-16 VITALS
TEMPERATURE: 98.4 F | HEART RATE: 88 BPM | WEIGHT: 149 LBS | RESPIRATION RATE: 18 BRPM | BODY MASS INDEX: 27.42 KG/M2 | SYSTOLIC BLOOD PRESSURE: 144 MMHG | HEIGHT: 62 IN | OXYGEN SATURATION: 98 % | DIASTOLIC BLOOD PRESSURE: 78 MMHG

## 2021-12-16 DIAGNOSIS — Z86.000 HISTORY OF DUCTAL CARCINOMA IN SITU (DCIS) OF BREAST: Primary | ICD-10-CM

## 2021-12-16 PROCEDURE — 99211 OFF/OP EST MAY X REQ PHY/QHP: CPT | Performed by: RADIOLOGY

## 2021-12-16 PROCEDURE — 99213 OFFICE O/P EST LOW 20 MIN: CPT | Performed by: RADIOLOGY

## 2022-04-11 ENCOUNTER — HOSPITAL ENCOUNTER (OUTPATIENT)
Dept: RADIOLOGY | Age: 72
Discharge: HOME | End: 2022-04-11
Payer: MEDICARE

## 2022-04-11 DIAGNOSIS — M85.89 OSTEOPENIA OF MULTIPLE SITES: ICD-10-CM

## 2022-04-11 PROCEDURE — 77080 DXA BONE DENSITY AXIAL: CPT

## 2022-04-20 ENCOUNTER — TELEPHONE (OUTPATIENT)
Dept: SURGICAL ONCOLOGY | Facility: CLINIC | Age: 72
End: 2022-04-20

## 2022-04-20 NOTE — TELEPHONE ENCOUNTER
Called and spoke with Guero Handy to reschedule her appt with Mckenna Saavedra due to Lalit Posada having a meeting on 4/27/22  Offered her appt at the same with Kody Ortiz instead and she agreed

## 2022-04-27 ENCOUNTER — TELEPHONE (OUTPATIENT)
Dept: SURGICAL ONCOLOGY | Facility: CLINIC | Age: 72
End: 2022-04-27

## 2022-04-27 NOTE — TELEPHONE ENCOUNTER
Spoke with Pt to see if she wanted to come in today to see Mary Ellen Stone  She canceled her appointment for today and rescheduled with [facility] for May 18

## 2022-05-18 ENCOUNTER — OFFICE VISIT (OUTPATIENT)
Dept: SURGICAL ONCOLOGY | Facility: CLINIC | Age: 72
End: 2022-05-18
Payer: MEDICARE

## 2022-05-18 VITALS
TEMPERATURE: 97.9 F | SYSTOLIC BLOOD PRESSURE: 148 MMHG | RESPIRATION RATE: 16 BRPM | HEART RATE: 76 BPM | OXYGEN SATURATION: 98 % | WEIGHT: 145 LBS | HEIGHT: 61 IN | DIASTOLIC BLOOD PRESSURE: 80 MMHG | BODY MASS INDEX: 27.38 KG/M2

## 2022-05-18 DIAGNOSIS — Z86.000 HISTORY OF DUCTAL CARCINOMA IN SITU (DCIS) OF BREAST: Primary | ICD-10-CM

## 2022-05-18 PROCEDURE — 99213 OFFICE O/P EST LOW 20 MIN: CPT

## 2022-05-18 RX ORDER — CARVEDILOL 3.12 MG/1
3.12 TABLET ORAL 2 TIMES DAILY WITH MEALS
COMMUNITY

## 2022-10-20 ENCOUNTER — APPOINTMENT (EMERGENCY)
Dept: CT IMAGING | Facility: HOSPITAL | Age: 72
End: 2022-10-20
Payer: MEDICARE

## 2022-10-20 ENCOUNTER — APPOINTMENT (EMERGENCY)
Dept: RADIOLOGY | Facility: HOSPITAL | Age: 72
End: 2022-10-20
Payer: MEDICARE

## 2022-10-20 ENCOUNTER — HOSPITAL ENCOUNTER (EMERGENCY)
Facility: HOSPITAL | Age: 72
Discharge: HOME | End: 2022-10-21
Attending: EMERGENCY MEDICINE
Payer: MEDICARE

## 2022-10-20 ENCOUNTER — HOSPITAL ENCOUNTER (OUTPATIENT)
Dept: MAMMOGRAPHY | Facility: CLINIC | Age: 72
Discharge: HOME | End: 2022-10-20
Payer: MEDICARE

## 2022-10-20 VITALS — BODY MASS INDEX: 27.06 KG/M2 | HEIGHT: 61 IN | WEIGHT: 143.3 LBS

## 2022-10-20 DIAGNOSIS — I10 HYPERTENSION: ICD-10-CM

## 2022-10-20 DIAGNOSIS — M54.2 NECK PAIN: Primary | ICD-10-CM

## 2022-10-20 DIAGNOSIS — R07.9 CHEST PAIN: ICD-10-CM

## 2022-10-20 DIAGNOSIS — Z86.000 HISTORY OF DUCTAL CARCINOMA IN SITU (DCIS) OF BREAST: ICD-10-CM

## 2022-10-20 LAB
ALBUMIN SERPL BCP-MCNC: 4.2 G/DL (ref 3.5–5)
ALP SERPL-CCNC: 63 U/L (ref 34–104)
ALT SERPL W P-5'-P-CCNC: 14 U/L (ref 7–52)
ANION GAP SERPL CALCULATED.3IONS-SCNC: 9 MMOL/L (ref 4–13)
AST SERPL W P-5'-P-CCNC: 14 U/L (ref 13–39)
BASOPHILS # BLD AUTO: 0.07 THOUSANDS/ÂΜL (ref 0–0.1)
BASOPHILS NFR BLD AUTO: 1 % (ref 0–1)
BILIRUB SERPL-MCNC: 0.5 MG/DL (ref 0.2–1)
BUN SERPL-MCNC: 14 MG/DL (ref 5–25)
CALCIUM SERPL-MCNC: 9.6 MG/DL (ref 8.4–10.2)
CARDIAC TROPONIN I PNL SERPL HS: 5 NG/L
CHLORIDE SERPL-SCNC: 101 MMOL/L (ref 96–108)
CK SERPL-CCNC: 36 U/L (ref 26–192)
CO2 SERPL-SCNC: 27 MMOL/L (ref 21–32)
CREAT SERPL-MCNC: 0.79 MG/DL (ref 0.6–1.3)
EOSINOPHIL # BLD AUTO: 0.35 THOUSAND/ÂΜL (ref 0–0.61)
EOSINOPHIL NFR BLD AUTO: 4 % (ref 0–6)
ERYTHROCYTE [DISTWIDTH] IN BLOOD BY AUTOMATED COUNT: 13 % (ref 11.6–15.1)
GFR SERPL CREATININE-BSD FRML MDRD: 73 ML/MIN/1.73SQ M
GLUCOSE SERPL-MCNC: 103 MG/DL (ref 65–140)
HCT VFR BLD AUTO: 41.3 % (ref 34.8–46.1)
HGB BLD-MCNC: 14.3 G/DL (ref 11.5–15.4)
IMM GRANULOCYTES # BLD AUTO: 0.07 THOUSAND/UL (ref 0–0.2)
IMM GRANULOCYTES NFR BLD AUTO: 1 % (ref 0–2)
LIPASE SERPL-CCNC: 36 U/L (ref 11–82)
LYMPHOCYTES # BLD AUTO: 1.71 THOUSANDS/ÂΜL (ref 0.6–4.47)
LYMPHOCYTES NFR BLD AUTO: 22 % (ref 14–44)
MCH RBC QN AUTO: 31.9 PG (ref 26.8–34.3)
MCHC RBC AUTO-ENTMCNC: 34.6 G/DL (ref 31.4–37.4)
MCV RBC AUTO: 92 FL (ref 82–98)
MONOCYTES # BLD AUTO: 0.83 THOUSAND/ÂΜL (ref 0.17–1.22)
MONOCYTES NFR BLD AUTO: 10 % (ref 4–12)
NEUTROPHILS # BLD AUTO: 4.93 THOUSANDS/ÂΜL (ref 1.85–7.62)
NEUTS SEG NFR BLD AUTO: 62 % (ref 43–75)
NRBC BLD AUTO-RTO: 0 /100 WBCS
PLATELET # BLD AUTO: 432 THOUSANDS/UL (ref 149–390)
PMV BLD AUTO: 9 FL (ref 8.9–12.7)
POTASSIUM SERPL-SCNC: 3.5 MMOL/L (ref 3.5–5.3)
PROT SERPL-MCNC: 7.4 G/DL (ref 6.4–8.4)
RBC # BLD AUTO: 4.48 MILLION/UL (ref 3.81–5.12)
SODIUM SERPL-SCNC: 137 MMOL/L (ref 135–147)
WBC # BLD AUTO: 7.96 THOUSAND/UL (ref 4.31–10.16)

## 2022-10-20 PROCEDURE — G0279 TOMOSYNTHESIS, MAMMO: HCPCS

## 2022-10-20 PROCEDURE — 99285 EMERGENCY DEPT VISIT HI MDM: CPT | Performed by: EMERGENCY MEDICINE

## 2022-10-20 PROCEDURE — 93005 ELECTROCARDIOGRAM TRACING: CPT

## 2022-10-20 PROCEDURE — 99284 EMERGENCY DEPT VISIT MOD MDM: CPT

## 2022-10-20 PROCEDURE — 72125 CT NECK SPINE W/O DYE: CPT

## 2022-10-20 PROCEDURE — 85025 COMPLETE CBC W/AUTO DIFF WBC: CPT | Performed by: EMERGENCY MEDICINE

## 2022-10-20 PROCEDURE — 96374 THER/PROPH/DIAG INJ IV PUSH: CPT

## 2022-10-20 PROCEDURE — 80053 COMPREHEN METABOLIC PANEL: CPT | Performed by: EMERGENCY MEDICINE

## 2022-10-20 PROCEDURE — 83690 ASSAY OF LIPASE: CPT | Performed by: EMERGENCY MEDICINE

## 2022-10-20 PROCEDURE — 71045 X-RAY EXAM CHEST 1 VIEW: CPT

## 2022-10-20 PROCEDURE — 36415 COLL VENOUS BLD VENIPUNCTURE: CPT | Performed by: EMERGENCY MEDICINE

## 2022-10-20 PROCEDURE — 82550 ASSAY OF CK (CPK): CPT | Performed by: EMERGENCY MEDICINE

## 2022-10-20 PROCEDURE — 77066 DX MAMMO INCL CAD BI: CPT

## 2022-10-20 PROCEDURE — G1004 CDSM NDSC: HCPCS

## 2022-10-20 PROCEDURE — 70450 CT HEAD/BRAIN W/O DYE: CPT

## 2022-10-20 PROCEDURE — 84484 ASSAY OF TROPONIN QUANT: CPT | Performed by: EMERGENCY MEDICINE

## 2022-10-20 RX ORDER — LABETALOL HYDROCHLORIDE 5 MG/ML
10 INJECTION, SOLUTION INTRAVENOUS ONCE
Status: COMPLETED | OUTPATIENT
Start: 2022-10-20 | End: 2022-10-20

## 2022-10-20 RX ADMIN — LABETALOL HYDROCHLORIDE 10 MG: 5 INJECTION, SOLUTION INTRAVENOUS at 22:16

## 2022-10-21 ENCOUNTER — TELEPHONE (OUTPATIENT)
Dept: PHYSICAL THERAPY | Facility: OTHER | Age: 72
End: 2022-10-21

## 2022-10-21 VITALS
OXYGEN SATURATION: 97 % | TEMPERATURE: 98.4 F | HEART RATE: 73 BPM | DIASTOLIC BLOOD PRESSURE: 70 MMHG | SYSTOLIC BLOOD PRESSURE: 154 MMHG | RESPIRATION RATE: 18 BRPM

## 2022-10-21 LAB
2HR DELTA HS TROPONIN: -1 NG/L
CARDIAC TROPONIN I PNL SERPL HS: 4 NG/L

## 2022-10-21 PROCEDURE — 93005 ELECTROCARDIOGRAM TRACING: CPT

## 2022-10-21 PROCEDURE — 84484 ASSAY OF TROPONIN QUANT: CPT | Performed by: EMERGENCY MEDICINE

## 2022-10-21 PROCEDURE — 36415 COLL VENOUS BLD VENIPUNCTURE: CPT | Performed by: EMERGENCY MEDICINE

## 2022-10-21 RX ORDER — OXYCODONE HYDROCHLORIDE AND ACETAMINOPHEN 5; 325 MG/1; MG/1
1 TABLET ORAL EVERY 4 HOURS PRN
Qty: 20 TABLET | Refills: 0 | Status: SHIPPED | OUTPATIENT
Start: 2022-10-21 | End: 2022-10-25

## 2022-10-21 RX ORDER — OXYCODONE HYDROCHLORIDE AND ACETAMINOPHEN 5; 325 MG/1; MG/1
1 TABLET ORAL ONCE
Status: COMPLETED | OUTPATIENT
Start: 2022-10-21 | End: 2022-10-21

## 2022-10-21 RX ORDER — PRAVASTATIN SODIUM 10 MG
40 TABLET ORAL DAILY
COMMUNITY

## 2022-10-21 RX ADMIN — OXYCODONE HYDROCHLORIDE AND ACETAMINOPHEN 1 TABLET: 5; 325 TABLET ORAL at 00:15

## 2022-10-21 NOTE — ED PROVIDER NOTES
History  Chief Complaint   Patient presents with   • High Blood Pressure     Pt c/o of high BP and neck pain  Pt states her blood pressure at home was 194/113 despite taking her BP medications at home  No other complaints at this time      Patient is a 74year old female with 1 week of intermittent headache and neck pain and chest pain  BP has been high at home  Was on steroids for neck pain which was stopped  No trauma  No fever  No weakness or dizziness or numbness  No urinary sx  No sob  Has been taking tylenol and muscle relaxer for pain without relief  Did not drive here  Declines pain medication at this time  Was last seen at [address] [place] [address] ED on 10/18/22 for neck pain and had EKG and labs  [facility] and last Rx filled was on 20 for percocet for 2 day supply  History provided by:  Patient (family)   used: No        Prior to Admission Medications   Prescriptions Last Dose Informant Patient Reported? Taking? Acetaminophen (TYLENOL 8 HOUR PO) 10/20/2022 at Unknown time Self Yes Yes   Sig: Take by mouth as needed     calcium citrate-vitamin D (CITRACAL+D) 315-200 MG-UNIT per tablet Not Taking at Unknown time Self Yes No   Sig: Take 1 tablet by mouth daily   Patient not taking: Reported on 10/21/2022   carvedilol (COREG) 3 125 mg tablet 10/20/2022 at Unknown time  Yes Yes   Sig: Take 3 125 mg by mouth in the morning and 3 125 mg in the evening  Take with meals     famotidine (PEPCID) 20 mg tablet 10/20/2022 at Unknown time  No Yes   Sig: Take 1 tablet (20 mg total) by mouth 2 (two) times a day   fluticasone (FLONASE) 50 mcg/act nasal spray Past Month at Unknown time Self Yes Yes   Si spray into each nostril as needed    ibuprofen (MOTRIN) 200 mg tablet 10/20/2022 at Unknown time Self Yes Yes   Sig: Take by mouth every 6 (six) hours as needed for mild pain   lisinopril-hydrochlorothiazide (PRINZIDE,ZESTORETIC) 20-12 5 MG per tablet 10/20/2022 at Unknown time  No Yes   Sig: Take 1 tablet by mouth daily   loratadine (CLARITIN) 10 mg tablet 10/20/2022 at Unknown time Self Yes Yes   Sig: Take 1 tablet by mouth daily as needed   metoprolol succinate (TOPROL-XL) 50 mg 24 hr tablet  Self No No   Sig: Take 1 tablet (50 mg total) by mouth daily   multivitamin-minerals (CENTRUM) tablet 10/20/2022 at Unknown time Self Yes Yes   Sig: Take 1 tablet by mouth daily   pravastatin (PRAVACHOL) 10 mg tablet  Self Yes Yes   Sig: Take 40 mg by mouth daily   rosuvastatin (CRESTOR) 20 MG tablet Not Taking at Unknown time  No No   Sig: Take 1 tablet (20 mg total) by mouth daily   Patient not taking: Reported on 10/21/2022      Facility-Administered Medications: None       Past Medical History:   Diagnosis Date   • Basal cell carcinoma 2015    Right wrist   • Basal cell carcinoma 09/2020    Right eye/cheek   • Breast cancer ([facility] [address] ) 12/23/2020    left   • Cancer (HCC)     BCC   • Fibrocystic breast disease    • GERD (gastroesophageal reflux disease)    • History of radiation therapy 2021    Left breast WBRT   • Hypercholesterolemia    • Hypertension        Past Surgical History:   Procedure Laterality Date   • BREAST BIOPSY Left 10/23/2020    stereo bx- dcis   • BREAST EXCISIONAL BIOPSY Right     neg- pt does not know when   • BREAST LUMPECTOMY Left 12/23/2020    Procedure: BREAST NEEDLE LOCALIZED LUMPECTOMY (NEEDLE LOC AT 0700);   Surgeon: Ciro Villaseñor MD;  Location: [facility];  Service: Surgical Oncology   • COLONOSCOPY     • MAMMO NEEDLE LOCALIZATION LEFT (ALL INC) Left 12/23/2020   • MAMMO STEREOTACTIC BREAST BIOPSY LEFT (ALL INC) Left 10/23/2020       Family History   Problem Relation Age of Onset   • Hypertension Mother    • Thyroid disease Mother    • Diabetes Brother    • Kidney failure Brother    • No Known Problems Father    • No Known Problems Daughter    • No Known Problems Maternal Grandmother    • No Known Problems Maternal Grandfather    • No Known Problems Paternal Grandmother • No Known Problems Paternal Grandfather    • No Known Problems Daughter    • No Known Problems Maternal Aunt    • No Known Problems Maternal Aunt    • No Known Problems Maternal Aunt    • No Known Problems Maternal Aunt    • No Known Problems Son    • No Known Problems Maternal Uncle    • No Known Problems Paternal Uncle    • No Known Problems Brother    • No Known Problems Maternal Uncle    • No Known Problems Paternal Uncle      I have reviewed and agree with the history as documented  E-Cigarette/Vaping   • E-Cigarette Use Never User      E-Cigarette/Vaping Substances   • Nicotine No    • THC No    • CBD No    • Flavoring No    • Other No    • Unknown No      Social History     Tobacco Use   • Smoking status: Never Smoker   • Smokeless tobacco: Never Used   Vaping Use   • Vaping Use: Never used   Substance Use Topics   • Alcohol use: No   • Drug use: No       Review of Systems   Constitutional: Negative for fever  Respiratory: Negative for shortness of breath  Cardiovascular: Positive for chest pain (intermittent)  Gastrointestinal: Negative for nausea and vomiting  Musculoskeletal: Positive for neck pain (intermittent)  Neurological: Positive for headaches (intermittent)  Negative for dizziness, weakness and numbness  All other systems reviewed and are negative  Physical Exam  Physical Exam  Vitals and nursing note reviewed  Constitutional:       General: She is in acute distress (moderate)  HENT:      Head: Normocephalic and atraumatic  Mouth/Throat:      Mouth: Mucous membranes are moist    Eyes:      General: No scleral icterus  Cardiovascular:      Rate and Rhythm: Normal rate and regular rhythm  Heart sounds: Normal heart sounds  No murmur heard  Pulmonary:      Effort: Pulmonary effort is normal  No respiratory distress  Breath sounds: Normal breath sounds  No stridor  No wheezing, rhonchi or rales  Chest:      Chest wall: No tenderness     Abdominal: General: Bowel sounds are normal       Palpations: Abdomen is soft  Tenderness: There is no abdominal tenderness  Musculoskeletal:         General: No deformity  Cervical back: Normal range of motion and neck supple  No rigidity or tenderness  Right lower leg: Edema present  Left lower leg: Edema present  Skin:     General: Skin is warm and dry  Findings: No erythema or rash  Neurological:      General: No focal deficit present  Mental Status: She is alert and oriented to person, place, and time  Sensory: No sensory deficit  Motor: No weakness     Psychiatric:         Mood and Affect: Mood normal          Vital Signs  ED Triage Vitals   Temperature Pulse Respirations Blood Pressure SpO2   10/20/22 2137 10/20/22 2135 10/20/22 2135 10/20/22 2135 10/20/22 2135   98 4 °F (36 9 °C) 97 20 (!) 185/84 97 %      Temp Source Heart Rate Source Patient Position - Orthostatic VS BP Location FiO2 (%)   10/20/22 2137 10/20/22 2135 10/20/22 2135 10/20/22 2135 --   Oral Monitor Lying Right arm       Pain Score       10/20/22 2135       8           Vitals:    10/20/22 2200 10/20/22 2230 10/21/22 0008 10/21/22 0100   BP: (!) 196/79 (!) 187/91 (!) 180/88 154/70   Pulse: 81 73 77 73   Patient Position - Orthostatic VS:             Visual Acuity      ED Medications  Medications   labetalol (NORMODYNE) injection 10 mg (10 mg Intravenous Given 10/20/22 2216)   oxyCODONE-acetaminophen (PERCOCET) 5-325 mg per tablet 1 tablet (1 tablet Oral Given 10/21/22 0015)       Diagnostic Studies  Results Reviewed     Procedure Component Value Units Date/Time    HS Troponin I 2hr [[id number]]  (Normal) Collected: 10/21/22 0011    Lab Status: Final result Specimen: Blood from Arm, Right Updated: 10/21/22 0056     hs TnI 2hr 4 ng/L      Delta 2hr hsTnI -1 ng/L     HS Troponin 0hr (reflex protocol) [[id number]]  (Normal) Collected: 10/20/22 2213    Lab Status: Final result Specimen: Blood from Arm, Right Updated: 10/20/22 2248     hs TnI 0hr 5 ng/L     Lipase [[id number]]  (Normal) Collected: 10/20/22 2213    Lab Status: Final result Specimen: Blood from Arm, Right Updated: 10/20/22 2244     Lipase 36 u/L     CK Total with Reflex CKMB [[id number]]  (Normal) Collected: 10/20/22 2213    Lab Status: Final result Specimen: Blood from Arm, Right Updated: 10/20/22 2244     Total CK 36 U/L     Comprehensive metabolic panel [[id number]] Collected: 10/20/22 2213    Lab Status: Final result Specimen: Blood from Arm, Right Updated: 10/20/22 2244     Sodium 137 mmol/L      Potassium 3 5 mmol/L      Chloride 101 mmol/L      CO2 27 mmol/L      ANION GAP 9 mmol/L      BUN 14 mg/dL      Creatinine 0 79 mg/dL      Glucose 103 mg/dL      Calcium 9 6 mg/dL      AST 14 U/L      ALT 14 U/L      Alkaline Phosphatase 63 U/L      Total Protein 7 4 g/dL      Albumin 4 2 g/dL      Total Bilirubin 0 50 mg/dL      eGFR 73 ml/min/1 73sq m     Narrative:      [facility] guidelines for Chronic Kidney Disease (CKD):   •  Stage 1 with normal or high GFR (GFR > 90 mL/min/1 73 square meters)  •  Stage 2 Mild CKD (GFR = 60-89 mL/min/1 73 square meters)  •  Stage 3A Moderate CKD (GFR = 45-59 mL/min/1 73 square meters)  •  Stage 3B Moderate CKD (GFR = 30-44 mL/min/1 73 square meters)  •  Stage 4 Severe CKD (GFR = 15-29 mL/min/1 73 square meters)  •  Stage 5 End Stage CKD (GFR <15 mL/min/1 73 square meters)  Note: GFR calculation is accurate only with a steady state creatinine    CBC and differential [[id number]]  (Abnormal) Collected: 10/20/22 2213    Lab Status: Final result Specimen: Blood from Arm, Right Updated: 10/20/22 2224     WBC 7 96 Thousand/uL      RBC 4 48 Million/uL      Hemoglobin 14 3 g/dL      Hematocrit 41 3 %      MCV 92 fL      MCH 31 9 pg      MCHC 34 6 g/dL      RDW 13 0 %      MPV 9 0 fL      Platelets 961 Thousands/uL      nRBC 0 /100 WBCs      Neutrophils Relative 62 %      Immat GRANS % 1 %      Lymphocytes Relative 22 %      Monocytes Relative 10 %      Eosinophils Relative 4 %      Basophils Relative 1 %      Neutrophils Absolute 4 93 Thousands/µL      Immature Grans Absolute 0 07 Thousand/uL      Lymphocytes Absolute 1 71 Thousands/µL      Monocytes Absolute 0 83 Thousand/µL      Eosinophils Absolute 0 35 Thousand/µL      Basophils Absolute 0 07 Thousands/µL                  XR chest 1 view portable   ED Interpretation by Tiarra Hua MD (10/20 4879)   No acute disease read by me  CT head without contrast   ED Interpretation by Tiarra Hua MD (10/20 1066)   FINDINGS:     PARENCHYMA:  No intracranial mass, mass effect or midline shift  No CT signs of acute infarction  No acute parenchymal hemorrhage      VENTRICLES AND EXTRA-AXIAL SPACES:  Normal for the patient's age      VISUALIZED ORBITS AND PARANASAL SINUSES:  Unremarkable      CALVARIUM AND EXTRACRANIAL SOFT TISSUES:  Normal      IMPRESSION:     No acute intracranial abnormality                  Workstation performed: [id number]      Final Result by Mona Rodríguez MD (10/20 6276)      No acute intracranial abnormality  Workstation performed: [id number]         CT cervical spine without contrast   ED Interpretation by Tiarra Hua MD (10/20 8652)   FINDINGS:     ALIGNMENT:  There is 2 to 3 mm of degenerative retrolisthesis of C4 upon C5  No subluxation  No jumped facets      VERTEBRAL BODIES:  No acute fracture      DEGENERATIVE CHANGES:  Mild to moderate multilevel degenerative changes are present  Disc space narrowing is most pronounced at C4-5, followed by C3-4  There is multilevel facet arthropathy      PREVERTEBRAL AND PARASPINAL SOFT TISSUES: There is a 1 cm nodule within the left lobe of the thyroid    Incidental discovery of one or more thyroid nodule(s) measuring less than 1 5 cm is noted in this patient who is above 28years old; according to   guidelines published in the February 2015 white paper on incidental thyroid nodules in the Journal of the [employer] of Radiology [facility]), no further evaluation is recommended       THORACIC INLET:  Mild biapical scarring      IMPRESSION:     No acute cervical spine fracture or traumatic malalignment  Mild to moderate multilevel degenerative changes are present      The   re is a 1 cm nodule within the left lobe of the thyroid  Please see above discussion/recommendations                  Workstation performed: [id number]      Final Result by Vikki Mcclendon MD (10/20 2338)      No acute cervical spine fracture or traumatic malalignment  Mild to moderate multilevel degenerative changes are present  There is a 1 cm nodule within the left lobe of the thyroid  Please see above discussion/recommendations                     Workstation performed: [id number]                    Procedures  ECG 12 Lead Documentation Only    Date/Time: 10/20/2022 10:08 PM  Performed by: Nichol Quan MD  Authorized by: Nichol Quan MD     Indications / Diagnosis:  Chest pain, HTN  ECG reviewed by me, the ED Provider: yes    Patient location:  ED  Previous ECG:     Previous ECG:  Compared to current    Comparison ECG info:  11/19/20    Similarity:  Changes noted (No Q wave in V2 now)  Rate:     ECG rate:  81    ECG rate assessment: normal    Rhythm:     Rhythm: sinus rhythm    Ectopy:     Ectopy: none    QRS:     QRS axis:  Left  Conduction:     Conduction: normal    ST segments:     ST segments:  Normal  T waves:     T waves: normal      ECG 12 Lead Documentation Only    Date/Time: 10/21/2022 12:27 AM  Performed by: Nichol Quan MD  Authorized by: Nichol Quan MD     Indications / Diagnosis:  Repeat EKG #2 for HTN, neck pain  ECG reviewed by me, the ED Provider: yes    Patient location:  ED  Previous ECG:     Previous ECG:  Compared to current    Comparison ECG info:  Earlier tonight    Similarity:  Changes noted (artifact now)  Quality:     Tracing quality:  Limited by artifact  Rate:     ECG rate:  73    ECG rate assessment: normal    Rhythm:     Rhythm: sinus rhythm    Ectopy:     Ectopy: none    QRS:     QRS axis:  Left  Conduction:     Conduction: normal    ST segments:     ST segments:  Normal  T waves:     T waves: normal               ED Course  ED Course as of 10/21/22 0120   Thu Oct 20, 2022   2218 EKG d/w patient and family with patient's permission  2308 Labs and CXR d/w patient and family  Fri Oct 21, 2022   0011 CTs d/w patient and family  Percocet ordered for pain  Awaiting repeat troponin and if negative will discharge home with narcotic pain Rx and referral to spine and pain  0113 Repeat troponin and EKG normal and d/w patient and family and repeat /70  HEART Risk Score    Flowsheet Row Most Recent Value   Heart Score Risk Calculator    History 0 Filed at: 10/20/2022 2249   ECG 0 Filed at: 10/20/2022 2249   Age 2 Filed at: 10/20/2022 2249   Risk Factors 1 Filed at: 10/20/2022 2249   Troponin 0 Filed at: 10/20/2022 2249   HEART Score 3 Filed at: 10/20/2022 2249                                      MDM  Number of Diagnoses or Management Options  Diagnosis management comments: DDX including but not limited to: ACS, MI, doubt PE; PTX, pneumonia; doubt dissection; pleurisy, pericarditis, myocarditis, rhabdomyolysis, GI etiology  DDx including but not limited to: strain, sprain, arthritis, spinal stenosis, torticollis, herniated disc, radiculopathy; doubt epidural abscess or fracture or meningitis or carotid dissection  Differential diagnosis including but not limited to: hypertension, hypertensive urgency, hypertensive crisis, malignant hypertension, end organ damage, renal failure, metabolic abnormality, intracranial process, adverse reaction to steroids; doubt pheochromocytoma          Amount and/or Complexity of Data Reviewed  Clinical lab tests: ordered and reviewed  Tests in the radiology section of CPT®: ordered and reviewed  Decide to obtain previous medical records or to obtain history from someone other than the patient: yes  Obtain history from someone other than the patient: yes  Review and summarize past medical records: yes  Independent visualization of images, tracings, or specimens: yes        Disposition  Final diagnoses:   Neck pain   Hypertension   Chest pain     Time reflects when diagnosis was documented in both MDM as applicable and the Disposition within this note     Time User Action Codes Description Comment    10/21/2022  1:16 AM Franchester Chiu Add [M54 2] Neck pain     10/21/2022  1:16 AM Franchester Chiu Add [I10] Hypertension     10/21/2022  1:18 AM Franchester Chiu Add [R07 9] Chest pain       ED Disposition     ED Disposition   Discharge    Condition   Stable    Date/Time   Fri Oct 21, 2022  1:16 AM    Comment   Terrell Burt discharge to home/self care  Follow-up Information     Follow up With Specialties Details Why Contact Info Additional 7[address]  [facility], DO Internal Medicine Call today No driving with percocet and do not use acetaminophen with percocet  Return sooner if increased pain, numbness, weakness, fever, vomiting, difficulty breathing or urinating, incontinence   [address] [facility] Pain Medicine Call today  [address] [id number]  [address], [address] [phone]          Patient's Medications   Discharge Prescriptions    OXYCODONE-ACETAMINOPHEN (PERCOCET) 5-325 MG PER TABLET    Take 1 tablet by mouth every 4 (four) hours as needed for moderate pain for up to 4 days Max Daily Amount: 6 tablets       Start Date: 10/21/2022End Date: 10/25/2022       Order Dose: 1 tablet       Quantity: 20 tablet    Refills: 0           PDMP Review       Value Time User PDMP Reviewed  Yes 10/20/2022 10:01 PM Nathalie Us MD          ED Provider  Electronically Signed by           Nathalie Us MD  10/21/22 0121

## 2022-10-22 LAB
ATRIAL RATE: 73 BPM
ATRIAL RATE: 81 BPM
P AXIS: 43 DEGREES
P AXIS: 50 DEGREES
PR INTERVAL: 164 MS
PR INTERVAL: 170 MS
QRS AXIS: -2 DEGREES
QRS AXIS: -7 DEGREES
QRSD INTERVAL: 72 MS
QRSD INTERVAL: 72 MS
QT INTERVAL: 366 MS
QT INTERVAL: 380 MS
QTC INTERVAL: 418 MS
QTC INTERVAL: 425 MS
T WAVE AXIS: 13 DEGREES
T WAVE AXIS: 5 DEGREES
VENTRICULAR RATE: 73 BPM
VENTRICULAR RATE: 81 BPM

## 2022-10-22 PROCEDURE — 93010 ELECTROCARDIOGRAM REPORT: CPT | Performed by: INTERNAL MEDICINE

## 2022-10-24 ENCOUNTER — NURSE TRIAGE (OUTPATIENT)
Dept: PHYSICAL THERAPY | Facility: OTHER | Age: 72
End: 2022-10-24

## 2022-10-24 DIAGNOSIS — M54.2 ACUTE NECK PAIN: Primary | ICD-10-CM

## 2022-10-24 NOTE — TELEPHONE ENCOUNTER
Additional Information  • Negative: Is this related to a work injury? • Negative: Is this related to an MVA? • Negative: Are you currently recieving homecare services? Background - Initial Assessment  Clinical complaint: Pain is left side neck, and left shoulder  Also radiates up into head  No numbness or tingling  Left hand does feel cool to the touch  Started 10/10/22  [facility] Went to ED 10/20/22  Patient states she is feeling a bit better but still has tightness in her neck     Date of onset: 10/10/22  Frequency of pain: intermittent  Quality of pain: throbbing    Protocols used: SL AMB COMPREHENSIVE SPINE PROGRAM PROTOCOL

## 2022-10-24 NOTE — TELEPHONE ENCOUNTER
Additional Information  • Negative: Has the patient had unexplained weight loss? • Negative: Does the patient have a fever? • Negative: Is the patient experiencing blood in sputum? • Negative: Is the patient experiencing urine retention? • Negative: Is the patient experiencing acute drop foot or paralysis? • Negative: Has the patient experienced major trauma? (fall from height, high speed collision, direct blow to spine) and is also experiencing nausea, light-headedness, or loss of consciousness? • Negative: Is this a chronic condition? Protocols used: SL AMB COMPREHENSIVE SPINE PROGRAM PROTOCOL    This RN did review in detail the Comprehensive Spine Program and what we can provide for their back pain  Patient is agreeable to being triaged by this RN and would like to proceed with Physical Therapy  Referral was placed for Physical Therapy at the [place] site  Patients information was sent to the  to make evaluation appointment  Patient made aware that the PT office  will be calling to schedule the appointment  Patient was provided with the phone number to the PT office  No further questions and/or concerns were voiced by the patient at this time  Patient states understanding of the referral that was placed  Referral Closed

## 2022-10-28 ENCOUNTER — TELEPHONE (OUTPATIENT)
Dept: PAIN MEDICINE | Facility: MEDICAL CENTER | Age: 72
End: 2022-10-28

## 2022-11-16 ENCOUNTER — TELEPHONE (OUTPATIENT)
Dept: HEMATOLOGY ONCOLOGY | Facility: CLINIC | Age: 72
End: 2022-11-16

## 2022-11-17 ENCOUNTER — TELEPHONE (OUTPATIENT)
Dept: HEMATOLOGY ONCOLOGY | Facility: CLINIC | Age: 72
End: 2022-11-17

## 2022-11-17 NOTE — TELEPHONE ENCOUNTER
Appointment Cancellation Or Reschedule     Person calling In self   If other than patient calling, are they listed on the communication consent form? self   Provider [id number] [place]   Office Visit Date and Time 11/21 1PM   Office Visit Location [facility]   Did patient want to reschedule their office appointment? If so, when was it scheduled to? Yes, 1/25 3:30PM   Did you have STAR scheduled for this appointment? no   Do you need STAR set up for your new appointment? If yes, please send to "PATIENT RIDESHARE" pool for STAR rescheduling no   If you are cancelling appointment, can we notify STAR to cancel ride? If yes, please send to "PATIENT RIDESHARE" pool for STAR to cancel service no   Is this patient calling to reschedule an infusion appointment? no   When is their next infusion appointment? no   Is this patient a Chemo patient? no   Reason for Cancellation or Reschedule Provider not avail     If the patient is a treatment patient, please route this to the office nurse  If the patient is not on treatment, please route to the office MA  If the patient is a surgical oncology patient, please route to surg/onc clinical pool

## 2023-01-19 ENCOUNTER — CLINICAL SUPPORT (OUTPATIENT)
Dept: RADIATION ONCOLOGY | Facility: HOSPITAL | Age: 73
End: 2023-01-19
Attending: RADIOLOGY

## 2023-01-19 ENCOUNTER — RADIATION ONCOLOGY FOLLOW-UP (OUTPATIENT)
Dept: RADIATION ONCOLOGY | Facility: HOSPITAL | Age: 73
End: 2023-01-19
Attending: RADIOLOGY

## 2023-01-19 VITALS
TEMPERATURE: 98.6 F | SYSTOLIC BLOOD PRESSURE: 164 MMHG | HEIGHT: 61 IN | BODY MASS INDEX: 27.08 KG/M2 | OXYGEN SATURATION: 99 % | DIASTOLIC BLOOD PRESSURE: 86 MMHG | HEART RATE: 75 BPM

## 2023-01-19 DIAGNOSIS — Z86.000 HISTORY OF DUCTAL CARCINOMA IN SITU (DCIS) OF BREAST: Primary | ICD-10-CM

## 2023-01-19 NOTE — PROGRESS NOTES
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  Her tumor is ERPR positive  She status post breast conservation surgery  She has a positive posterior margin at the chest wall  She completed a course of adjuvant radiation therapy to the left breast on 4/20/21  Dr James De La Garza did not recommend adjuvant hormonal therapy  The pt was last seen in radiation on 12/16/21  She returns today for her follow up      04/11/22 - DXA bone density spine hip and pelvis  IMPRESSION:  1  Low bone mass (osteopenia)  2   Since a DXA study from 5/31/2019, there has been:  A  STATISTICALLY SIGNIFICANT INCREASE in bone mineral density of  0 027 g/cm2 (3 3)% in the lumbar spine  This may be artifactual, in part, due to progression of spondylosis since the last DXA study  A  STATISTICALLY SIGNIFICANT INCREASE in bone mineral density of  0 047 g/cm2 (6 2)% in the left total hip  3   The 10 year risk of hip fracture is 9 3% with the 10 year risk of major osteoporotic fracture being 20% as calculated by the [facility]/WHO fracture risk assessment tool (FRAX)  4   The current NOF guidelines recommend treating patients with a T-score of -2 5 or less in the lumbar spine or hips, or in post-menopausal women and men over the age of 48 with low bone mass (osteopenia) and a FRAX 10 year risk score of >3% for hip   fracture and/or >20% for major osteoporotic fracture  5   The NOF recommends follow-up DXA in 1-2 years after initiating therapy for osteoporosis and every 2 years thereafter  More frequent evaluation is appropriate for patients with conditions associated with rapid bone loss, such as glucocorticoid   therapy  The interval between DXA screenings may be longer for individuals without major risk factors and initial T-score in the normal or upper low bone mass range         05/18/22 - [facility]urg [facility]  Pt remains under surveillance  Has some tenderness in left breast  - this is normal post surgery  Pt to follow up in 6 months    10/20/22 - Mammo diagnostic bilateral w 3d & cad  FINDINGS:   Bilateral  There are no suspicious masses, grouped microcalcifications or areas of unexplained architectural distortion  The skin and nipple areolar complex are unremarkable  Postop changes on left are stable  Diminished nodularity in the right  Benign calcifications bilaterally  IMPRESSION:   No worrisome findings  Diagnostic mammography recommended in 1 year  ASSESSMENT/BI-RADS CATEGORY:  Overall: 2 - Benign      Upcomin23 - Surg Onc [facility]  10/25/23 - Mammo diagnostic bilateral w 3d & cad      Follow up visit     Oncology History   Basal cell carcinoma of skin   10/3/2013 Initial Diagnosis    Basal cell carcinoma of skin     History of ductal carcinoma in situ (DCIS) of breast   10/23/2020 Biopsy    Left breast stereotactic biopsy  5 o'clock; middle portion  Ductal carcinoma in situ  Grade 2    RI 90    Malignancy appears unifocal  Calcifications cover 1 cm  US left axilla not performed  RIght breast clear  2020 Surgery    Left breast needle localized lumpectomy  Ductal carcinoma in situ  Grade 2  Posterior margin positive for DCIS   Stage 0     2021 - 2021 Hormone Therapy    Consult with Dr Dariusz Lawler  Hormonal therapy not recommended     3/4/2021 - 2021 Radiation    Course: C1    Plan ID Energy Fractions Dose per Fraction (cGy) Dose Correction (cGy) Total Dose Delivered (cGy) Elapsed Days   BH L [id number] 6X 25 / 25 200 0 5,000 35   BH L Breast e 12E 8 / 8 200 0 1,600 11    Dr Kelvin Luis          Review of Systems:  Review of Systems   Constitutional: Positive for chills (at times) and fatigue  HENT: Negative  Eyes: Negative  Respiratory: Negative  Cardiovascular: Negative  Gastrointestinal: Negative  Endocrine: Negative  Genitourinary: Negative  Musculoskeletal: Negative  Skin: Negative  Allergic/Immunologic: Negative      Neurological: Positive for headaches (sinus headaches at times)  Hematological: Negative  Psychiatric/Behavioral: Positive for sleep disturbance         Clinical Trial: no      Health Maintenance   Topic Date Due   • COVID-19 Vaccine (3 - Booster for Moderna series) 04/24/2021   • Influenza Vaccine (1) 09/01/2022   • Fall Risk  10/18/2022   • Urinary Incontinence Screening  10/18/2022   • Medicare Annual Wellness Visit (AWV)  10/18/2022   • BMI: Followup Plan  10/18/2022   • BMI: Adult  10/20/2023   • Breast Cancer Screening: Mammogram  10/20/2023   • Depression Screening  01/19/2024   • Colorectal Cancer Screening  09/27/2024   • Hepatitis C Screening  Completed   • Osteoporosis Screening  Completed   • Pneumococcal Vaccine: 65+ Years  Completed   • HIB Vaccine  Aged Out   • IPV Vaccine  Aged Out   • Hepatitis A Vaccine  Aged Out   • Meningococcal ACWY Vaccine  Aged Out   • HPV Vaccine  Aged Out     Patient Active Problem List   Diagnosis   • Basal cell carcinoma of skin   • Benign essential hypertension   • Mixed hyperlipidemia   • GERD without esophagitis   • Osteoarthritis of knee   • Osteopenia of multiple sites   • Overweight with body mass index (BMI) of 27 to 27 9 in adult   • IFG (impaired fasting glucose)   • History of ductal carcinoma in situ (DCIS) of breast   • Plantar fasciitis   • Greater trochanteric bursitis of left hip     Past Medical History:   Diagnosis Date   • Basal cell carcinoma 2015    Right wrist   • Basal cell carcinoma 09/2020    Right eye/cheek   • Breast cancer ([address] ) 12/23/2020    left   • Cancer [address] [address] )     BCC   • Fibrocystic breast disease    • GERD (gastroesophageal reflux disease)    • History of radiation therapy 2021    Left breast WBRT   • Hypercholesterolemia    • Hypertension      Past Surgical History:   Procedure Laterality Date   • BREAST BIOPSY Left 10/23/2020    stereo bx- dcis   • BREAST EXCISIONAL BIOPSY Right     neg- pt does not know when   • BREAST LUMPECTOMY Left 12/23/2020 Procedure: BREAST NEEDLE LOCALIZED LUMPECTOMY (NEEDLE LOC AT 0700);   Surgeon: Fabiola Bucio MD;  [facility]: [facility];  Service: Surgical Oncology   • COLONOSCOPY     • MAMMO NEEDLE LOCALIZATION LEFT (ALL INC) Left 12/23/2020   • MAMMO STEREOTACTIC BREAST BIOPSY LEFT (ALL INC) Left 10/23/2020     Family History   Problem Relation Age of Onset   • Hypertension Mother    • Thyroid disease Mother    • Diabetes Brother    • Kidney failure Brother    • No Known Problems Father    • No Known Problems Daughter    • No Known Problems Maternal Grandmother    • No Known Problems Maternal Grandfather    • No Known Problems Paternal Grandmother    • No Known Problems Paternal Grandfather    • No Known Problems Daughter    • No Known Problems Maternal Aunt    • No Known Problems Maternal Aunt    • No Known Problems Maternal Aunt    • No Known Problems Maternal Aunt    • No Known Problems Son    • No Known Problems Maternal Uncle    • No Known Problems Paternal Uncle    • No Known Problems Brother    • No Known Problems Maternal Uncle    • No Known Problems Paternal Uncle      Social History     Socioeconomic History   • Marital status: /Civil Union     Spouse name: Not on file   • Number of children: Not on file   • Years of education: Not on file   • Highest education level: Not on file   Occupational History   • Not on file   Tobacco Use   • Smoking status: Never   • Smokeless tobacco: Never   Vaping Use   • Vaping Use: Never used   Substance and Sexual Activity   • Alcohol use: No   • Drug use: No   • Sexual activity: Not Currently     Partners: Male     Birth control/protection: None   Other Topics Concern   • Not on file   Social History Narrative   • Not on file     Social Determinants of Health     Financial Resource Strain: Not on file   Food Insecurity: Not on file   Transportation Needs: Not on file   Physical Activity: Not on file   Stress: Not on file   Social Connections: Not on file   Intimate Partner Violence: Not on file   Housing Stability: Not on file       Current Outpatient Medications:   •  Acetaminophen (TYLENOL 8 HOUR PO), Take by mouth as needed  , Disp: , Rfl:   •  calcium citrate-vitamin D (CITRACAL+D) 315-200 MG-UNIT per tablet, Take 1 tablet by mouth daily, Disp: , Rfl:   •  carvedilol (COREG) 3 125 mg tablet, Take 3 125 mg by mouth in the morning and 3 125 mg in the evening  Take with meals  , Disp: , Rfl:   •  famotidine (PEPCID) 20 mg tablet, Take 1 tablet (20 mg total) by mouth 2 (two) times a day, Disp: 180 tablet, Rfl: 1  •  fluticasone (FLONASE) 50 mcg/act nasal spray, 1 spray into each nostril as needed , Disp: , Rfl:   •  ibuprofen (MOTRIN) 200 mg tablet, Take by mouth every 6 (six) hours as needed for mild pain, Disp: , Rfl:   •  lisinopril-hydrochlorothiazide (PRINZIDE,ZESTORETIC) 20-12 5 MG per tablet, Take 1 tablet by mouth daily (Patient taking differently: Take 1 tablet by mouth daily 1/2 tablet twice a day right now), Disp: 90 tablet, Rfl: 1  •  loratadine (CLARITIN) 10 mg tablet, Take 1 tablet by mouth daily as needed, Disp: , Rfl:   •  multivitamin-minerals (CENTRUM) tablet, Take 1 tablet by mouth daily, Disp: , Rfl:   •  pravastatin (PRAVACHOL) 10 mg tablet, Take 40 mg by mouth daily, Disp: , Rfl:   •  metoprolol succinate (TOPROL-XL) 50 mg 24 hr tablet, Take 1 tablet (50 mg total) by mouth daily, Disp: 90 tablet, Rfl: 2  •  rosuvastatin (CRESTOR) 20 MG tablet, Take 1 tablet (20 mg total) by mouth daily (Patient not taking: Reported on 10/21/2022), Disp: 90 tablet, Rfl: 3  No Known Allergies  Vitals:    01/19/23 1516   BP: 164/86   Pulse: 75   Temp: 98 6 °F (37 °C)   SpO2: 99%   Height: 5' 1" (1 549 m)      Pain Score: 0-No pain

## 2023-01-19 NOTE — PROGRESS NOTES
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  Her tumor is ERPR positive   She status post breast conservation surgery   She has a positive posterior margin at the chest wall  She completed a course of adjuvant radiation therapy to the left breast on 4/20/21  Dr Dolly Byers did not recommend adjuvant hormonal therapy  The pt was last seen in radiation on 12/16/21  She returns today for her follow up        04/11/22 - DXA bone density spine hip and pelvis  IMPRESSION:  1   Low bone mass (osteopenia)  2   Since a DXA study from 5/31/2019, there has been:  A  STATISTICALLY SIGNIFICANT INCREASE in bone mineral density of  0 027 g/cm2 (3 3)% in the lumbar spine   This may be artifactual, in part, due to progression of spondylosis since the last DXA study  A  STATISTICALLY SIGNIFICANT INCREASE in bone mineral density of  0 047 g/cm2 (6 2)% in the left total hip  3   The 10 year risk of hip fracture is 9 3% with the 10 year risk of major osteoporotic fracture being 20% as calculated by the [facility]/WHO fracture risk assessment tool (FRAX)  4   The current NOF guidelines recommend treating patients with a T-score of -2 5 or less in the lumbar spine or hips, or in post-menopausal women and men over the age of 48 with low bone mass (osteopenia) and a FRAX 10 year risk score of >3% for hip   fracture and/or >20% for major osteoporotic fracture  5   The NOF recommends follow-up DXA in 1-2 years after initiating therapy for osteoporosis and every 2 years thereafter  More frequent evaluation is appropriate for patients with conditions associated with rapid bone loss, such as glucocorticoid   therapy   The interval between DXA screenings may be longer for individuals without major risk factors and initial T-score in the normal or upper low bone mass range         22 - Surg Onc, [place]  Pt remains under surveillance  Has some tenderness in left breast  - this is normal post surgery  Pt to follow up in 6 months     10/20/22 - Mammo diagnostic bilateral w 3d & cad  FINDINGS:   Bilateral  There are no suspicious masses, grouped microcalcifications or areas of unexplained architectural distortion  The skin and nipple areolar complex are unremarkable  Postop changes on left are stable   Diminished nodularity in the right   Benign calcifications bilaterally  IMPRESSION:   No worrisome findings   Diagnostic mammography recommended in 1 year  ASSESSMENT/BI-RADS CATEGORY:  Overall: 2 - Benign        Upcomin23 - Surg Onc, [place]  10/25/23 - Mammo diagnostic bilateral w 3d & cad            Historical Information   Oncology History   Basal cell carcinoma of skin   10/3/2013 Initial Diagnosis    Basal cell carcinoma of skin     History of ductal carcinoma in situ (DCIS) of breast   10/23/2020 Biopsy    Left breast stereotactic biopsy  5 o'clock; middle portion  Ductal carcinoma in situ  Grade 2    AK 90    Malignancy appears unifocal  Calcifications cover 1 cm  US left axilla not performed  RIght breast clear       2020 Surgery    Left breast needle localized lumpectomy  Ductal carcinoma in situ  Grade 2  Posterior margin positive for DCIS   Stage 0     2021 - 2021 Hormone Therapy    Consult with Dr Mendel Roberts  Hormonal therapy not recommended     3/4/2021 - 2021 Radiation    Course: C1    Plan ID Energy Fractions Dose per Fraction (cGy) Dose Correction (cGy) Total Dose Delivered (cGy) Elapsed Days   BH L [id number] 6X 25 / 25 200 0 5,000 35   BH L Breast e 12E  200 0 1,600 11    Dr Shawn Sagastume          Past Medical History:   Diagnosis Date   • Basal cell carcinoma     Right wrist   • Basal cell carcinoma 2020    Right eye/cheek • Breast cancer ([facility] [address] ) 12/23/2020    left   • Cancer ([facility] [address] )     BCC   • Fibrocystic breast disease    • GERD (gastroesophageal reflux disease)    • History of radiation therapy 2021    Left breast WBRT   • Hypercholesterolemia    • Hypertension      Past Surgical History:   Procedure Laterality Date   • BREAST BIOPSY Left 10/23/2020    stereo bx- dcis   • BREAST EXCISIONAL BIOPSY Right     neg- pt does not know when   • BREAST LUMPECTOMY Left 12/23/2020    Procedure: BREAST NEEDLE LOCALIZED LUMPECTOMY (NEEDLE LOC AT 0700); Surgeon: Jaguar Hutchinson MD;  [facility];  Service: Surgical Oncology   • COLONOSCOPY     • MAMMO NEEDLE LOCALIZATION LEFT (ALL INC) Left 12/23/2020   • MAMMO STEREOTACTIC BREAST BIOPSY LEFT (ALL INC) Left 10/23/2020       Social History   Social History     Substance and Sexual Activity   Alcohol Use No     Social History     Substance and Sexual Activity   Drug Use No     Social History     Tobacco Use   Smoking Status Never   Smokeless Tobacco Never         Meds/Allergies     Current Outpatient Medications:   •  Acetaminophen (TYLENOL 8 HOUR PO), Take by mouth as needed  , Disp: , Rfl:   •  calcium citrate-vitamin D (CITRACAL+D) 315-200 MG-UNIT per tablet, Take 1 tablet by mouth daily, Disp: , Rfl:   •  carvedilol (COREG) 3 125 mg tablet, Take 3 125 mg by mouth in the morning and 3 125 mg in the evening  Take with meals  , Disp: , Rfl:   •  famotidine (PEPCID) 20 mg tablet, Take 1 tablet (20 mg total) by mouth 2 (two) times a day, Disp: 180 tablet, Rfl: 1  •  fluticasone (FLONASE) 50 mcg/act nasal spray, 1 spray into each nostril as needed , Disp: , Rfl:   •  ibuprofen (MOTRIN) 200 mg tablet, Take by mouth every 6 (six) hours as needed for mild pain, Disp: , Rfl:   •  lisinopril-hydrochlorothiazide (PRINZIDE,ZESTORETIC) 20-12 5 MG per tablet, Take 1 tablet by mouth daily (Patient taking differently: Take 1 tablet by mouth daily 1/2 tablet twice a day right now), Disp: 90 tablet, Rfl: 1  • loratadine (CLARITIN) 10 mg tablet, Take 1 tablet by mouth daily as needed, Disp: , Rfl:   •  multivitamin-minerals (CENTRUM) tablet, Take 1 tablet by mouth daily, Disp: , Rfl:   •  pravastatin (PRAVACHOL) 10 mg tablet, Take 40 mg by mouth daily, Disp: , Rfl:   •  metoprolol succinate (TOPROL-XL) 50 mg 24 hr tablet, Take 1 tablet (50 mg total) by mouth daily, Disp: 90 tablet, Rfl: 2  •  rosuvastatin (CRESTOR) 20 MG tablet, Take 1 tablet (20 mg total) by mouth daily (Patient not taking: Reported on 10/21/2022), Disp: 90 tablet, Rfl: 3  No Known Allergies      Review of Systems   Constitutional: Positive for chills (at times) and fatigue  HENT: Negative  Eyes: Negative  Respiratory: Negative  Cardiovascular: Negative  Gastrointestinal: Negative  Endocrine: Negative  Genitourinary: Negative  Musculoskeletal: Negative  Skin: Negative  Allergic/Immunologic: Negative  Neurological: Positive for headaches (sinus headaches at times)  Hematological: Negative  Psychiatric/Behavioral: Positive for sleep disturbance  OBJECTIVE:   /86 Comment: due for BP medication, monitoring at home  Pulse 75   Temp 98 6 °F (37 °C)   Ht 5' 1" (1 549 m)   Wt (P) 67 4 kg (148 lb 9 6 oz)   SpO2 99%   BMI (P) 28 08 kg/m²   Pain Assessment:  0  ECOG/[facility]/WHO: 0 - Asymptomatic    Physical Exam   There is no supraclavicular or axillary adenopathy palpable the skin in the radiated field is in good condition  No breast or arm edema  Ambulating dependently  RESULTS    Lab Results: No results found for this or any previous visit (from the past 672 hour(s))  Imaging Studies:No results found  Assessment/Plan:  No orders of the defined types were placed in this encounter  Kamila Peters is a 74y o  year old female who is nearly 1 year post adjuvant radiation therapy for left breast DCIS  She has no clinical evidence of recurrence  Mammogram from October remained stable  She will return for follow-up visit in 1 year      Nnamdi Gallardo MD  1/19/2023,4:30 PM    Portions of the record may have been created with voice recognition software   Occasional wrong word or "sound a like" substitutions may have occurred due to the inherent limitations of voice recognition software   Read the chart carefully and recognize, using context, where substitutions have occurred

## 2023-01-25 ENCOUNTER — TELEPHONE (OUTPATIENT)
Dept: HEMATOLOGY ONCOLOGY | Facility: CLINIC | Age: 73
End: 2023-01-25

## 2023-01-25 NOTE — TELEPHONE ENCOUNTER
Appointment Cancellation Or Reschedule     Person calling in Patient   If someone other than patient calling, are they listed on the communication consent form? na   Provider Heron Lennox, [address]   Office Visit Date and Time 1/25/23 @ 3:30pm   Office Visit Location [facility]   Did patient want to reschedule their office appointment? If so, when was it scheduled to? 2/1/23 @ 11:30am   Did you have STAR scheduled for this appointment? no   Do you need STAR set up for your new appointment? If yes, please send to "PATIENT RIDESHARE" pool for STAR rescheduling no   Is this patient calling to reschedule an infusion appointment? no   When is their next infusion appointment? na   Is this patient a Chemo patient? no   Reason for Cancellation or Reschedule Patient is unable to keep appointment- not feeling well      If the patient is cancelling an appointment and needs their STAR Transport cancelled, please route to [facility]  If the patient is a treatment patient, please route this to the office nurse  If the patient is not on treatment, please route to the Clerical pool based on location  If the patient is a surgical oncology patient, please route to surg/onc clinical pool  Route message as high priority if same day cancellation

## 2023-02-01 ENCOUNTER — OFFICE VISIT (OUTPATIENT)
Dept: SURGICAL ONCOLOGY | Facility: CLINIC | Age: 73
End: 2023-02-01

## 2023-02-01 VITALS
BODY MASS INDEX: 27.38 KG/M2 | SYSTOLIC BLOOD PRESSURE: 156 MMHG | OXYGEN SATURATION: 99 % | WEIGHT: 145 LBS | RESPIRATION RATE: 16 BRPM | HEIGHT: 61 IN | TEMPERATURE: 97.6 F | DIASTOLIC BLOOD PRESSURE: 78 MMHG | HEART RATE: 80 BPM

## 2023-02-01 DIAGNOSIS — Z86.000 HISTORY OF DUCTAL CARCINOMA IN SITU (DCIS) OF BREAST: Primary | ICD-10-CM

## 2023-02-01 NOTE — PROGRESS NOTES
Surgical Oncology Follow Up       [address]  [facility]  [address] [facility] [place] [address]    Collin Burt  1950  [id number]  [address] [facility]  [facility]  [address] [facility] [id number]    No chief complaint on file  Assessment/Plan:  1  History of ductal carcinoma in situ (DCIS) of breast  - 6 month follow up       Discussion/Summary: Patient is a 74year old female presenting today for six-month follow-up for left breast cancer diagnosed in October 2020  Pathology revealed DCIS ER/AL positive  She underwent a left breast lumpectomy with Dr Alaina Argueta and completed whole breast radiation therapy  Hormonal therapy was not recommended  She is on obs  She had a bilateral diagnostic mammogram on 10/20/2022 which was BI-RADS 2 category 2 density  There were no concerns on her cbe  I will see the patient back in 6 months or sooner should the need arise  She was instructed to call with any questions or concerns prior to this time  All questions were answered today  History of Present Illness:     Oncology History   Basal cell carcinoma of skin   10/3/2013 Initial Diagnosis    Basal cell carcinoma of skin     History of ductal carcinoma in situ (DCIS) of breast   10/23/2020 Biopsy    Left breast stereotactic biopsy  5 o'clock; middle portion  Ductal carcinoma in situ  Grade 2    AL 90    Malignancy appears unifocal  Calcifications cover 1 cm  US left axilla not performed  RIght breast clear       12/23/2020 Surgery    Left breast needle localized lumpectomy  Ductal carcinoma in situ  Grade 2  Posterior margin positive for DCIS   Stage 0     2/16/2021 - 2/16/2021 Hormone Therapy    Consult with Dr Cat Ocampo  Hormonal therapy not recommended     3/4/2021 - 4/20/2021 Radiation    Course: C1    Plan ID Energy Fractions Dose per Fraction (cGy) Dose Correction (cGy) Total Dose Delivered (cGy) Elapsed Days   [facility] [id number] 6X 25 / 25 200 0 5,000 28    [facility] Breast e 12E 8 / 8 200 0 1,600 11    Dr Monae Lynn           -Interval History:  Patient is a 74year old female presenting today for six-month follow-up for left breast cancer diagnosed in October 2020  She had a bilateral diagnostic mammogram on 10/20/2022 which was BI-RADS 2 category 2 density  Patient denies changes on her breast exam  She notes tenderness over scar tissue of lumpectomy site  She denies persistent headaches, bone pain, back pain, shortness of breath, or abdominal pain  Review of Systems:  Review of Systems   Constitutional: Negative for activity change, appetite change, fatigue and unexpected weight change  Respiratory: Negative for cough and shortness of breath  Cardiovascular: Negative for chest pain  Gastrointestinal: Negative for abdominal pain, diarrhea, nausea and vomiting  Endocrine: Negative for heat intolerance  Musculoskeletal: Negative for arthralgias, back pain and myalgias  Skin: Negative for rash  Neurological: Negative for weakness and headaches  Hematological: Negative for adenopathy         Patient Active Problem List   Diagnosis   • Basal cell carcinoma of skin   • Benign essential hypertension   • Mixed hyperlipidemia   • GERD without esophagitis   • Osteoarthritis of knee   • Osteopenia of multiple sites   • Overweight with body mass index (BMI) of 27 to 27 9 in adult   • IFG (impaired fasting glucose)   • History of ductal carcinoma in situ (DCIS) of breast   • Plantar fasciitis   • Greater trochanteric bursitis of left hip     Past Medical History:   Diagnosis Date   • Basal cell carcinoma 2015    Right wrist   • Basal cell carcinoma 09/2020    Right eye/cheek   • Breast cancer ([facility] ) 12/23/2020    left   • Cancer (HCC)     BCC   • Fibrocystic breast disease    • GERD (gastroesophageal reflux disease)    • History of radiation therapy 2021    Left breast WBRT   • Hypercholesterolemia    • Hypertension      Past Surgical History:   Procedure Laterality Date   • BREAST BIOPSY Left 10/23/2020    stereo bx- dcis   • BREAST EXCISIONAL BIOPSY Right     neg- pt does not know when   • BREAST LUMPECTOMY Left 12/23/2020    Procedure: BREAST NEEDLE LOCALIZED LUMPECTOMY (NEEDLE LOC AT 0700);   Surgeon: Alfonzo Sanchez MD;  [facility];  Service: Surgical Oncology   • COLONOSCOPY     • MAMMO NEEDLE LOCALIZATION LEFT (ALL INC) Left 12/23/2020   • MAMMO STEREOTACTIC BREAST BIOPSY LEFT (ALL INC) Left 10/23/2020     Family History   Problem Relation Age of Onset   • Hypertension Mother    • Thyroid disease Mother    • Diabetes Brother    • Kidney failure Brother    • No Known Problems Father    • No Known Problems Daughter    • No Known Problems Maternal Grandmother    • No Known Problems Maternal Grandfather    • No Known Problems Paternal Grandmother    • No Known Problems Paternal Grandfather    • No Known Problems Daughter    • No Known Problems Maternal Aunt    • No Known Problems Maternal Aunt    • No Known Problems Maternal Aunt    • No Known Problems Maternal Aunt    • No Known Problems Son    • No Known Problems Maternal Uncle    • No Known Problems Paternal Uncle    • No Known Problems Brother    • No Known Problems Maternal Uncle    • No Known Problems Paternal Uncle      Social History     Socioeconomic History   • Marital status: /Civil Union     Spouse name: Not on file   • Number of children: Not on file   • Years of education: Not on file   • Highest education level: Not on file   Occupational History   • Not on file   Tobacco Use   • Smoking status: Never   • Smokeless tobacco: Never   Vaping Use   • Vaping Use: Never used   Substance and Sexual Activity   • Alcohol use: No   • Drug use: No   • Sexual activity: Not Currently     Partners: Male     Birth control/protection: None   Other Topics Concern   • Not on file   Social History Narrative   • Not on file     Social Determinants of Health     Financial Resource Strain: Not on file   Food Insecurity: Not on file   Transportation Needs: Not on file   Physical Activity: Not on file   Stress: Not on file   Social Connections: Not on file   Intimate Partner Violence: Not on file   Housing Stability: Not on file       Current Outpatient Medications:   •  Acetaminophen (TYLENOL 8 HOUR PO), Take by mouth as needed  , Disp: , Rfl:   •  calcium citrate-vitamin D (CITRACAL+D) 315-200 MG-UNIT per tablet, Take 1 tablet by mouth daily, Disp: , Rfl:   •  carvedilol (COREG) 3 125 mg tablet, Take 3 125 mg by mouth in the morning and 3 125 mg in the evening  Take with meals  , Disp: , Rfl:   •  famotidine (PEPCID) 20 mg tablet, Take 1 tablet (20 mg total) by mouth 2 (two) times a day, Disp: 180 tablet, Rfl: 1  •  fluticasone (FLONASE) 50 mcg/act nasal spray, 1 spray into each nostril as needed , Disp: , Rfl:   •  ibuprofen (MOTRIN) 200 mg tablet, Take by mouth every 6 (six) hours as needed for mild pain, Disp: , Rfl:   •  lisinopril-hydrochlorothiazide (PRINZIDE,ZESTORETIC) 20-12 5 MG per tablet, Take 1 tablet by mouth daily (Patient taking differently: Take 1 tablet by mouth daily 1/2 tablet twice a day right now), Disp: 90 tablet, Rfl: 1  •  loratadine (CLARITIN) 10 mg tablet, Take 1 tablet by mouth daily as needed, Disp: , Rfl:   •  metoprolol succinate (TOPROL-XL) 50 mg 24 hr tablet, Take 1 tablet (50 mg total) by mouth daily, Disp: 90 tablet, Rfl: 2  •  multivitamin-minerals (CENTRUM) tablet, Take 1 tablet by mouth daily, Disp: , Rfl:   •  pravastatin (PRAVACHOL) 10 mg tablet, Take 40 mg by mouth daily, Disp: , Rfl:   •  rosuvastatin (CRESTOR) 20 MG tablet, Take 1 tablet (20 mg total) by mouth daily (Patient not taking: Reported on 10/21/2022), Disp: 90 tablet, Rfl: 3  No Known Allergies  Vitals:    02/01/23 1130   Resp: 16       Physical Exam  Constitutional:       General: She is not in acute distress  Appearance: Normal appearance     Cardiovascular: Rate and Rhythm: Normal rate and regular rhythm  Pulses: Normal pulses  Heart sounds: Normal heart sounds  Pulmonary:      Effort: Pulmonary effort is normal       Breath sounds: Normal breath sounds  Chest:      Chest wall: No mass  Breasts:     Right: No swelling, bleeding, inverted nipple, mass, nipple discharge, skin change or tenderness  Left: Tenderness present  No swelling, bleeding, inverted nipple, mass, nipple discharge or skin change  Comments: Left breast lumpectomy scar with sln bx scar  No masses, nodularity, skin changes, nipple changes or discharge, or adenopathy appreciated on physical exam      Abdominal:      General: Abdomen is flat  Palpations: Abdomen is soft  Lymphadenopathy:      Upper Body:      Right upper body: No supraclavicular, axillary or pectoral adenopathy  Left upper body: No supraclavicular, axillary or pectoral adenopathy  Skin:     General: Skin is warm  Neurological:      General: No focal deficit present  Mental Status: She is alert and oriented to person, place, and time  Psychiatric:         Mood and Affect: Mood normal          Behavior: Behavior normal            Results:    Imaging  No results found  I reviewed the above imaging data  Advance Care Planning/Advance Directives:  Discussed disease status, cancer treatment plans and/or cancer treatment goals with the patient

## 2023-05-26 ENCOUNTER — OFFICE VISIT (OUTPATIENT)
Dept: URGENT CARE | Facility: MEDICAL CENTER | Age: 73
End: 2023-05-26

## 2023-05-26 VITALS
RESPIRATION RATE: 18 BRPM | DIASTOLIC BLOOD PRESSURE: 72 MMHG | TEMPERATURE: 98.2 F | WEIGHT: 145.2 LBS | BODY MASS INDEX: 26.72 KG/M2 | SYSTOLIC BLOOD PRESSURE: 158 MMHG | HEART RATE: 71 BPM | OXYGEN SATURATION: 97 % | HEIGHT: 62 IN

## 2023-05-26 DIAGNOSIS — H66.92 LEFT OTITIS MEDIA, UNSPECIFIED OTITIS MEDIA TYPE: Primary | ICD-10-CM

## 2023-05-26 DIAGNOSIS — J02.9 ACUTE PHARYNGITIS, UNSPECIFIED ETIOLOGY: ICD-10-CM

## 2023-05-26 RX ORDER — AMOXICILLIN 500 MG/1
500 CAPSULE ORAL EVERY 8 HOURS SCHEDULED
Qty: 21 CAPSULE | Refills: 0 | Status: SHIPPED | OUTPATIENT
Start: 2023-05-26 | End: 2023-06-02

## 2023-05-26 RX ORDER — LISINOPRIL 40 MG/1
40 TABLET ORAL DAILY
COMMUNITY
Start: 2023-03-28

## 2023-05-26 RX ORDER — PRAVASTATIN SODIUM 40 MG
TABLET ORAL
COMMUNITY
Start: 2023-03-23

## 2023-05-26 RX ORDER — CARVEDILOL 6.25 MG/1
TABLET ORAL
COMMUNITY
Start: 2023-04-05

## 2023-05-26 RX ORDER — HYDROCHLOROTHIAZIDE 12.5 MG/1
12.5 TABLET ORAL DAILY
COMMUNITY
Start: 2023-03-25

## 2023-05-26 NOTE — PROGRESS NOTES
[facility] [facility] [employer] [facility]        NAME: Hoa Kendall is a 74 y o  female  : 1950    MRN: [id number]  DATE: May 26, 2023  TIME: 9:26 AM    Assessment and Plan   Left otitis media, unspecified otitis media type [H66 [id number]]  1  Left otitis media, unspecified otitis media type  amoxicillin (AMOXIL) 500 mg capsule      2  Acute pharyngitis, unspecified etiology              Patient Instructions     Otitis media left  Amoxicillin as directed  Follow up with PCP in 3-5 days  Proceed to  ER if symptoms worsen  Chief Complaint     Chief Complaint   Patient presents with   • Sore Throat     Pt having painful swallowing and left ear pain x1 week          History of Present Illness       56-year-old female who presents complaining of sore throat, congestion x5 days and left ear pain x2 days  Patient denies fevers, chills, chest pain, shortness of breath  Declined COVID test at this time  Review of Systems   Review of Systems   Constitutional: Negative  Negative for activity change, appetite change, chills, diaphoresis and fatigue  HENT: Positive for congestion and ear pain  Negative for ear discharge, facial swelling, hearing loss, mouth sores, nosebleeds, postnasal drip, sinus pressure, sinus pain, sneezing and voice change  Eyes: Negative  Respiratory: Positive for cough  Negative for apnea, choking, chest tightness, shortness of breath, wheezing and stridor  Cardiovascular: Negative  Negative for chest pain, palpitations and leg swelling           Current Medications       Current Outpatient Medications:   •  Acetaminophen (TYLENOL 8 HOUR PO), Take by mouth as needed  , Disp: , Rfl:   •  amoxicillin (AMOXIL) 500 mg capsule, Take 1 capsule (500 mg total) by mouth every 8 (eight) hours for 7 days, Disp: 21 capsule, Rfl: 0  •  calcium citrate-vitamin D (CITRACAL+D) 315-200 MG-UNIT per tablet, Take 1 tablet by mouth daily, Disp: , Rfl:   •  carvedilol (COREG) 3 125 mg tablet, Take 3 125 mg by mouth in the morning and 3 125 mg in the evening  Take with meals  , Disp: , Rfl:   •  carvedilol (COREG) 6 25 mg tablet, , Disp: , Rfl:   •  famotidine (PEPCID) 20 mg tablet, Take 1 tablet (20 mg total) by mouth 2 (two) times a day, Disp: 180 tablet, Rfl: 1  •  fluticasone (FLONASE) 50 mcg/act nasal spray, 1 spray into each nostril as needed , Disp: , Rfl:   •  hydrochlorothiazide (HYDRODIURIL) 12 5 mg tablet, Take 12 5 mg by mouth daily, Disp: , Rfl:   •  ibuprofen (MOTRIN) 200 mg tablet, Take by mouth every 6 (six) hours as needed for mild pain, Disp: , Rfl:   •  lisinopril (ZESTRIL) 40 mg tablet, Take 40 mg by mouth daily, Disp: , Rfl:   •  loratadine (CLARITIN) 10 mg tablet, Take 1 tablet by mouth daily as needed, Disp: , Rfl:   •  multivitamin-minerals (CENTRUM) tablet, Take 1 tablet by mouth daily, Disp: , Rfl:   •  pravastatin (PRAVACHOL) 10 mg tablet, Take 40 mg by mouth daily, Disp: , Rfl:   •  pravastatin (PRAVACHOL) 40 mg tablet, , Disp: , Rfl:   •  rosuvastatin (CRESTOR) 20 MG tablet, Take 1 tablet (20 mg total) by mouth daily, Disp: 90 tablet, Rfl: 3  •  lisinopril-hydrochlorothiazide (PRINZIDE,ZESTORETIC) 20-12 5 MG per tablet, Take 1 tablet by mouth daily (Patient not taking: Reported on 2/1/2023), Disp: 90 tablet, Rfl: 1  •  metoprolol succinate (TOPROL-XL) 50 mg 24 hr tablet, Take 1 tablet (50 mg total) by mouth daily (Patient not taking: Reported on 2/1/2023), Disp: 90 tablet, Rfl: 2    Current Allergies     Allergies as of 05/26/2023   • (No Known Allergies)            The following portions of the patient's history were reviewed and updated as appropriate: allergies, current medications, past family history, past medical history, past social history, past surgical history and problem list      Past Medical History:   Diagnosis Date   • Basal cell carcinoma 2015    Right wrist   • Basal cell carcinoma 09/2020    Right eye/cheek   • Breast cancer ([facility] ) 12/23/2020    left   • Cancer ([facility] )     BCC   • "Fibrocystic breast disease    • GERD (gastroesophageal reflux disease)    • History of radiation therapy 2021    Left breast WBRT   • Hypercholesterolemia    • Hypertension        Past Surgical History:   Procedure Laterality Date   • BREAST BIOPSY Left 10/23/2020    stereo bx- dcis   • BREAST EXCISIONAL BIOPSY Right     neg- pt does not know when   • BREAST LUMPECTOMY Left 12/23/2020    Procedure: BREAST NEEDLE LOCALIZED LUMPECTOMY (NEEDLE LOC AT 0700); Surgeon: Richard Hernandez MD;  Location[facility];  Service: Surgical Oncology   • COLONOSCOPY     • MAMMO NEEDLE LOCALIZATION LEFT (ALL INC) Left 12/23/2020   • MAMMO STEREOTACTIC BREAST BIOPSY LEFT (ALL INC) Left 10/23/2020       Family History   Problem Relation Age of Onset   • Hypertension Mother    • Thyroid disease Mother    • Diabetes Brother    • Kidney failure Brother    • No Known Problems Father    • No Known Problems Daughter    • No Known Problems Maternal Grandmother    • No Known Problems Maternal Grandfather    • No Known Problems Paternal Grandmother    • No Known Problems Paternal Grandfather    • No Known Problems Daughter    • No Known Problems Maternal Aunt    • No Known Problems Maternal Aunt    • No Known Problems Maternal Aunt    • No Known Problems Maternal Aunt    • No Known Problems Son    • No Known Problems Maternal Uncle    • No Known Problems Paternal Uncle    • No Known Problems Brother    • No Known Problems Maternal Uncle    • No Known Problems Paternal Uncle          Medications have been verified  Objective   /72   Pulse 71   Temp 98 2 °F (36 8 °C) (Temporal)   Resp 18   Ht 5' 2\" (1 575 m)   Wt 65 9 kg (145 lb 3 2 oz)   SpO2 97%   BMI 26 56 kg/m²        Physical Exam     Physical Exam  Constitutional:       General: She is not in acute distress  Appearance: She is well-developed  She is not diaphoretic  HENT:      Head: Normocephalic and atraumatic        Right Ear: Hearing, tympanic membrane, ear canal and " external ear normal       Left Ear: Hearing, ear canal and external ear normal  No drainage, swelling or tenderness  No middle ear effusion  Tympanic membrane is erythematous  Nose: Rhinorrhea present  Mouth/Throat:      Pharynx: Uvula midline  Cardiovascular:      Rate and Rhythm: Normal rate and regular rhythm  Heart sounds: Normal heart sounds  Pulmonary:      Effort: Pulmonary effort is normal       Breath sounds: Normal breath sounds  Musculoskeletal:      Cervical back: Normal range of motion and neck supple  Lymphadenopathy:      Cervical: Cervical adenopathy present

## 2023-07-13 NOTE — ASSESSMENT & PLAN NOTE
May 2019: LUMBAR SPINE L1-L4 : T-score -2 0  LEFT  TOTAL HIP: T-score -1 5  LEFT  FEMORAL NECK: T score -2 1    Continue Vit D/Calcium supplements  Repeat DEXA May 2021 Yes

## 2023-08-15 ENCOUNTER — OFFICE VISIT (OUTPATIENT)
Dept: SURGICAL ONCOLOGY | Facility: CLINIC | Age: 73
End: 2023-08-15
Payer: MEDICARE

## 2023-08-15 VITALS
RESPIRATION RATE: 16 BRPM | OXYGEN SATURATION: 98 % | HEIGHT: 62 IN | DIASTOLIC BLOOD PRESSURE: 74 MMHG | SYSTOLIC BLOOD PRESSURE: 138 MMHG | WEIGHT: 145 LBS | TEMPERATURE: 97.2 F | BODY MASS INDEX: 26.68 KG/M2 | HEART RATE: 75 BPM

## 2023-08-15 DIAGNOSIS — Z86.000 HISTORY OF DUCTAL CARCINOMA IN SITU (DCIS) OF BREAST: Primary | ICD-10-CM

## 2023-08-15 PROCEDURE — 99213 OFFICE O/P EST LOW 20 MIN: CPT

## 2023-08-15 RX ORDER — BIOTIN 5 MG
5000 TABLET ORAL DAILY
COMMUNITY

## 2023-08-15 NOTE — PROGRESS NOTES
Surgical Oncology Follow Up       [address]  [facility] [address]    Sebastian Guillaume  1950  [id number]  [address]  [facility] [id number]    Chief Complaint   Patient presents with   • Follow-up       Assessment/Plan:  1. History of ductal carcinoma in situ (DCIS) of breast  - 6 month follow up     Discussion/Summary: Patient is a 74year old female presenting today for six-month follow-up for left breast cancer diagnosed in October 2020.  Pathology revealed DCIS ER/PA positive. She underwent a left breast lumpectomy with Dr. Cr Langston and completed whole breast radiation therapy. Hormonal therapy was not recommended.  She is on obs. She is scheduled for a mammogram in October. There were no concerns on her cbe. I will see the patient back in 6 months or sooner should the need arise. She was instructed to call with any questions or concerns prior to this time. All questions were answered today.         History of Present Illness:     Oncology History   Basal cell carcinoma of skin   10/3/2013 Initial Diagnosis    Basal cell carcinoma of skin     History of ductal carcinoma in situ (DCIS) of breast   10/23/2020 Biopsy    Left breast stereotactic biopsy  5 o'clock; middle portion  Ductal carcinoma in situ  Grade 2    PA 90    Malignancy appears unifocal. Calcifications cover 1 cm. US left axilla not performed. RIght breast clear.      12/23/2020 Surgery    Left breast needle localized lumpectomy  Ductal carcinoma in situ  Grade 2  Posterior margin positive for DCIS   Stage 0     2/16/2021 - 2/16/2021 Hormone Therapy    Consult with Dr. Ksenia Peralta  Hormonal therapy not recommended     3/4/2021 - 4/20/2021 Radiation    Course: C1    Plan ID Energy Fractions Dose per Fraction (cGy) Dose Correction (cGy) Total Dose Delivered (cGy) Elapsed Days   [facility] [id number] 6X 25 / 25 200 0 5,000 35   [address] 8 / 8 200 0 1,600 11    Dr Sadaf Betancur           -Interval History: Patient is a 74year old female presenting today for six-month follow-up for left breast cancer diagnosed in October 2020. She is on obs. Patient denies changes on her breast exam. She denies persistent headaches, bone pain, back pain, shortness of breath, or abdominal pain. Review of Systems:  Review of Systems   Constitutional: Negative for activity change, appetite change, fatigue and unexpected weight change. Respiratory: Negative for cough and shortness of breath. Cardiovascular: Negative for chest pain. Gastrointestinal: Negative for abdominal pain, diarrhea, nausea and vomiting. Endocrine: Negative for heat intolerance. Musculoskeletal: Negative for arthralgias, back pain and myalgias. Skin: Negative for rash. Neurological: Negative for weakness and headaches. Hematological: Negative for adenopathy.        Patient Active Problem List   Diagnosis   • Basal cell carcinoma of skin   • Benign essential hypertension   • Mixed hyperlipidemia   • GERD without esophagitis   • Osteoarthritis of knee   • Osteopenia of multiple sites   • Overweight with body mass index (BMI) of 27 to 27.9 in adult   • IFG (impaired fasting glucose)   • History of ductal carcinoma in situ (DCIS) of breast   • Plantar fasciitis   • Greater trochanteric bursitis of left hip     Past Medical History:   Diagnosis Date   • Basal cell carcinoma 2015    Right wrist   • Basal cell carcinoma 09/2020    Right eye/cheek   • Breast cancer ([address]) 12/23/2020    left   • Cancer (HCC)     BCC   • Fibrocystic breast disease    • GERD (gastroesophageal reflux disease)    • History of radiation therapy 2021    Left breast WBRT   • Hypercholesterolemia    • Hypertension      Past Surgical History:   Procedure Laterality Date   • BREAST BIOPSY Left 10/23/2020    stereo bx- dcis   • BREAST EXCISIONAL BIOPSY Right     neg- pt does not know when   • BREAST LUMPECTOMY Left 12/23/2020    Procedure: BREAST NEEDLE LOCALIZED LUMPECTOMY (NEEDLE LOC AT 0700);   Surgeon: Dannie Huitron MD;  [facility];  Service: Surgical Oncology   • COLONOSCOPY     • MAMMO NEEDLE LOCALIZATION LEFT (ALL INC) Left 12/23/2020   • MAMMO STEREOTACTIC BREAST BIOPSY LEFT (ALL INC) Left 10/23/2020     Family History   Problem Relation Age of Onset   • Hypertension Mother    • Thyroid disease Mother    • Diabetes Brother    • Kidney failure Brother    • No Known Problems Father    • No Known Problems Daughter    • No Known Problems Maternal Grandmother    • No Known Problems Maternal Grandfather    • No Known Problems Paternal Grandmother    • No Known Problems Paternal Grandfather    • No Known Problems Daughter    • No Known Problems Maternal Aunt    • No Known Problems Maternal Aunt    • No Known Problems Maternal Aunt    • No Known Problems Maternal Aunt    • No Known Problems Son    • No Known Problems Maternal Uncle    • No Known Problems Paternal Uncle    • No Known Problems Brother    • No Known Problems Maternal Uncle    • No Known Problems Paternal Uncle      Social History     Socioeconomic History   • Marital status: /Civil Union     Spouse name: Not on file   • Number of children: Not on file   • Years of education: Not on file   • Highest education level: Not on file   Occupational History   • Not on file   Tobacco Use   • Smoking status: Never   • Smokeless tobacco: Never   Vaping Use   • Vaping Use: Never used   Substance and Sexual Activity   • Alcohol use: No   • Drug use: No   • Sexual activity: Not Currently     Partners: Male     Birth control/protection: None   Other Topics Concern   • Not on file   Social History Narrative   • Not on file     Social Determinants of Health     Financial Resource Strain: Not on file   Food Insecurity: Not on file   Transportation Needs: Not on file   Physical Activity: Not on file   Stress: Not on file Social Connections: Not on file   Intimate Partner Violence: Not on file   Housing Stability: Not on file       Current Outpatient Medications:   •  Acetaminophen (TYLENOL 8 HOUR PO), Take by mouth as needed  , Disp: , Rfl:   •  Biotin (SM Biotin) 5 MG TABS, Take 5,000 mg by mouth daily, Disp: , Rfl:   •  calcium citrate-vitamin D (CITRACAL+D) 315-200 MG-UNIT per tablet, Take 1 tablet by mouth daily, Disp: , Rfl:   •  carvedilol (COREG) 6.25 mg tablet, , Disp: , Rfl:   •  famotidine (PEPCID) 20 mg tablet, Take 1 tablet (20 mg total) by mouth 2 (two) times a day, Disp: 180 tablet, Rfl: 1  •  fluticasone (FLONASE) 50 mcg/act nasal spray, 1 spray into each nostril as needed , Disp: , Rfl:   •  hydrochlorothiazide (HYDRODIURIL) 12.5 mg tablet, Take 12.5 mg by mouth daily, Disp: , Rfl:   •  ibuprofen (MOTRIN) 200 mg tablet, Take by mouth every 6 (six) hours as needed for mild pain, Disp: , Rfl:   •  lisinopril (ZESTRIL) 40 mg tablet, Take 40 mg by mouth daily, Disp: , Rfl:   •  loratadine (CLARITIN) 10 mg tablet, Take 1 tablet by mouth daily as needed, Disp: , Rfl:   •  multivitamin-minerals (CENTRUM) tablet, Take 1 tablet by mouth daily, Disp: , Rfl:   •  pravastatin (PRAVACHOL) 40 mg tablet, , Disp: , Rfl:   •  carvedilol (COREG) 3.125 mg tablet, Take 3.125 mg by mouth in the morning and 3.125 mg in the evening. Take with meals.  (Patient not taking: Reported on 8/15/2023), Disp: , Rfl:   •  lisinopril-hydrochlorothiazide (PRINZIDE,ZESTORETIC) 20-12.5 MG per tablet, Take 1 tablet by mouth daily (Patient not taking: Reported on 2/1/2023), Disp: 90 tablet, Rfl: 1  •  metoprolol succinate (TOPROL-XL) 50 mg 24 hr tablet, Take 1 tablet (50 mg total) by mouth daily (Patient not taking: Reported on 2/1/2023), Disp: 90 tablet, Rfl: 2  •  pravastatin (PRAVACHOL) 10 mg tablet, Take 40 mg by mouth daily (Patient not taking: Reported on 8/15/2023), Disp: , Rfl:   •  rosuvastatin (CRESTOR) 20 MG tablet, Take 1 tablet (20 mg total) by mouth daily (Patient not taking: Reported on 8/15/2023), Disp: 90 tablet, Rfl: 3  No Known Allergies  Vitals:    08/15/23 1347   BP: 138/74   Pulse: 75   Resp: 16   Temp: (!) 97.2 °F (36.2 °C)   SpO2: 98%       Physical Exam  Constitutional:       General: She is not in acute distress. Appearance: Normal appearance. Cardiovascular:      Rate and Rhythm: Normal rate and regular rhythm. Pulses: Normal pulses. Heart sounds: Normal heart sounds. Pulmonary:      Effort: Pulmonary effort is normal.      Breath sounds: Normal breath sounds. Chest:      Chest wall: No mass. Breasts:     Right: No swelling, bleeding, inverted nipple, mass, nipple discharge, skin change or tenderness. Left: No swelling, bleeding, inverted nipple, mass, nipple discharge, skin change or tenderness. Abdominal:      General: Abdomen is flat. Palpations: Abdomen is soft. Lymphadenopathy:      Upper Body:      Right upper body: No supraclavicular, axillary or pectoral adenopathy. Left upper body: No supraclavicular, axillary or pectoral adenopathy. Skin:     General: Skin is warm. Neurological:      General: No focal deficit present. Mental Status: She is alert and oriented to person, place, and time. Psychiatric:         Mood and Affect: Mood normal.         Behavior: Behavior normal.           Results:    Imaging  No results found. I reviewed the above imaging data. Advance Care Planning/Advance Directives:  Discussed disease status, cancer treatment plans and/or cancer treatment goals with the patient.

## 2023-08-17 ENCOUNTER — OFFICE VISIT (OUTPATIENT)
Dept: URGENT CARE | Facility: MEDICAL CENTER | Age: 73
End: 2023-08-17
Payer: MEDICARE

## 2023-08-17 VITALS
HEART RATE: 73 BPM | TEMPERATURE: 98 F | OXYGEN SATURATION: 97 % | HEIGHT: 62 IN | RESPIRATION RATE: 18 BRPM | DIASTOLIC BLOOD PRESSURE: 82 MMHG | WEIGHT: 145 LBS | SYSTOLIC BLOOD PRESSURE: 150 MMHG | BODY MASS INDEX: 26.68 KG/M2
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
## 2023-08-17 DIAGNOSIS — S80.861A INSECT BITE OF RIGHT LOWER EXTREMITY, INITIAL ENCOUNTER: Primary | ICD-10-CM

## 2023-08-17 PROCEDURE — G0463 HOSPITAL OUTPT CLINIC VISIT: HCPCS | Performed by: PHYSICIAN ASSISTANT

## 2023-08-17 PROCEDURE — 99213 OFFICE O/P EST LOW 20 MIN: CPT | Performed by: PHYSICIAN ASSISTANT

## 2023-08-17 RX ORDER — PREDNISONE 20 MG/1
20 TABLET ORAL DAILY
Qty: 5 TABLET | Refills: 0 | Status: SHIPPED | OUTPATIENT
Start: 2023-08-17 | End: 2023-08-22

## 2023-08-17 NOTE — PROGRESS NOTES
[facility]        NAME: Kiara Cole is a 74 y.o. female  : 1950    MRN: [id number]  DATE: 2023  TIME: 7:15 PM    Assessment and Plan   Insect bite of right lower extremity, initial encounter [N23.980G, W57. XXXA]  1. Insect bite of right lower extremity, initial encounter  predniSONE 20 mg tablet            Patient Instructions     1. Keep skin clean and dry  2. Take Prednisone 20mg daily as needed until resolved  3. Continue Claritin 10mg. Daily  4. Follow up with PCP in 3-5 days if symptoms persist.      Chief Complaint     Chief Complaint   Patient presents with   • Insect Bite     Patient was bite by what she believes are mosquitos on her bilateral legs; using cortizone and benadryl with no relief          History of Present Illness       Alfonzo Connelly is a 49-year-old female who presents with several bug bites on her lower legs that developed over the past 3 days. Patient's been using topical Benadryl and hydrocortisone cream but her symptoms have worsened. She reports she is "constantly itchy" and uncomfortable. Insect Bite  Associated symptoms include a rash. Review of Systems   Review of Systems   Constitutional: Negative. HENT: Negative. Respiratory: Negative. Gastrointestinal: Negative. Skin: Positive for rash. Negative for color change. Current Medications       Current Outpatient Medications:   •  predniSONE 20 mg tablet, Take 1 tablet (20 mg total) by mouth daily for 5 days, Disp: 5 tablet, Rfl: 0  •  Acetaminophen (TYLENOL 8 HOUR PO), Take by mouth as needed  , Disp: , Rfl:   •  Biotin (SM Biotin) 5 MG TABS, Take 5,000 mg by mouth daily, Disp: , Rfl:   •  calcium citrate-vitamin D (CITRACAL+D) 315-200 MG-UNIT per tablet, Take 1 tablet by mouth daily, Disp: , Rfl:   •  carvedilol (COREG) 3.125 mg tablet, Take 3.125 mg by mouth in the morning and 3.125 mg in the evening. Take with meals.  (Patient not taking: Reported on 8/15/2023), Disp: , Rfl:   • carvedilol (COREG) 6.25 mg tablet, , Disp: , Rfl:   •  famotidine (PEPCID) 20 mg tablet, Take 1 tablet (20 mg total) by mouth 2 (two) times a day, Disp: 180 tablet, Rfl: 1  •  fluticasone (FLONASE) 50 mcg/act nasal spray, 1 spray into each nostril as needed , Disp: , Rfl:   •  hydrochlorothiazide (HYDRODIURIL) 12.5 mg tablet, Take 12.5 mg by mouth daily, Disp: , Rfl:   •  ibuprofen (MOTRIN) 200 mg tablet, Take by mouth every 6 (six) hours as needed for mild pain, Disp: , Rfl:   •  lisinopril (ZESTRIL) 40 mg tablet, Take 40 mg by mouth daily, Disp: , Rfl:   •  lisinopril-hydrochlorothiazide (PRINZIDE,ZESTORETIC) 20-12.5 MG per tablet, Take 1 tablet by mouth daily (Patient not taking: Reported on 2/1/2023), Disp: 90 tablet, Rfl: 1  •  loratadine (CLARITIN) 10 mg tablet, Take 1 tablet by mouth daily as needed, Disp: , Rfl:   •  metoprolol succinate (TOPROL-XL) 50 mg 24 hr tablet, Take 1 tablet (50 mg total) by mouth daily (Patient not taking: Reported on 2/1/2023), Disp: 90 tablet, Rfl: 2  •  multivitamin-minerals (CENTRUM) tablet, Take 1 tablet by mouth daily, Disp: , Rfl:   •  pravastatin (PRAVACHOL) 10 mg tablet, Take 40 mg by mouth daily (Patient not taking: Reported on 8/15/2023), Disp: , Rfl:   •  pravastatin (PRAVACHOL) 40 mg tablet, , Disp: , Rfl:   •  rosuvastatin (CRESTOR) 20 MG tablet, Take 1 tablet (20 mg total) by mouth daily (Patient not taking: Reported on 8/15/2023), Disp: 90 tablet, Rfl: 3    Current Allergies     Allergies as of 08/17/2023   • (No Known Allergies)            The following portions of the patient's history were reviewed and updated as appropriate: allergies, current medications, past family history, past medical history, past social history, past surgical history and problem list.     Past Medical History:   Diagnosis Date   • Basal cell carcinoma 2015    Right wrist   • Basal cell carcinoma 09/2020    Right eye/cheek   • Breast cancer ([address]) 12/23/2020    left   • Cancer ([address])     [facility] • Fibrocystic breast disease    • GERD (gastroesophageal reflux disease)    • History of radiation therapy 2021    Left breast WBRT   • Hypercholesterolemia    • Hypertension        Past Surgical History:   Procedure Laterality Date   • BREAST BIOPSY Left 10/23/2020    stereo bx- dcis   • BREAST EXCISIONAL BIOPSY Right     neg- pt does not know when   • BREAST LUMPECTOMY Left 12/23/2020    Procedure: BREAST NEEDLE LOCALIZED LUMPECTOMY (NEEDLE LOC AT 0700); Surgeon: Jose Elias Napier MD;  [facility];  Service: Surgical Oncology   • COLONOSCOPY     • MAMMO NEEDLE LOCALIZATION LEFT (ALL INC) Left 12/23/2020   • MAMMO STEREOTACTIC BREAST BIOPSY LEFT (ALL INC) Left 10/23/2020       Family History   Problem Relation Age of Onset   • Hypertension Mother    • Thyroid disease Mother    • Diabetes Brother    • Kidney failure Brother    • No Known Problems Father    • No Known Problems Daughter    • No Known Problems Maternal Grandmother    • No Known Problems Maternal Grandfather    • No Known Problems Paternal Grandmother    • No Known Problems Paternal Grandfather    • No Known Problems Daughter    • No Known Problems Maternal Aunt    • No Known Problems Maternal Aunt    • No Known Problems Maternal Aunt    • No Known Problems Maternal Aunt    • No Known Problems Son    • No Known Problems Maternal Uncle    • No Known Problems Paternal Uncle    • No Known Problems Brother    • No Known Problems Maternal Uncle    • No Known Problems Paternal Uncle          Medications have been verified. Objective   /82   Pulse 73   Temp 98 °F (36.7 °C) (Temporal)   Resp 18   Ht 5' 2" (1.575 m)   Wt 65.8 kg (145 lb)   SpO2 97%   BMI 26.52 kg/m²   No LMP recorded. Patient is postmenopausal.       Physical Exam     Physical Exam  Constitutional:       General: She is not in acute distress. Appearance: Normal appearance. She is not ill-appearing.    Cardiovascular:      Rate and Rhythm: Normal rate and regular rhythm. Heart sounds: Normal heart sounds. No murmur heard. Pulmonary:      Effort: Pulmonary effort is normal.      Breath sounds: Normal breath sounds. Skin:     Comments: Numerous wheals on both lower legs, no warmth to touch or tenderness to palpation. No active skin drainage. Neurological:      Mental Status: She is alert.

## 2023-08-17 NOTE — PATIENT INSTRUCTIONS
1. Keep skin clean and dry  2. Take Prednisone 20mg daily as needed until resolved  3. Continue Claritin 10mg. Daily  4.  Follow up with PCP in 3-5 days if symptoms persist.

## 2023-10-12 ENCOUNTER — OFFICE VISIT (OUTPATIENT)
Dept: URGENT CARE | Facility: MEDICAL CENTER | Age: 73
End: 2023-10-12
Payer: MEDICARE

## 2023-10-12 VITALS
DIASTOLIC BLOOD PRESSURE: 82 MMHG | TEMPERATURE: 99.7 F | WEIGHT: 145 LBS | OXYGEN SATURATION: 96 % | HEART RATE: 104 BPM | RESPIRATION RATE: 18 BRPM | HEIGHT: 62 IN | BODY MASS INDEX: 26.68 KG/M2 | SYSTOLIC BLOOD PRESSURE: 168 MMHG

## 2023-10-12 DIAGNOSIS — J06.9 UPPER RESPIRATORY TRACT INFECTION, UNSPECIFIED TYPE: Primary | ICD-10-CM

## 2023-10-12 PROCEDURE — G0463 HOSPITAL OUTPT CLINIC VISIT: HCPCS | Performed by: PHYSICIAN ASSISTANT

## 2023-10-12 PROCEDURE — 99213 OFFICE O/P EST LOW 20 MIN: CPT | Performed by: PHYSICIAN ASSISTANT

## 2023-10-12 PROCEDURE — 87636 SARSCOV2 & INF A&B AMP PRB: CPT | Performed by: PHYSICIAN ASSISTANT

## 2023-10-12 RX ORDER — BENZONATATE 200 MG/1
200 CAPSULE ORAL 3 TIMES DAILY PRN
Qty: 20 CAPSULE | Refills: 0 | Status: SHIPPED | OUTPATIENT
Start: 2023-10-12

## 2023-10-12 RX ORDER — AMLODIPINE BESYLATE 5 MG/1
5 TABLET ORAL DAILY
COMMUNITY
Start: 2023-09-26 | End: 2023-10-26

## 2023-10-12 RX ORDER — CLOPIDOGREL BISULFATE 75 MG/1
75 TABLET ORAL DAILY
COMMUNITY
Start: 2023-09-26 | End: 2023-10-14

## 2023-10-12 RX ORDER — ASPIRIN 81 MG/1
81 TABLET, CHEWABLE ORAL DAILY
COMMUNITY
Start: 2023-09-26 | End: 2023-10-26

## 2023-10-12 NOTE — PATIENT INSTRUCTIONS
Increase fluids  2. Tylenol as needed for fever  3. Take tessalon 200mg every 8 hours as needed for cough  4.  Follow up with PCP in 3-5 days if symptoms persist
3.5

## 2023-10-12 NOTE — PROGRESS NOTES
[facility]        NAME: Makenna Ty is a 74 y.o. female  : 1950    MRN: [id number]  DATE: 2023  TIME: 8:48 AM    Assessment and Plan   Upper respiratory tract infection, unspecified type [J06.9]  1. Upper respiratory tract infection, unspecified type  Covid/Flu-Office Collect    benzonatate (TESSALON) 200 MG capsule            Patient Instructions     Increase fluids  2. Tylenol as needed for fever  3. Take tessalon 200mg every 8 hours as needed for cough  4. Follow up with PCP in 3-5 days if symptoms persist      Chief Complaint     Chief Complaint   Patient presents with    Cold Like Symptoms     Pt. With fever, cough, and congestion that began 2 days ago. T-max 100 Home Covid test was negative. History of Present Illness       Shila Porter is a 42-year-old female presents with a 2-day history of malaise, nasal discharge cough and congestion. Patient reports a low-grade fever over the past 24 hours, she denies any GI symptoms including nausea, vomiting or diarrhea. Patient did report chills and body aches at the onset of illness. Home COVID test completed 1 day prior was negative. Review of Systems   Review of Systems   Constitutional:  Positive for chills, fatigue and fever. HENT:  Positive for congestion, postnasal drip and rhinorrhea. Respiratory:  Positive for cough. Negative for chest tightness and shortness of breath. Cardiovascular: Negative. Gastrointestinal: Negative.           Current Medications       Current Outpatient Medications:     Acetaminophen (TYLENOL 8 HOUR PO), Take by mouth as needed  , Disp: , Rfl:     amLODIPine (NORVASC) 5 mg tablet, Take 5 mg by mouth daily, Disp: , Rfl:     aspirin 81 mg chewable tablet, Chew 81 mg daily, Disp: , Rfl:     benzonatate (TESSALON) 200 MG capsule, Take 1 capsule (200 mg total) by mouth 3 (three) times a day as needed for cough, Disp: 20 capsule, Rfl: 0    Biotin (SM Biotin) 5 MG TABS, Take 5,000 mg by mouth daily, Disp: , Rfl:     calcium citrate-vitamin D (CITRACAL+D) 315-200 MG-UNIT per tablet, Take 1 tablet by mouth daily, Disp: , Rfl:     carvedilol (COREG) 6.25 mg tablet, , Disp: , Rfl:     clopidogrel (PLAVIX) 75 mg tablet, Take 75 mg by mouth daily, Disp: , Rfl:     famotidine (PEPCID) 20 mg tablet, Take 1 tablet (20 mg total) by mouth 2 (two) times a day, Disp: 180 tablet, Rfl: 1    fluticasone (FLONASE) 50 mcg/act nasal spray, 1 spray into each nostril as needed , Disp: , Rfl:     hydrochlorothiazide (HYDRODIURIL) 12.5 mg tablet, Take 12.5 mg by mouth daily, Disp: , Rfl:     ibuprofen (MOTRIN) 200 mg tablet, Take by mouth every 6 (six) hours as needed for mild pain, Disp: , Rfl:     lisinopril (ZESTRIL) 40 mg tablet, Take 40 mg by mouth daily, Disp: , Rfl:     loratadine (CLARITIN) 10 mg tablet, Take 1 tablet by mouth daily as needed, Disp: , Rfl:     multivitamin-minerals (CENTRUM) tablet, Take 1 tablet by mouth daily, Disp: , Rfl:     pravastatin (PRAVACHOL) 40 mg tablet, , Disp: , Rfl:     carvedilol (COREG) 3.125 mg tablet, Take 3.125 mg by mouth in the morning and 3.125 mg in the evening. Take with meals.  (Patient not taking: Reported on 8/15/2023), Disp: , Rfl:     lisinopril-hydrochlorothiazide (PRINZIDE,ZESTORETIC) 20-12.5 MG per tablet, Take 1 tablet by mouth daily (Patient not taking: Reported on 2/1/2023), Disp: 90 tablet, Rfl: 1    metoprolol succinate (TOPROL-XL) 50 mg 24 hr tablet, Take 1 tablet (50 mg total) by mouth daily (Patient not taking: Reported on 2/1/2023), Disp: 90 tablet, Rfl: 2    pravastatin (PRAVACHOL) 10 mg tablet, Take 40 mg by mouth daily (Patient not taking: Reported on 8/15/2023), Disp: , Rfl:     rosuvastatin (CRESTOR) 20 MG tablet, Take 1 tablet (20 mg total) by mouth daily (Patient not taking: Reported on 8/15/2023), Disp: 90 tablet, Rfl: 3    Current Allergies     Allergies as of 10/12/2023    (No Known Allergies)            The following portions of the patient's history were reviewed and updated as appropriate: allergies, current medications, past family history, past medical history, past social history, past surgical history and problem list.     Past Medical History:   Diagnosis Date    Basal cell carcinoma 2015    Right wrist    Basal cell carcinoma 09/2020    Right eye/cheek    Breast cancer ([address]) 12/23/2020    left    Cancer ([address])     BCC    Fibrocystic breast disease     GERD (gastroesophageal reflux disease)     History of radiation therapy 2021    Left breast WBRT    Hypercholesterolemia     Hypertension        Past Surgical History:   Procedure Laterality Date    BREAST BIOPSY Left 10/23/2020    stereo bx- dcis    BREAST EXCISIONAL BIOPSY Right     neg- pt does not know when    BREAST LUMPECTOMY Left 12/23/2020    Procedure: BREAST NEEDLE LOCALIZED LUMPECTOMY (NEEDLE LOC AT 0700); Surgeon: Kaleb Gleason MD;  [facility];  Service: Surgical Oncology    COLONOSCOPY      MAMMO NEEDLE LOCALIZATION LEFT (ALL INC) Left 12/23/2020    MAMMO STEREOTACTIC BREAST BIOPSY LEFT (ALL INC) Left 10/23/2020    US GUIDED THYROID BIOPSY  4/14/2022       Family History   Problem Relation Age of Onset    Hypertension Mother     Thyroid disease Mother     Diabetes Brother     Kidney failure Brother     No Known Problems Father     No Known Problems Daughter     No Known Problems Maternal Grandmother     No Known Problems Maternal Grandfather     No Known Problems Paternal Grandmother     No Known Problems Paternal Grandfather     No Known Problems Daughter     No Known Problems Maternal Aunt     No Known Problems Maternal Aunt     No Known Problems Maternal Aunt     No Known Problems Maternal Aunt     No Known Problems Son     No Known Problems Maternal Uncle     No Known Problems Paternal Uncle     No Known Problems Brother     No Known Problems Maternal Uncle     No Known Problems Paternal Uncle          Medications have been verified.         Objective   /82 Comment: Manual BP. Provider made aware. Pulse 104   Temp 99.7 °F (37.6 °C)   Resp 18   Ht 5' 2" (1.575 m)   Wt 65.8 kg (145 lb)   SpO2 96%   BMI 26.52 kg/m²   No LMP recorded. Patient is postmenopausal.       Physical Exam     Physical Exam  Constitutional:       General: She is not in acute distress. Appearance: Normal appearance. She is not ill-appearing. HENT:      Head: Normocephalic and atraumatic. Right Ear: Tympanic membrane normal.      Left Ear: Tympanic membrane normal.      Nose: Mucosal edema, congestion and rhinorrhea present. Rhinorrhea is clear. Mouth/Throat:      Lips: Pink. Pharynx: Oropharynx is clear. Cardiovascular:      Rate and Rhythm: Normal rate and regular rhythm. Heart sounds: Normal heart sounds, S1 normal and S2 normal. No murmur heard. Pulmonary:      Effort: Pulmonary effort is normal.      Breath sounds: Normal breath sounds and air entry. Neurological:      Mental Status: She is alert.

## 2023-10-13 LAB
FLUAV RNA RESP QL NAA+PROBE: NEGATIVE
FLUBV RNA RESP QL NAA+PROBE: NEGATIVE
SARS-COV-2 RNA RESP QL NAA+PROBE: NEGATIVE

## 2023-10-25 ENCOUNTER — HOSPITAL ENCOUNTER (OUTPATIENT)
Dept: RADIOLOGY | Facility: HOSPITAL | Age: 73
Discharge: HOME | End: 2023-10-25
Payer: MEDICARE

## 2023-10-25 DIAGNOSIS — Z85.3 HISTORY OF BREAST CANCER: ICD-10-CM

## 2023-10-25 DIAGNOSIS — R92.8 ABNORMAL MAMMOGRAM: ICD-10-CM

## 2023-10-25 PROCEDURE — G0279 TOMOSYNTHESIS, MAMMO: HCPCS

## 2023-10-25 PROCEDURE — 77066 DX MAMMO INCL CAD BI: CPT

## 2023-10-25 PROCEDURE — 76642 ULTRASOUND BREAST LIMITED: CPT

## 2024-01-11 ENCOUNTER — CLINICAL SUPPORT (OUTPATIENT)
Dept: RADIATION ONCOLOGY | Facility: HOSPITAL | Age: 74
End: 2024-01-11
Attending: INTERNAL MEDICINE
Payer: MEDICARE

## 2024-01-11 ENCOUNTER — APPOINTMENT (OUTPATIENT)
Dept: RADIATION ONCOLOGY | Facility: HOSPITAL | Age: 74
End: 2024-01-11
Payer: MEDICARE

## 2024-01-11 VITALS
OXYGEN SATURATION: 98 % | RESPIRATION RATE: 18 BRPM | WEIGHT: 147.6 LBS | SYSTOLIC BLOOD PRESSURE: 138 MMHG | BODY MASS INDEX: 27 KG/M2 | HEART RATE: 91 BPM | DIASTOLIC BLOOD PRESSURE: 80 MMHG | TEMPERATURE: 97.6 F

## 2024-01-11 DIAGNOSIS — Z86.000 HISTORY OF DUCTAL CARCINOMA IN SITU (DCIS) OF BREAST: Primary | ICD-10-CM

## 2024-01-11 DIAGNOSIS — C44.91 BASAL CELL CARCINOMA (BCC), UNSPECIFIED SITE: Primary | ICD-10-CM

## 2024-01-11 PROCEDURE — 99213 OFFICE O/P EST LOW 20 MIN: CPT | Performed by: INTERNAL MEDICINE

## 2024-01-11 PROCEDURE — 99211 OFF/OP EST MAY X REQ PHY/QHP: CPT | Performed by: INTERNAL MEDICINE

## 2024-01-11 NOTE — PROGRESS NOTES
Follow-up - Radiation Oncology   Cynthia Burt 1950 73 y.o. female [id number]    Assessment/Plan:  Cynthia Burt is a 73 y.o. female prior patient of Dr. Wall, transferred due to change in provider clinic location, who was treated adjuvantly for DCIS completing on 4/20/2021.    She returns for routine follow-up now nearly 3 years after completion of treatment.  Her latest mammogram shows no evidence of disease, and her breast exam is benign as well.  She has no late toxicity related to treatment.  Continue surveillance  Continue surgical oncology follow-up  RTC in 1 year or as needed, she prefers to return in 1 year.    Josie Ellsworth MD  Department of Radiation Oncology  [facility]    Total Time Spent  20 minutes spent reviewing EMR in preparation for visit, with the patient, coordination with other providers, and documentation.    No orders of the defined types were placed in this encounter.       History of Present Illness   Interval History:  Cynthia Burt 1950 is a 73 y.o. 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 Her tumor is ERPR positive.  She status post breast conservation surgery.  She has a positive posterior margin at the chest wall. She completed a course of adjuvant radiation therapy to the left breast on 4/20/21. Dr. Orona did not recommend adjuvant hormonal therapy. The pt was last seen in radiation on 1/19/23. She returns today for yearly follow up.      2/1/23 Surgical Oncology - [facility]  No concerns on CBE.  Bilateral diagnostic mammogram on 10/20/2022 which was BI-RADS 2 category 2 density        8/15/23 Surgical Oncology - [facility]  No concerns on CBE  Scheduled for mammo in Oct.        10/25/23  Mammo diagnostic bilateral w 3d & cad  US breast right limited (diagnostic)  FINDINGS:   LEFT  1) POST-SURGICAL FINDING [B]  Mammo diagnostic bilateral w 3d & cad: There are post-surgical findings from a previous lumpectomy with radiation  seen in the outer central region of the left breast in the anterior depth. Compared to the previous study, there are no significant changes.   RIGHT  2) ASYMMETRY [C]  Mammo diagnostic bilateral w 3d & cad: There is an asymmetry seen in the outer region of the right breast in the middle depth on the CC view.  Well-circumscribed subcentimeter nodule in the retroareolar breast is stable.  US breast right limited (diagnostic): There is a 6 mm x 3 mm x 5 mm oval simple cyst with circumscribed margins seen in the right breast at 9 o'clock, 2 cm from the nipple. The cyst correlates with the prior mammogram finding.  No further surveillance required.  A second simple cyst in the retroareolar breast corresponds to the second mammographic finding described above.  IMPRESSION:  Stable therapeutic changes left breast.  No evidence for malignancy.  Benign cyst 9:00 right breast a correlate to the breast asymmetry.   ASSESSMENT/BI-RADS CATEGORY:  Left: 2 - Benign  Right: 2 - Benign  Overall: 2 - Benign     RECOMMENDATION:       - Diagnostic mammogram in 1 year for both breasts.     Upcomin24 Surgical Oncology     She has no breast complaints.     Historical Information   Oncology History   Basal cell carcinoma of skin   10/3/2013 Initial Diagnosis    Basal cell carcinoma of skin     History of ductal carcinoma in situ (DCIS) of breast   10/23/2020 Biopsy    Left breast stereotactic biopsy  5 o'clock; middle portion  Ductal carcinoma in situ  Grade 2    ME 90    Malignancy appears unifocal. Calcifications cover 1 cm. US left axilla not performed. RIght breast clear.     2020 Surgery    Left breast needle localized lumpectomy  Ductal carcinoma in situ  Grade 2  Posterior margin positive for DCIS   Stage 0     2021 - 2021 Hormone Therapy    Consult with Dr. Orona  Hormonal therapy not recommended     3/4/2021 - 2021 Radiation    Course: C1    Plan ID Energy Fractions Dose per Fraction (cGy)  Dose Correction (cGy) Total Dose Delivered (cGy) Elapsed Days   BH L [id number] 6X 25 / 25 200 0 5,000 35   BH L Breast e 12E 8 / 8 200 0 1,600 11    Dr Wall        Past Medical History:   Diagnosis Date   • Basal cell carcinoma 2015    Right wrist   • Basal cell carcinoma 09/2020    Right eye/cheek   • Breast cancer (HCC) 12/23/2020    left   • Cancer (HCC)     BCC   • Fibrocystic breast disease    • GERD (gastroesophageal reflux disease)    • History of radiation therapy 2021    Left breast WBRT   • Hypercholesterolemia    • Hypertension      Past Surgical History:   Procedure Laterality Date   • BREAST BIOPSY Left 10/23/2020    stereo bx- dcis   • BREAST EXCISIONAL BIOPSY Right     neg- pt does not know when   • BREAST LUMPECTOMY Left 12/23/2020    Procedure: BREAST NEEDLE LOCALIZED LUMPECTOMY (NEEDLE LOC AT 0700);  Surgeon: Trey Bennett MD;  Location: [facility];  Service: Surgical Oncology   • COLONOSCOPY     • MAMMO NEEDLE LOCALIZATION LEFT (ALL INC) Left 12/23/2020   • MAMMO STEREOTACTIC BREAST BIOPSY LEFT (ALL INC) Left 10/23/2020   • US GUIDED THYROID BIOPSY  4/14/2022     Social History   Social History     Substance and Sexual Activity   Alcohol Use No     Social History     Substance and Sexual Activity   Drug Use No     Social History     Tobacco Use   Smoking Status Never   Smokeless Tobacco Never       Meds/Allergies     Current Outpatient Medications:   •  Acetaminophen (TYLENOL 8 HOUR PO), Take by mouth as needed  , Disp: , Rfl:   •  amLODIPine (NORVASC) 5 mg tablet, Take 2.5 mg by mouth daily, Disp: , Rfl:   •  Biotin (SM Biotin) 5 MG TABS, Take 5,000 mg by mouth daily, Disp: , Rfl:   •  calcium citrate-vitamin D (CITRACAL+D) 315-200 MG-UNIT per tablet, Take 1 tablet by mouth daily, Disp: , Rfl:   •  carvedilol (COREG) 6.25 mg tablet, , Disp: , Rfl:   •  famotidine (PEPCID) 20 mg tablet, Take 1 tablet (20 mg total) by mouth 2 (two) times a day, Disp: 180 tablet, Rfl: 1  •  fluticasone (FLONASE) 50  mcg/act nasal spray, 1 spray into each nostril as needed , Disp: , Rfl:   •  hydrochlorothiazide (HYDRODIURIL) 12.5 mg tablet, Take 12.5 mg by mouth daily, Disp: , Rfl:   •  ibuprofen (MOTRIN) 200 mg tablet, Take by mouth every 6 (six) hours as needed for mild pain, Disp: , Rfl:   •  lisinopril (ZESTRIL) 40 mg tablet, Take 40 mg by mouth daily, Disp: , Rfl:   •  loratadine (CLARITIN) 10 mg tablet, Take 1 tablet by mouth daily as needed, Disp: , Rfl:   •  multivitamin-minerals (CENTRUM) tablet, Take 1 tablet by mouth daily, Disp: , Rfl:   •  pravastatin (PRAVACHOL) 40 mg tablet, , Disp: , Rfl:   •  carvedilol (COREG) 3.125 mg tablet, Take 3.125 mg by mouth in the morning and 3.125 mg in the evening. Take with meals. (Patient not taking: Reported on 1/11/2024), Disp: , Rfl:   •  pravastatin (PRAVACHOL) 10 mg tablet, Take 40 mg by mouth daily (Patient not taking: Reported on 8/15/2023), Disp: , Rfl:   No Known Allergies    Review of Systems:  Refer to nursing note    OBJECTIVE:   /80 (BP Location: Left arm)   Pulse 91   Temp 97.6 °F (36.4 °C) (Temporal)   Resp 18   Wt 67 kg (147 lb 9.6 oz)   SpO2 98%   BMI 27.00 kg/m²     Physical Exam:   General Appearance:  Alert, cooperative, no distress, appears stated age  Breast Exam:  Left  Breast: No residual dyspigmentation in the irradiated field. Some fibrosis, no edema. No palpable abnormalities in the breast, axilla, or supraclavicular region. Mild redness near the clavicle perhaps due to excoriation.   Right Breast: Benign exam. No palpable abnormalities in the breast, axilla, or supraclavicular region  Exam chaperoned by female nursing staff.  Lungs: Respirations unlabored, no cyanosis, able to speak in complete sentences without dyspnea.  Extremities: No cyanosis or edema  Skin: No generalized rash or dermatitis  Neurologic: ANOx3, speech and cognition intact.    Portions of the record may have been created with voice recognition software.  Occasional wrong  "word or \"sound a like\" substitutions may have occurred due to the inherent limitations of voice recognition software.  Read the chart carefully and recognize, using context, where substitutions have occurred.  "

## 2024-01-11 NOTE — PROGRESS NOTES
Cynthia Burt 1950 is a 73 y.o. 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 Her tumor is ERPR positive.  She status post breast conservation surgery.  She has a positive posterior margin at the chest wall. She completed a course of adjuvant radiation therapy to the left breast on 21. Dr. Orona did not recommend adjuvant hormonal therapy. The pt was last seen in radiation on 23. She returns today for yearly follow up.      23 Surgical Oncology - [facility]  No concerns on CBE.  Bilateral diagnostic mammogram on 10/20/2022 which was BI-RADS 2 category 2 density       8/15/23 Surgical Oncology - [facility]  No concerns on CBE  Scheduled for mammo in Oct.      10/25/23  Mammo diagnostic bilateral w 3d & cad  US breast right limited (diagnostic)  FINDINGS:   LEFT  1) POST-SURGICAL FINDING [B]  Mammo diagnostic bilateral w 3d & cad: There are post-surgical findings from a previous lumpectomy with radiation seen in the outer central region of the left breast in the anterior depth. Compared to the previous study, there are no significant changes.   RIGHT  2) ASYMMETRY [C]  Mammo diagnostic bilateral w 3d & cad: There is an asymmetry seen in the outer region of the right breast in the middle depth on the CC view.  Well-circumscribed subcentimeter nodule in the retroareolar breast is stable.  US breast right limited (diagnostic): There is a 6 mm x 3 mm x 5 mm oval simple cyst with circumscribed margins seen in the right breast at 9 o'clock, 2 cm from the nipple. The cyst correlates with the prior mammogram finding.  No further surveillance required.  A second simple cyst in the retroareolar breast corresponds to the second mammographic finding described above.  IMPRESSION:  Stable therapeutic changes left breast.  No evidence for malignancy.  Benign cyst 9:00 right breast a correlate to the breast asymmetry.      Upcomin24 Surgical Oncology          Oncology History   Basal cell  carcinoma of skin   10/3/2013 Initial Diagnosis    Basal cell carcinoma of skin     History of ductal carcinoma in situ (DCIS) of breast   10/23/2020 Biopsy    Left breast stereotactic biopsy  5 o'clock; middle portion  Ductal carcinoma in situ  Grade 2    AZ 90    Malignancy appears unifocal. Calcifications cover 1 cm. US left axilla not performed. RIght breast clear.     12/23/2020 Surgery    Left breast needle localized lumpectomy  Ductal carcinoma in situ  Grade 2  Posterior margin positive for DCIS   Stage 0     2/16/2021 - 2/16/2021 Hormone Therapy    Consult with Dr. Orona  Hormonal therapy not recommended     3/4/2021 - 4/20/2021 Radiation    Course: C1    Plan ID Energy Fractions Dose per Fraction (cGy) Dose Correction (cGy) Total Dose Delivered (cGy) Elapsed Days   BH L BreaEZ0 6X 25 / 25 200 0 5,000 35   BH L Breast e 12E 8 / 8 200 0 1,600 11    Dr Wall          Review of Systems:  Review of Systems   Constitutional:  Positive for fatigue (mild, doesn't sleep well at night).   HENT: Negative.     Eyes: Negative.    Respiratory: Negative.     Cardiovascular: Negative.    Gastrointestinal: Negative.    Endocrine: Negative.    Genitourinary: Negative.    Musculoskeletal:  Positive for arthralgias (generalized, knees), neck pain and neck stiffness.   Skin: Negative.    Allergic/Immunologic: Negative.    Neurological: Negative.    Hematological: Negative.    Psychiatric/Behavioral:  Positive for sleep disturbance.        Clinical Trial: no      Health Maintenance   Topic Date Due    Fall Risk  10/18/2022    Urinary Incontinence Screening  10/18/2022    Medicare Annual Wellness Visit (AWV)  10/18/2022    BMI: Followup Plan  10/18/2022    COVID-19 Vaccine (3 - 2023-24 season) 09/01/2023    Colorectal Cancer Screening  09/27/2024    BMI: Adult  10/12/2024    Breast Cancer Screening: Mammogram  10/25/2024    Depression Screening  01/11/2025    Hepatitis C Screening  Completed    Osteoporosis Screening   Completed    Zoster Vaccine  Completed    Pneumococcal Vaccine: 65+ Years  Completed    Influenza Vaccine  Completed    HIB Vaccine  Aged Out    IPV Vaccine  Aged Out    Hepatitis A Vaccine  Aged Out    Meningococcal ACWY Vaccine  Aged Out    HPV Vaccine  Aged Out     Patient Active Problem List   Diagnosis    Basal cell carcinoma of skin    Benign essential hypertension    Mixed hyperlipidemia    GERD without esophagitis    Osteoarthritis of knee    Osteopenia of multiple sites    Overweight with body mass index (BMI) of 27 to 27.9 in adult    IFG (impaired fasting glucose)    History of ductal carcinoma in situ (DCIS) of breast    Plantar fasciitis    Greater trochanteric bursitis of left hip     Past Medical History:   Diagnosis Date    Basal cell carcinoma 2015    Right wrist    Basal cell carcinoma 09/2020    Right eye/cheek    Breast cancer (HCC) 12/23/2020    left    Cancer (HCC)     BCC    Fibrocystic breast disease     GERD (gastroesophageal reflux disease)     History of radiation therapy 2021    Left breast WBRT    Hypercholesterolemia     Hypertension      Past Surgical History:   Procedure Laterality Date    BREAST BIOPSY Left 10/23/2020    stereo bx- dcis    BREAST EXCISIONAL BIOPSY Right     neg- pt does not know when    BREAST LUMPECTOMY Left 12/23/2020    Procedure: BREAST NEEDLE LOCALIZED LUMPECTOMY (NEEDLE LOC AT 0700);  Surgeon: Trey Bennett MD;  [facility];  Service: Surgical Oncology    COLONOSCOPY      MAMMO NEEDLE LOCALIZATION LEFT (ALL INC) Left 12/23/2020    MAMMO STEREOTACTIC BREAST BIOPSY LEFT (ALL INC) Left 10/23/2020    US GUIDED THYROID BIOPSY  4/14/2022     Family History   Problem Relation Age of Onset    Hypertension Mother     Thyroid disease Mother     Diabetes Brother     Kidney failure Brother     No Known Problems Father     No Known Problems Daughter     No Known Problems Maternal Grandmother     No Known Problems Maternal Grandfather     No Known Problems Paternal  Grandmother     No Known Problems Paternal Grandfather     No Known Problems Daughter     No Known Problems Maternal Aunt     No Known Problems Maternal Aunt     No Known Problems Maternal Aunt     No Known Problems Maternal Aunt     No Known Problems Son     No Known Problems Maternal Uncle     No Known Problems Paternal Uncle     No Known Problems Brother     No Known Problems Maternal Uncle     No Known Problems Paternal Uncle      Social History     Socioeconomic History    Marital status: /Civil Union     Spouse name: Not on file    Number of children: Not on file    Years of education: Not on file    Highest education level: Not on file   Occupational History    Not on file   Tobacco Use    Smoking status: Never    Smokeless tobacco: Never   Vaping Use    Vaping status: Never Used   Substance and Sexual Activity    Alcohol use: No    Drug use: No    Sexual activity: Not Currently     Partners: Male     Birth control/protection: None   Other Topics Concern    Not on file   Social History Narrative    Not on file     Social Determinants of Health     Financial Resource Strain: Low Risk  (9/22/2023)    Received from [facility]    Overall Financial Resource Strain (CARDIA)     Difficulty of Paying Living Expenses: Not very hard   Food Insecurity: No Food Insecurity (9/22/2023)    Received from [facility]    Hunger Vital Sign     Worried About Running Out of Food in the Last Year: Never true     Ran Out of Food in the Last Year: Never true   Transportation Needs: No Transportation Needs (9/22/2023)    Received from [facility]    PRAPARE - Transportation     Lack of Transportation (Medical): No     Lack of Transportation (Non-Medical): No   Physical Activity: Not on file   Stress: Not on file   Social Connections: Not on file   Intimate Partner Violence: Unknown (9/22/2023)    Received from [facility]    Humiliation, Afraid, Rape, and  Kick questionnaire     Fear of Current or Ex-Partner: Patient refused     Emotionally Abused: Patient refused     Physically Abused: Patient refused     Sexually Abused: Patient refused   Housing Stability: Low Risk  (9/22/2023)    Received from [facility]    Housing Stability Vital Sign     Unable to Pay for Housing in the Last Year: No     Number of Places Lived in the Last Year: 1     Unstable Housing in the Last Year: No       Current Outpatient Medications:     Acetaminophen (TYLENOL 8 HOUR PO), Take by mouth as needed  , Disp: , Rfl:     amLODIPine (NORVASC) 5 mg tablet, Take 2.5 mg by mouth daily, Disp: , Rfl:     Biotin (SM Biotin) 5 MG TABS, Take 5,000 mg by mouth daily, Disp: , Rfl:     calcium citrate-vitamin D (CITRACAL+D) 315-200 MG-UNIT per tablet, Take 1 tablet by mouth daily, Disp: , Rfl:     carvedilol (COREG) 6.25 mg tablet, , Disp: , Rfl:     famotidine (PEPCID) 20 mg tablet, Take 1 tablet (20 mg total) by mouth 2 (two) times a day, Disp: 180 tablet, Rfl: 1    fluticasone (FLONASE) 50 mcg/act nasal spray, 1 spray into each nostril as needed , Disp: , Rfl:     hydrochlorothiazide (HYDRODIURIL) 12.5 mg tablet, Take 12.5 mg by mouth daily, Disp: , Rfl:     ibuprofen (MOTRIN) 200 mg tablet, Take by mouth every 6 (six) hours as needed for mild pain, Disp: , Rfl:     lisinopril (ZESTRIL) 40 mg tablet, Take 40 mg by mouth daily, Disp: , Rfl:     loratadine (CLARITIN) 10 mg tablet, Take 1 tablet by mouth daily as needed, Disp: , Rfl:     multivitamin-minerals (CENTRUM) tablet, Take 1 tablet by mouth daily, Disp: , Rfl:     pravastatin (PRAVACHOL) 40 mg tablet, , Disp: , Rfl:     carvedilol (COREG) 3.125 mg tablet, Take 3.125 mg by mouth in the morning and 3.125 mg in the evening. Take with meals. (Patient not taking: Reported on 1/11/2024), Disp: , Rfl:     pravastatin (PRAVACHOL) 10 mg tablet, Take 40 mg by mouth daily (Patient not taking: Reported on 8/15/2023), Disp: , Rfl:   No Known  Allergies  Vitals:    01/11/24 1443   BP: 138/80   BP Location: Left arm   Pulse: 91   Resp: 18   Temp: 97.6 °F (36.4 °C)   TempSrc: Temporal   SpO2: 98%   Weight: 67 kg (147 lb 9.6 oz)

## 2024-01-18 ENCOUNTER — APPOINTMENT (OUTPATIENT)
Dept: RADIATION ONCOLOGY | Facility: HOSPITAL | Age: 74
End: 2024-01-18
Payer: MEDICARE

## 2024-02-05 NOTE — PROGRESS NOTES
Surgical Oncology Follow Up       [address]  [facility] [place]  [address]  [place] [address]    Gail Burt  1950  [id number]  [address] [facility]  [facility] [place]  [address] [place] [address]    No chief complaint on file  Assessment/Plan:  1  History of ductal carcinoma in situ (DCIS) of breast  - 6 month follow up  - Mammo diagnostic bilateral w 3d & cad; Future       Discussion/Summary:  Patient is a 66year old female presenting today for six-month follow-up for left breast cancer diagnosed in October 2020  Pathology revealed DCIS ER/MA positive  Underwent a left breast needle localized lumpectomy with Dr Ladi Nicholson she completed whole breast radiation therapy  She met with Medical Oncology however hormonal therapy was not recommended  She is currently under surveillance  She is due for a bilateral diagnostic mammogram in October of this year so an order has been placed  There are no concerns on patient's clinical breast exam   She does note tenderness on palpation of the left breast which explained to her is normal post surgery and radiation  She has some left axilla tightness which I recommended to continue stretching at home  I will see the patient back in 6 months or sooner should the need arise  She was instructed to call with any questions or concerns prior to this time  All questions were answered today  History of Present Illness:     Oncology History   Basal cell carcinoma of skin   10/3/2013 Initial Diagnosis    Basal cell carcinoma of skin     History of ductal carcinoma in situ (DCIS) of breast   10/23/2020 Biopsy    Left breast stereotactic biopsy  5 o'clock; middle portion  Ductal carcinoma in situ  Grade 2    MA 90    Malignancy appears unifocal  Calcifications cover 1 cm  US left axilla not performed  RIght breast clear       12/23/2020 Surgery    Left breast needle localized lumpectomy  Ductal carcinoma in situ  Grade 2  Posterior margin positive for DCIS   Stage 0     2/16/2021 - 2/16/2021 Hormone Therapy    Consult with Dr Katja Guaman  Hormonal therapy not recommended     3/4/2021 - 4/20/2021 Radiation    Course: C1    Plan ID Energy Fractions Dose per Fraction (cGy) Dose Correction (cGy) Total Dose Delivered (cGy) Elapsed Days   BH L [id number] 6X 25 / 25 200 0 5,000 35   BH L Breast e 12E 8 / 8 200 0 1,600 11     [facility]           -Interval History:  Patient is a 66year old female presenting today for six-month follow-up for left breast cancer diagnosed in October 2020  She is currently under surveillance  Patient denies changes on her breast exam  She denies persistent headaches, bone pain, back pain, shortness of breath, or abdominal pain  Review of Systems:  Review of Systems   Constitutional: Negative for activity change, appetite change, fatigue and unexpected weight change  Respiratory: Negative for cough and shortness of breath  Cardiovascular: Negative for chest pain  Gastrointestinal: Negative for abdominal pain, diarrhea, nausea and vomiting  Endocrine: Negative for heat intolerance  Musculoskeletal: Negative for arthralgias, back pain and myalgias  Skin: Negative for rash  Neurological: Negative for weakness and headaches  Hematological: Negative for adenopathy         Patient Active Problem List   Diagnosis    Basal cell carcinoma of skin    Benign essential hypertension    Mixed hyperlipidemia    GERD without esophagitis    Osteoarthritis of knee    Osteopenia of multiple sites    Overweight with body mass index (BMI) of 27 to 27 9 in adult    IFG (impaired fasting glucose)    History of ductal carcinoma in situ (DCIS) of breast    Plantar fasciitis    Greater trochanteric bursitis of left hip     Past Medical History:   Diagnosis Date    Basal cell carcinoma 2015    Right wrist    Basal cell carcinoma 09/2020    Right eye/cheek    Breast cancer ([facility] [address] ) 12/23/2020    left    Cancer ([facility] [address] )     BCC    Fibrocystic breast disease     GERD (gastroesophageal reflux disease)     History of radiation therapy 2021    Left breast WBRT    Hypercholesterolemia     Hypertension      Past Surgical History:   Procedure Laterality Date    BREAST EXCISIONAL BIOPSY Right     BREAST LUMPECTOMY Left 12/23/2020    Procedure: BREAST NEEDLE LOCALIZED LUMPECTOMY (NEEDLE LOC AT 0700);   Surgeon: Janet Bradley MD;  [facility];  Service: Surgical Oncology    COLONOSCOPY      MAMMO NEEDLE LOCALIZATION LEFT (ALL INC) Left 12/23/2020    MAMMO STEREOTACTIC BREAST BIOPSY LEFT (ALL INC) Left 10/23/2020     Family History   Problem Relation Age of Onset    Hypertension Mother     Thyroid disease Mother     Diabetes Brother     Kidney failure Brother     No Known Problems Father     No Known Problems Daughter     No Known Problems Maternal Grandmother     No Known Problems Maternal Grandfather     No Known Problems Paternal Grandmother     No Known Problems Paternal Grandfather     No Known Problems Daughter     No Known Problems Maternal Aunt     No Known Problems Maternal Aunt     No Known Problems Maternal Aunt     No Known Problems Maternal Aunt     No Known Problems Son     No Known Problems Maternal Uncle     No Known Problems Paternal Uncle     No Known Problems Brother     No Known Problems Maternal Uncle     No Known Problems Paternal Uncle      Social History     Socioeconomic History    Marital status: /Civil Union     Spouse name: Not on file    Number of children: Not on file    Years of education: Not on file    Highest education level: Not on file   Occupational History    Not on file   Tobacco Use    Smoking status: Never Smoker    Smokeless tobacco: Never Used   Vaping Use    Vaping Use: Never used   Substance and Sexual Activity    Alcohol use: No    Drug use: No    Sexual activity: Not Currently     Partners: Male     Birth control/protection: None   Other Topics Concern    Not on file   Social History Narrative    Not on file     Social Determinants of Health     Financial Resource Strain: Not on file   Food Insecurity: Not on file   Transportation Needs: Not on file   Physical Activity: Not on file   Stress: Not on file   Social Connections: Not on file   Intimate Partner Violence: Not on file   Housing Stability: Not on file       Current Outpatient Medications:     Acetaminophen (TYLENOL 8 HOUR PO), Take by mouth as needed  , Disp: , Rfl:     calcium citrate-vitamin D (CITRACAL+D) 315-200 MG-UNIT per tablet, Take 1 tablet by mouth daily, Disp: , Rfl:     famotidine (PEPCID) 20 mg tablet, Take 1 tablet (20 mg total) by mouth 2 (two) times a day, Disp: 180 tablet, Rfl: 1    fluticasone (FLONASE) 50 mcg/act nasal spray, 1 spray into each nostril as needed , Disp: , Rfl:     ibuprofen (MOTRIN) 200 mg tablet, Take by mouth every 6 (six) hours as needed for mild pain, Disp: , Rfl:     lisinopril-hydrochlorothiazide (PRINZIDE,ZESTORETIC) 20-12 5 MG per tablet, Take 1 tablet by mouth daily, Disp: 90 tablet, Rfl: 1    loratadine (CLARITIN) 10 mg tablet, Take 1 tablet by mouth daily as needed, Disp: , Rfl:     metoprolol succinate (TOPROL-XL) 50 mg 24 hr tablet, Take 1 tablet (50 mg total) by mouth daily, Disp: 90 tablet, Rfl: 2    multivitamin-minerals (CENTRUM) tablet, Take 1 tablet by mouth daily, Disp: , Rfl:     rosuvastatin (CRESTOR) 20 MG tablet, Take 1 tablet (20 mg total) by mouth daily (Patient taking differently: Take 20 mg by mouth every other day ), Disp: 90 tablet, Rfl: 3  No Known Allergies  There were no vitals filed for this visit  Physical Exam  Constitutional:       General: She is not in acute distress  Appearance: Normal appearance  Cardiovascular:      Rate and Rhythm: Normal rate and regular rhythm  Pulses: Normal pulses        Heart sounds: Normal heart sounds  Pulmonary:      Effort: Pulmonary effort is normal       Breath sounds: Normal breath sounds  Chest:      Chest wall: No mass  Breasts:      Right: No swelling, bleeding, inverted nipple, mass, nipple discharge, skin change, tenderness, axillary adenopathy or supraclavicular adenopathy  Left: Skin change and tenderness present  No swelling, bleeding, inverted nipple, mass, nipple discharge, axillary adenopathy or supraclavicular adenopathy  Comments: Left breast lumpectomy scar  Moderate scar tissue posterior to incision  Hyperpigmentation of the skin and deformity of the central left breast  Left breast tender on palpation  No masses, nodularity, skin changes, nipple changes or discharge, or adenopathy appreciated on physical exam      Abdominal:      General: Abdomen is flat  Palpations: Abdomen is soft  Lymphadenopathy:      Upper Body:      Right upper body: No supraclavicular, axillary or pectoral adenopathy  Left upper body: No supraclavicular, axillary or pectoral adenopathy  Skin:     General: Skin is warm  Neurological:      General: No focal deficit present  Mental Status: She is alert and oriented to person, place, and time  Psychiatric:         Mood and Affect: Mood normal          Behavior: Behavior normal            Results:    Imaging  No results found  I reviewed the above imaging data  Advance Care Planning/Advance Directives:  Discussed disease status, cancer treatment plans and/or cancer treatment goals with the patient  Regular Diet - No restrictions

## 2024-02-19 ENCOUNTER — OFFICE VISIT (OUTPATIENT)
Dept: SURGICAL ONCOLOGY | Facility: CLINIC | Age: 74
End: 2024-02-19
Payer: MEDICARE

## 2024-02-19 VITALS
SYSTOLIC BLOOD PRESSURE: 146 MMHG | BODY MASS INDEX: 27.23 KG/M2 | DIASTOLIC BLOOD PRESSURE: 72 MMHG | HEART RATE: 86 BPM | HEIGHT: 62 IN | RESPIRATION RATE: 16 BRPM | TEMPERATURE: 98 F | OXYGEN SATURATION: 97 % | WEIGHT: 148 LBS

## 2024-02-19 DIAGNOSIS — Z86.000 HISTORY OF DUCTAL CARCINOMA IN SITU (DCIS) OF BREAST: Primary | ICD-10-CM

## 2024-02-19 PROCEDURE — 99213 OFFICE O/P EST LOW 20 MIN: CPT

## 2024-02-19 RX ORDER — AMLODIPINE BESYLATE 2.5 MG/1
2.5 TABLET ORAL DAILY
COMMUNITY

## 2024-02-19 NOTE — PROGRESS NOTES
Surgical Oncology Follow Up       [address] [facility] [place]  [address] [facility] [address]    Cynthia Burt  1950  [id number]  [address] [facility] [place]  [address] [facility]  [facility] [address]    Chief Complaint   Patient presents with   • office visit       Assessment/Plan:  1. History of ductal carcinoma in situ (DCIS) of breast  - 6 mo follow up       Discussion/Summary: Patient is a 73 year old female presenting today for six-month follow-up for left breast cancer diagnosed in October 2020. Pathology revealed DCIS ER/IN positive. She underwent a left breast lumpectomy with Dr. Bennett and completed whole breast radiation therapy. Hormonal therapy was not recommended. She is on obs. She had a dx mammogram and ultrasound of the right breast on 10/25 which was BIRADS 2 category 3 density.  There were no concerns on her cbe. I will see the patient back in 6 months or sooner should the need arise. She was instructed to call with any questions or concerns prior to this time. All questions were answered today.     History of Present Illness:     Oncology History   Basal cell carcinoma of skin   10/3/2013 Initial Diagnosis    Basal cell carcinoma of skin     History of ductal carcinoma in situ (DCIS) of breast   10/23/2020 Biopsy    Left breast stereotactic biopsy  5 o'clock; middle portion  Ductal carcinoma in situ  Grade 2    IN 90    Malignancy appears unifocal. Calcifications cover 1 cm. US left axilla not performed. RIght breast clear.     12/23/2020 Surgery    Left breast needle localized lumpectomy  Ductal carcinoma in situ  Grade 2  Posterior margin positive for DCIS   Stage 0     2/16/2021 - 2/16/2021 Hormone Therapy    Consult with Dr. Orona  Hormonal therapy not recommended     3/4/2021 - 4/20/2021 Radiation    Course: C1    Plan ID Energy Fractions Dose per Fraction (cGy) Dose  Correction (cGy) Total Dose Delivered (cGy) Elapsed Days   BH L [id number] 6X 25 / 25 200 0 5,000 35   BH L Breast e 12E 8 / 8 200 0 1,600 11     [facility]           -Interval History: Patient is a 73 year old female presenting today for six-month follow-up for left breast cancer diagnosed in October 2020. She had a dx mammogram and ultrasound of the right breast on 10/25 which was BIRADS 2 category 3 density. Patient denies changes on her breast exam. She denies persistent headaches, bone pain, back pain, shortness of breath, or abdominal pain.      Review of Systems:  Review of Systems   Constitutional:  Negative for activity change, appetite change, fatigue and unexpected weight change.   Respiratory:  Negative for cough and shortness of breath.    Cardiovascular:  Negative for chest pain.   Gastrointestinal:  Negative for abdominal pain, diarrhea, nausea and vomiting.   Endocrine: Negative for heat intolerance.   Musculoskeletal:  Negative for arthralgias, back pain and myalgias.   Skin:  Negative for rash.   Neurological:  Negative for weakness and headaches.   Hematological:  Negative for adenopathy.       Patient Active Problem List   Diagnosis   • Basal cell carcinoma of skin   • Benign essential hypertension   • Mixed hyperlipidemia   • GERD without esophagitis   • Osteoarthritis of knee   • Osteopenia of multiple sites   • Overweight with body mass index (BMI) of 27 to 27.9 in adult   • IFG (impaired fasting glucose)   • History of ductal carcinoma in situ (DCIS) of breast   • Plantar fasciitis   • Greater trochanteric bursitis of left hip     Past Medical History:   Diagnosis Date   • Basal cell carcinoma 2015    Right wrist   • Basal cell carcinoma 09/2020    Right eye/cheek   • Breast cancer (HCC) 12/23/2020    left   • Cancer (HCC)     BCC   • Fibrocystic breast disease    • GERD (gastroesophageal reflux disease)    • History of radiation therapy 2021    Left breast WBRT   • Hypercholesterolemia    •  Hypertension      Past Surgical History:   Procedure Laterality Date   • BREAST BIOPSY Left 10/23/2020    stereo bx- dcis   • BREAST EXCISIONAL BIOPSY Right     neg- pt does not know when   • BREAST LUMPECTOMY Left 12/23/2020    Procedure: BREAST NEEDLE LOCALIZED LUMPECTOMY (NEEDLE LOC AT 0700);  Surgeon: Trey Bennett MD;  [facility];  Service: Surgical Oncology   • COLONOSCOPY     • MAMMO NEEDLE LOCALIZATION LEFT (ALL INC) Left 12/23/2020   • MAMMO STEREOTACTIC BREAST BIOPSY LEFT (ALL INC) Left 10/23/2020   • US GUIDED THYROID BIOPSY  4/14/2022     Family History   Problem Relation Age of Onset   • Hypertension Mother    • Thyroid disease Mother    • Diabetes Brother    • Kidney failure Brother    • No Known Problems Father    • No Known Problems Daughter    • No Known Problems Maternal Grandmother    • No Known Problems Maternal Grandfather    • No Known Problems Paternal Grandmother    • No Known Problems Paternal Grandfather    • No Known Problems Daughter    • No Known Problems Maternal Aunt    • No Known Problems Maternal Aunt    • No Known Problems Maternal Aunt    • No Known Problems Maternal Aunt    • No Known Problems Son    • No Known Problems Maternal Uncle    • No Known Problems Paternal Uncle    • No Known Problems Brother    • No Known Problems Maternal Uncle    • No Known Problems Paternal Uncle      Social History     Socioeconomic History   • Marital status: /Civil Union     Spouse name: Not on file   • Number of children: Not on file   • Years of education: Not on file   • Highest education level: Not on file   Occupational History   • Not on file   Tobacco Use   • Smoking status: Never   • Smokeless tobacco: Never   Vaping Use   • Vaping status: Never Used   Substance and Sexual Activity   • Alcohol use: No   • Drug use: No   • Sexual activity: Not Currently     Partners: Male     Birth control/protection: None   Other Topics Concern   • Not on file   Social History Narrative   •  Not on file     Social Determinants of Health     Financial Resource Strain: Low Risk  (9/22/2023)    Received from [facility]    Overall Financial Resource Strain (CARDIA)    • Difficulty of Paying Living Expenses: Not very hard   Food Insecurity: No Food Insecurity (9/22/2023)    Received from [facility]    Hunger Vital Sign    • Worried About Running Out of Food in the Last Year: Never true    • Ran Out of Food in the Last Year: Never true   Transportation Needs: No Transportation Needs (9/22/2023)    Received from [facility]    PRAPARE - Transportation    • Lack of Transportation (Medical): No    • Lack of Transportation (Non-Medical): No   Physical Activity: Not on file   Stress: Not on file   Social Connections: Not on file   Intimate Partner Violence: Patient Declined (9/22/2023)    Received from [facility]    Humiliation, Afraid, Rape, and Kick questionnaire    • Fear of Current or Ex-Partner: Patient declined    • Emotionally Abused: Patient declined    • Physically Abused: Patient declined    • Sexually Abused: Patient declined   Housing Stability: Low Risk  (9/22/2023)    Received from [facility]    Housing Stability Vital Sign    • Unable to Pay for Housing in the Last Year: No    • Number of Places Lived in the Last Year: 1    • Unstable Housing in the Last Year: No       Current Outpatient Medications:   •  Acetaminophen (TYLENOL 8 HOUR PO), Take by mouth as needed  , Disp: , Rfl:   •  amLODIPine (NORVASC) 2.5 mg tablet, Take 2.5 mg by mouth daily, Disp: , Rfl:   •  Biotin (SM Biotin) 5 MG TABS, Take 5,000 mg by mouth daily, Disp: , Rfl:   •  calcium citrate-vitamin D (CITRACAL+D) 315-200 MG-UNIT per tablet, Take 1 tablet by mouth daily, Disp: , Rfl:   •  carvedilol (COREG) 6.25 mg tablet, , Disp: , Rfl:   •  famotidine (PEPCID) 20 mg tablet, Take 1 tablet (20 mg total) by mouth 2 (two) times a day, Disp: 180  tablet, Rfl: 1  •  fluticasone (FLONASE) 50 mcg/act nasal spray, 1 spray into each nostril as needed , Disp: , Rfl:   •  hydrochlorothiazide (HYDRODIURIL) 12.5 mg tablet, Take 12.5 mg by mouth daily, Disp: , Rfl:   •  ibuprofen (MOTRIN) 200 mg tablet, Take by mouth every 6 (six) hours as needed for mild pain, Disp: , Rfl:   •  lisinopril (ZESTRIL) 40 mg tablet, Take 40 mg by mouth daily, Disp: , Rfl:   •  loratadine (CLARITIN) 10 mg tablet, Take 1 tablet by mouth daily as needed, Disp: , Rfl:   •  multivitamin-minerals (CENTRUM) tablet, Take 1 tablet by mouth daily, Disp: , Rfl:   •  pravastatin (PRAVACHOL) 40 mg tablet, , Disp: , Rfl:   •  amLODIPine (NORVASC) 5 mg tablet, Take 2.5 mg by mouth daily (Patient not taking: Reported on 2/19/2024), Disp: , Rfl:   •  carvedilol (COREG) 3.125 mg tablet, Take 3.125 mg by mouth in the morning and 3.125 mg in the evening. Take with meals. (Patient not taking: Reported on 1/11/2024), Disp: , Rfl:   •  pravastatin (PRAVACHOL) 10 mg tablet, Take 40 mg by mouth daily (Patient not taking: Reported on 8/15/2023), Disp: , Rfl:   No Known Allergies  Vitals:    02/19/24 1405   BP: 146/72   Pulse: 86   Resp: 16   Temp: 98 °F (36.7 °C)   SpO2: 97%       Physical Exam  Constitutional:       General: She is not in acute distress.     Appearance: Normal appearance.   Cardiovascular:      Rate and Rhythm: Normal rate and regular rhythm.      Pulses: Normal pulses.      Heart sounds: Normal heart sounds.   Pulmonary:      Effort: Pulmonary effort is normal.      Breath sounds: Normal breath sounds.   Chest:      Chest wall: No mass.   Breasts:     Right: No swelling, bleeding, inverted nipple, mass, nipple discharge, skin change or tenderness.      Left: No swelling, bleeding, inverted nipple, mass, nipple discharge, skin change or tenderness.   Abdominal:      General: Abdomen is flat.      Palpations: Abdomen is soft.   Lymphadenopathy:      Upper Body:      Right upper body: No  supraclavicular, axillary or pectoral adenopathy.      Left upper body: No supraclavicular, axillary or pectoral adenopathy.   Skin:     General: Skin is warm.   Neurological:      General: No focal deficit present.      Mental Status: She is alert and oriented to person, place, and time.   Psychiatric:         Mood and Affect: Mood normal.         Behavior: Behavior normal.           Results:    Imaging  No results found.    I reviewed the above imaging data.      Advance Care Planning/Advance Directives:  Discussed disease status, cancer treatment plans and/or cancer treatment goals with the patient.

## 2024-08-26 ENCOUNTER — OFFICE VISIT (OUTPATIENT)
Dept: SURGICAL ONCOLOGY | Facility: CLINIC | Age: 74
End: 2024-08-26
Payer: MEDICARE

## 2024-08-26 VITALS
HEIGHT: 62 IN | WEIGHT: 144 LBS | OXYGEN SATURATION: 99 % | HEART RATE: 74 BPM | DIASTOLIC BLOOD PRESSURE: 81 MMHG | SYSTOLIC BLOOD PRESSURE: 151 MMHG | BODY MASS INDEX: 26.5 KG/M2 | RESPIRATION RATE: 16 BRPM | TEMPERATURE: 97.4 F

## 2024-08-26 DIAGNOSIS — Z86.000 HISTORY OF DUCTAL CARCINOMA IN SITU (DCIS) OF BREAST: Primary | ICD-10-CM

## 2024-08-26 PROCEDURE — 99213 OFFICE O/P EST LOW 20 MIN: CPT

## 2024-08-26 NOTE — PROGRESS NOTES
Surgical Oncology Follow Up       [facility] SURGICAL ONCOLOGY [place]  [address]  [place] [address]    Cynthia Burt  1950  [id number]  [facility] SURGICAL ONCOLOGY [place]  [address] [facility] [address]    Chief Complaint   Patient presents with   • office visit       Assessment/Plan:  1. History of ductal carcinoma in situ (DCIS) of breast  - 6 mo f/u       Discussion/Summary: Patient is a 73 year old female presenting today for six-month follow-up for left breast cancer diagnosed in October 2020. Pathology revealed DCIS ER/MA positive. She underwent a left breast lumpectomy with Dr. Bennett and completed whole breast radiation therapy. Hormonal therapy was not recommended. She is on obs. She is scheduled for mammo in October. There were no concerns on her cbe. I will see the patient back in 6 months or sooner should the need arise. She was instructed to call with any questions or concerns prior to this time. All questions were answered today.        History of Present Illness:     Oncology History   Basal cell carcinoma of skin   10/3/2013 Initial Diagnosis    Basal cell carcinoma of skin     History of ductal carcinoma in situ (DCIS) of breast   10/23/2020 Biopsy    Left breast stereotactic biopsy  5 o'clock; middle portion  Ductal carcinoma in situ  Grade 2    MA 90    Malignancy appears unifocal. Calcifications cover 1 cm. US left axilla not performed. RIght breast clear.     12/23/2020 Surgery    Left breast needle localized lumpectomy  Ductal carcinoma in situ  Grade 2  Posterior margin positive for DCIS   Stage 0     2/16/2021 - 2/16/2021 Hormone Therapy    Consult with Dr. Orona  Hormonal therapy not recommended     3/4/2021 - 4/20/2021 Radiation    Course: C1    Plan ID Energy Fractions Dose per Fraction (cGy) Dose Correction (cGy) Total Dose Delivered (cGy) Elapsed Days   BH L [id number] 6X 25  / 25 200 0 5,000 35   BH L Breast e 12E 8 / 8 200 0 1,600 11    Dr Wall           -Interval History: Patient is a 73 year old female presenting today for six-month follow-up for left breast cancer diagnosed in October 2020. She is on obs. She is scheduled for mammo in October. Patient denies changes on her breast exam. She denies persistent headaches, bone pain, back pain, shortness of breath, or abdominal pain.      Review of Systems:  Review of Systems   Constitutional:  Negative for activity change, appetite change, fatigue and unexpected weight change.   Respiratory:  Negative for cough and shortness of breath.    Cardiovascular:  Negative for chest pain.   Gastrointestinal:  Negative for abdominal pain, diarrhea, nausea and vomiting.   Endocrine: Negative for heat intolerance.   Musculoskeletal:  Negative for arthralgias, back pain and myalgias.   Skin:  Negative for rash.   Neurological:  Negative for weakness and headaches.   Hematological:  Negative for adenopathy.       Patient Active Problem List   Diagnosis   • Basal cell carcinoma of skin   • Benign essential hypertension   • Mixed hyperlipidemia   • GERD without esophagitis   • Osteoarthritis of knee   • Osteopenia of multiple sites   • Overweight with body mass index (BMI) of 27 to 27.9 in adult   • IFG (impaired fasting glucose)   • History of ductal carcinoma in situ (DCIS) of breast   • Plantar fasciitis   • Greater trochanteric bursitis of left hip     Past Medical History:   Diagnosis Date   • Basal cell carcinoma 2015    Right wrist   • Basal cell carcinoma 09/2020    Right eye/cheek   • Breast cancer (HCC) 12/23/2020    left   • Cancer (HCC)     BCC   • Fibrocystic breast disease    • GERD (gastroesophageal reflux disease)    • History of radiation therapy 2021    Left breast WBRT   • Hypercholesterolemia    • Hypertension      Past Surgical History:   Procedure Laterality Date   • BREAST BIOPSY Left 10/23/2020    stereo bx- dcis   • BREAST  EXCISIONAL BIOPSY Right     neg- pt does not know when   • BREAST LUMPECTOMY Left 12/23/2020    Procedure: BREAST NEEDLE LOCALIZED LUMPECTOMY (NEEDLE LOC AT 0700);  Surgeon: Trey Bennett MD;  Location: [facility];  Service: Surgical Oncology   • COLONOSCOPY     • MAMMO NEEDLE LOCALIZATION LEFT (ALL INC) Left 12/23/2020   • MAMMO STEREOTACTIC BREAST BIOPSY LEFT (ALL INC) Left 10/23/2020   • US GUIDED THYROID BIOPSY  4/14/2022     Family History   Problem Relation Age of Onset   • Hypertension Mother    • Thyroid disease Mother    • Diabetes Brother    • Kidney failure Brother    • No Known Problems Father    • No Known Problems Daughter    • No Known Problems Maternal Grandmother    • No Known Problems Maternal Grandfather    • No Known Problems Paternal Grandmother    • No Known Problems Paternal Grandfather    • No Known Problems Daughter    • No Known Problems Maternal Aunt    • No Known Problems Maternal Aunt    • No Known Problems Maternal Aunt    • No Known Problems Maternal Aunt    • No Known Problems Son    • No Known Problems Maternal Uncle    • No Known Problems Paternal Uncle    • No Known Problems Brother    • No Known Problems Maternal Uncle    • No Known Problems Paternal Uncle      Social History     Socioeconomic History   • Marital status: /Civil Union     Spouse name: Not on file   • Number of children: Not on file   • Years of education: Not on file   • Highest education level: Not on file   Occupational History   • Not on file   Tobacco Use   • Smoking status: Never   • Smokeless tobacco: Never   Vaping Use   • Vaping status: Never Used   Substance and Sexual Activity   • Alcohol use: No   • Drug use: No   • Sexual activity: Not Currently     Partners: Male     Birth control/protection: None   Other Topics Concern   • Not on file   Social History Narrative   • Not on file     Social Determinants of Health     Financial Resource Strain: Low Risk  (9/22/2023)    Received from [facility]  [facility], [facility]    Overall Financial Resource Strain (CARDIA)    • Difficulty of Paying Living Expenses: Not very hard   Food Insecurity: No Food Insecurity (9/22/2023)    Received from [facility], [facility]    Hunger Vital Sign    • Worried About Running Out of Food in the Last Year: Never true    • Ran Out of Food in the Last Year: Never true   Transportation Needs: No Transportation Needs (9/22/2023)    Received from [facility], [facility]    PRAPARE - Transportation    • Lack of Transportation (Medical): No    • Lack of Transportation (Non-Medical): No   Physical Activity: Not on file   Stress: Not on file   Social Connections: Not on file   Intimate Partner Violence: Patient Declined (9/22/2023)    Received from [facility], [facility]    Humiliation, Afraid, Rape, and Kick questionnaire    • Fear of Current or Ex-Partner: Patient declined    • Emotionally Abused: Patient declined    • Physically Abused: Patient declined    • Sexually Abused: Patient declined   Housing Stability: Low Risk  (9/22/2023)    Received from [facility], [facility]    Housing Stability Vital Sign    • Unable to Pay for Housing in the Last Year: No    • Number of Places Lived in the Last Year: 1    • Unstable Housing in the Last Year: No       Current Outpatient Medications:   •  Acetaminophen (TYLENOL 8 HOUR PO), Take by mouth as needed  , Disp: , Rfl:   •  amLODIPine (NORVASC) 2.5 mg tablet, Take 2.5 mg by mouth daily, Disp: , Rfl:   •  Biotin (SM Biotin) 5 MG TABS, Take 5,000 mg by mouth daily, Disp: , Rfl:   •  calcium citrate-vitamin D (CITRACAL+D) 315-200 MG-UNIT per tablet, Take 1 tablet by mouth daily, Disp: , Rfl:   •  carvedilol (COREG) 6.25 mg tablet, , Disp: , Rfl:   •  famotidine (PEPCID) 20 mg tablet, Take 1 tablet (20 mg total) by mouth 2 (two) times a  day, Disp: 180 tablet, Rfl: 1  •  fluticasone (FLONASE) 50 mcg/act nasal spray, 1 spray into each nostril as needed , Disp: , Rfl:   •  hydrochlorothiazide (HYDRODIURIL) 12.5 mg tablet, Take 12.5 mg by mouth daily, Disp: , Rfl:   •  ibuprofen (MOTRIN) 200 mg tablet, Take by mouth every 6 (six) hours as needed for mild pain, Disp: , Rfl:   •  lisinopril (ZESTRIL) 40 mg tablet, Take 40 mg by mouth daily, Disp: , Rfl:   •  loratadine (CLARITIN) 10 mg tablet, Take 1 tablet by mouth daily as needed, Disp: , Rfl:   •  multivitamin-minerals (CENTRUM) tablet, Take 1 tablet by mouth daily, Disp: , Rfl:   •  pravastatin (PRAVACHOL) 40 mg tablet, , Disp: , Rfl:   •  amLODIPine (NORVASC) 5 mg tablet, Take 2.5 mg by mouth daily (Patient not taking: Reported on 2/19/2024), Disp: , Rfl:   •  carvedilol (COREG) 3.125 mg tablet, Take 3.125 mg by mouth in the morning and 3.125 mg in the evening. Take with meals. (Patient not taking: Reported on 1/11/2024), Disp: , Rfl:   •  pravastatin (PRAVACHOL) 10 mg tablet, Take 40 mg by mouth daily (Patient not taking: Reported on 8/15/2023), Disp: , Rfl:   No Known Allergies  Vitals:    08/26/24 1224   BP: 151/81   Pulse: 74   Resp: 16   Temp: (!) 97.4 °F (36.3 °C)   SpO2: 99%       Physical Exam  Constitutional:       General: She is not in acute distress.     Appearance: Normal appearance.   Cardiovascular:      Rate and Rhythm: Normal rate and regular rhythm.      Pulses: Normal pulses.      Heart sounds: Normal heart sounds.   Pulmonary:      Effort: Pulmonary effort is normal.      Breath sounds: Normal breath sounds.   Chest:      Chest wall: No mass.   Breasts:     Right: No swelling, bleeding, inverted nipple, mass, nipple discharge, skin change or tenderness.      Left: No swelling, bleeding, inverted nipple, mass, nipple discharge, skin change or tenderness.       Abdominal:      General: Abdomen is flat.      Palpations: Abdomen is soft.   Lymphadenopathy:      Upper Body:      Right  upper body: No supraclavicular, axillary or pectoral adenopathy.      Left upper body: No supraclavicular, axillary or pectoral adenopathy.   Skin:     General: Skin is warm.   Neurological:      General: No focal deficit present.      Mental Status: She is alert and oriented to person, place, and time.   Psychiatric:         Mood and Affect: Mood normal.         Behavior: Behavior normal.           Results:    Imaging  XR hips bilateral 5+ w pelvis if performed    Result Date: 8/19/2024  Narrative: STUDY: Single view pelvis. Two views right hip. Two views left hip. INDICATION: Bilateral hip pain. COMPARISON: None ACCESSION NUMBER(S): [id number] ORDERING CLINICIAN: CEFERINO ESPANA FINDINGS: No acute fracture or malalignment. Bilateral hip joint spaces are well preserved. Soft tissues are within normal limits.    Impression: IMPRESSION: 1.  Unremarkable pelvis and bilateral hip radiographs. Workstation:[id number]      I reviewed the above imaging data.      Advance Care Planning/Advance Directives:  Discussed disease status, cancer treatment plans and/or cancer treatment goals with the patient.

## 2024-12-10 ENCOUNTER — HOSPITAL ENCOUNTER (OUTPATIENT)
Dept: RADIOLOGY | Facility: HOSPITAL | Age: 74
Discharge: HOME | End: 2024-12-10
Payer: MEDICARE

## 2024-12-10 VITALS — BODY MASS INDEX: 26.13 KG/M2 | WEIGHT: 142 LBS | HEIGHT: 62 IN

## 2024-12-10 DIAGNOSIS — Z12.31 ENCOUNTER FOR SCREENING MAMMOGRAM FOR MALIGNANT NEOPLASM OF BREAST: ICD-10-CM

## 2024-12-10 PROCEDURE — 77063 BREAST TOMOSYNTHESIS BI: CPT

## 2024-12-10 PROCEDURE — 77067 SCR MAMMO BI INCL CAD: CPT

## 2025-01-14 ENCOUNTER — APPOINTMENT (OUTPATIENT)
Dept: RADIATION ONCOLOGY | Facility: HOSPITAL | Age: 75
End: 2025-01-14
Payer: MEDICARE

## 2025-01-17 ENCOUNTER — OFFICE VISIT (OUTPATIENT)
Dept: RADIATION ONCOLOGY | Facility: HOSPITAL | Age: 75
End: 2025-01-17
Attending: INTERNAL MEDICINE
Payer: MEDICARE

## 2025-01-17 VITALS
BODY MASS INDEX: 26.13 KG/M2 | HEART RATE: 83 BPM | HEIGHT: 62 IN | OXYGEN SATURATION: 97 % | DIASTOLIC BLOOD PRESSURE: 84 MMHG | TEMPERATURE: 97.6 F | WEIGHT: 142 LBS | RESPIRATION RATE: 18 BRPM | SYSTOLIC BLOOD PRESSURE: 136 MMHG

## 2025-01-17 DIAGNOSIS — Z86.000 ENCOUNTER FOR FOLLOW-UP SURVEILLANCE OF DUCTAL CARCINOMA IN SITU OF BREAST: ICD-10-CM

## 2025-01-17 DIAGNOSIS — Z08 ENCOUNTER FOR FOLLOW-UP SURVEILLANCE OF DUCTAL CARCINOMA IN SITU OF BREAST: ICD-10-CM

## 2025-01-17 DIAGNOSIS — Z86.000 HISTORY OF DUCTAL CARCINOMA IN SITU (DCIS) OF BREAST: Primary | ICD-10-CM

## 2025-01-17 PROBLEM — Z85.3 ENCOUNTER FOR FOLLOW-UP SURVEILLANCE OF BREAST CANCER: Status: ACTIVE | Noted: 2025-01-17

## 2025-01-17 PROBLEM — Z85.3 ENCOUNTER FOR FOLLOW-UP SURVEILLANCE OF BREAST CANCER: Status: RESOLVED | Noted: 2025-01-17 | Resolved: 2025-01-17

## 2025-01-17 PROCEDURE — 99213 OFFICE O/P EST LOW 20 MIN: CPT | Performed by: INTERNAL MEDICINE

## 2025-01-17 PROCEDURE — 99211 OFF/OP EST MAY X REQ PHY/QHP: CPT | Performed by: INTERNAL MEDICINE

## 2025-01-17 NOTE — PROGRESS NOTES
Follow-up Visit   Name: Cynthia Burt      : 1950      MRN: [id number]  Encounter Provider: Josie Ellsworth MD  Encounter Date: 2025   Encounter department: [facility] RADIATION ONCOLOGY  :  Assessment & Plan  History of ductal carcinoma in situ (DCIS) of breast  Cynthia Burt is a 74 y.o. female prior patient of Dr. Wall, transferred due to change in provider clinic location, who was treated adjuvantly for DCIS completing on 2021.     She returns for routine follow-up now nearly 4 years after completion of treatment.  Her latest mammogram shows no evidence of disease and her breast exam is benign as well.  She has no late toxicity related to treatment.  Continue surveillance  Continue surgical oncology follow-up  RTC in 1 year or as needed, she prefers to return in 1 year.           History of Present Illness   Chief Complaint   Patient presents with   • Follow-up     Radiation oncology    Pertinent Medical History   Cynthia Burt 1950 is a 74 y.o. 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 She status post breast conservation surgery. She has a positive posterior margin at the chest wall. She completed a course of adjuvant radiation therapy to the left breast on 21. Dr. Orona did not recommend adjuvant hormonal therapy. The pt was last seen in radiation on 24. She returns today for follow up.     24 - Surg OncVelia  No concerns on breast exam  Follow up in 6 months     24 - Surg OncVelia  No concerns on breast exam  Follow up in 6 months        12/10/24 - Mammo screening bilateral w 3d and cad  FINDINGS:   Bilateral  There are no suspicious masses, grouped microcalcifications or areas of unexplained architectural distortion. The skin and nipple areolar complex are unremarkable.  Stable postsurgical changes in the left breast.  IMPRESSION:  No mammographic evidence of malignancy.  ASSESSMENT/BI-RADS  "CATEGORY:  Left: 2 - Benign  Right: 2 - Benign  Overall: 2 - Benign        Upcomin25 - Surg OncVelia    Feels well overall. No complaints related to the breast   Oncology History   Cancer Staging   No matching staging information was found for the patient.  Oncology History   Basal cell carcinoma of skin   10/3/2013 Initial Diagnosis    Basal cell carcinoma of skin     History of ductal carcinoma in situ (DCIS) of breast   10/23/2020 Biopsy    Left breast stereotactic biopsy  5 o'clock; middle portion  Ductal carcinoma in situ  Grade 2    AZ 90    Malignancy appears unifocal. Calcifications cover 1 cm. US left axilla not performed. RIght breast clear.     2020 Surgery    Left breast needle localized lumpectomy  Ductal carcinoma in situ  Grade 2  Posterior margin positive for DCIS   Stage 0     2021 - 2021 Hormone Therapy    Consult with Dr. Orona  Hormonal therapy not recommended     3/4/2021 - 2021 Radiation    Course: C1    Plan ID Energy Fractions Dose per Fraction (cGy) Dose Correction (cGy) Total Dose Delivered (cGy) Elapsed Days   BH L [id number] 6X 25 / 25 200 0 5,000 35   BH L Breast e 12E  /  200 0 1,600 11    Dr Wall         Review of Systems Refer to nursing note.          Objective   /84 (BP Location: Right arm, Patient Position: Sitting, Cuff Size: Standard)   Pulse 83   Temp 97.6 °F (36.4 °C) (Temporal)   Resp 18   Ht 5' 2\" (1.575 m)   Wt 64.4 kg (142 lb)   SpO2 97%   BMI 25.97 kg/m²     Pain Screening:  Pain Score: 0-No pain  ECOG    Physical Exam   General Appearance:  Alert, cooperative, no distress, appears stated age  Breast Exam:  Left  Breast: No residual dyspigmentation in the irradiated field. No fibrosis, no edema. No palpable abnormalities in the breast, axilla, or supraclavicular region.   Right Breast: Benign exam. No palpable abnormalities in the breast, axilla, or supraclavicular region  Exam chaperoned by female nursing " "staff.  Lungs: Respirations unlabored, no cyanosis, able to speak in complete sentences without dyspnea.  Extremities: No cyanosis or edema  Skin: No generalized rash or dermatitis  Neurologic: ANOx3, speech and cognition intact.    Administrative Statements   I have spent a total time of 20 minutes in caring for this patient on the day of the visit/encounter including Instructions for management, Counseling / Coordination of care, Documenting in the medical record, Reviewing / ordering tests, medicine, procedures  , and Obtaining or reviewing history  .   Portions of the record may have been created with voice recognition software.  Occasional wrong word or \"sound a like\" substitutions may have occurred due to the inherent limitations of voice recognition software.  Read the chart carefully and recognize, using context, where substitutions have occurred.  "

## 2025-01-17 NOTE — ASSESSMENT & PLAN NOTE
Cynthia Burt is a 74 y.o. female prior patient of Dr. Wall, transferred due to change in provider clinic location, who was treated adjuvantly for DCIS completing on 4/20/2021.     She returns for routine follow-up now nearly 4 years after completion of treatment.  Her latest mammogram shows no evidence of disease and her breast exam is benign as well.  She has no late toxicity related to treatment.  Continue surveillance  Continue surgical oncology follow-up  RTC in 1 year or as needed, she prefers to return in 1 year.

## 2025-01-17 NOTE — PROGRESS NOTES
Cynthia Burt 1950 is a 74 y.o. 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 She status post breast conservation surgery. She has a positive posterior margin at the chest wall. She completed a course of adjuvant radiation therapy to the left breast on 21. Dr. Orona did not recommend adjuvant hormonal therapy. The pt was last seen in radiation on 24. She returns today for follow up.    24 - Surg OncVelia  No concerns on breast exam  Follow up in 6 months    24 - Surg OncVelia  No concerns on breast exam  Follow up in 6 months      12/10/24 - Mammo screening bilateral w 3d and cad  FINDINGS:   Bilateral  There are no suspicious masses, grouped microcalcifications or areas of unexplained architectural distortion. The skin and nipple areolar complex are unremarkable.  Stable postsurgical changes in the left breast.  IMPRESSION:  No mammographic evidence of malignancy.  ASSESSMENT/BI-RADS CATEGORY:  Left: 2 - Benign  Right: 2 - Benign  Overall: 2 - Benign       Upcomin25 - Surg Velia Abernathy    Follow up visit     Oncology History   Basal cell carcinoma of skin   10/3/2013 Initial Diagnosis    Basal cell carcinoma of skin     History of ductal carcinoma in situ (DCIS) of breast   10/23/2020 Biopsy    Left breast stereotactic biopsy  5 o'clock; middle portion  Ductal carcinoma in situ  Grade 2    MO 90    Malignancy appears unifocal. Calcifications cover 1 cm. US left axilla not performed. RIght breast clear.     2020 Surgery    Left breast needle localized lumpectomy  Ductal carcinoma in situ  Grade 2  Posterior margin positive for DCIS   Stage 0     2021 - 2021 Hormone Therapy    Consult with Dr. Orona  Hormonal therapy not recommended     3/4/2021 - 2021 Radiation    Course: C1    Plan ID Energy Fractions Dose per Fraction (cGy) Dose Correction (cGy) Total Dose Delivered (cGy) Elapsed Days   BH L BreLuis [id number]  6X 25 / 25 200 0 5,000 35   BH L Breast e 12E 8 / 8 200 0 1,600 11    Dr Wall          Review of Systems:  Review of Systems   Constitutional: Negative.    HENT: Negative.     Eyes: Negative.         Wears glasses    Respiratory: Negative.     Cardiovascular: Negative.    Gastrointestinal: Negative.    Endocrine: Negative.    Genitourinary: Negative.    Musculoskeletal:  Positive for arthralgias.   Skin: Negative.    Allergic/Immunologic: Negative.    Neurological:  Positive for headaches (occasional - if having sinus issues).   Hematological: Negative.    Psychiatric/Behavioral: Negative.         Health Maintenance   Topic Date Due    Fall Risk  10/18/2022    Urinary Incontinence Screening  10/18/2022    Medicare Annual Wellness Visit (AWV)  10/18/2022    BMI: Followup Plan  10/18/2022    RSV Vaccine for Pregnant Patients and Patients Age 60+ Years (1 - 1-dose 75+ series) Never done    Colorectal Cancer Screening  09/27/2024    Depression Screening  01/11/2025    BMI: Adult  12/10/2025    Breast Cancer Screening: Mammogram  12/10/2025    Hepatitis C Screening  Completed    Osteoporosis Screening  Completed    Zoster Vaccine  Completed    Pneumococcal Vaccine: 65+ Years  Completed    Influenza Vaccine  Completed    COVID-19 Vaccine  Completed    Meningococcal B Vaccine  Aged Out    RSV Vaccine age 0-20 Months  Aged Out    HIB Vaccine  Aged Out    IPV Vaccine  Aged Out    Hepatitis A Vaccine  Aged Out    Meningococcal ACWY Vaccine  Aged Out    HPV Vaccine  Aged Out     Patient Active Problem List   Diagnosis    Basal cell carcinoma of skin    Benign essential hypertension    Mixed hyperlipidemia    GERD without esophagitis    Osteoarthritis of knee    Osteopenia of multiple sites    Overweight with body mass index (BMI) of 27 to 27.9 in adult    IFG (impaired fasting glucose)    History of ductal carcinoma in situ (DCIS) of breast    Plantar fasciitis    Greater trochanteric bursitis of left hip     Past Medical  History:   Diagnosis Date    Basal cell carcinoma 2015    Right wrist    Basal cell carcinoma 09/2020    Right eye/cheek    Breast cancer (HCC) 12/23/2020    left    Cancer (HCC)     BCC    Fibrocystic breast disease     GERD (gastroesophageal reflux disease)     History of radiation therapy 2021    Left breast WBRT    Hypercholesterolemia     Hypertension      Past Surgical History:   Procedure Laterality Date    BREAST BIOPSY Left 10/23/2020    stereo bx- dcis    BREAST EXCISIONAL BIOPSY Right     neg- pt does not know when    BREAST LUMPECTOMY Left 12/23/2020    Procedure: BREAST NEEDLE LOCALIZED LUMPECTOMY (NEEDLE LOC AT 0700);  Surgeon: Trey Bennett MD;  Location: [facility];  Service: Surgical Oncology    COLONOSCOPY      MAMMO NEEDLE LOCALIZATION LEFT (ALL INC) Left 12/23/2020    MAMMO STEREOTACTIC BREAST BIOPSY LEFT (ALL INC) Left 10/23/2020    US GUIDED THYROID BIOPSY  4/14/2022     Family History   Problem Relation Age of Onset    Hypertension Mother     Thyroid disease Mother     Diabetes Brother     Kidney failure Brother     No Known Problems Father     No Known Problems Daughter     No Known Problems Maternal Grandmother     No Known Problems Maternal Grandfather     No Known Problems Paternal Grandmother     No Known Problems Paternal Grandfather     No Known Problems Daughter     No Known Problems Maternal Aunt     No Known Problems Maternal Aunt     No Known Problems Maternal Aunt     No Known Problems Maternal Aunt     No Known Problems Son     No Known Problems Maternal Uncle     No Known Problems Paternal Uncle     No Known Problems Brother     No Known Problems Maternal Uncle     No Known Problems Paternal Uncle      Social History     Socioeconomic History    Marital status: /Civil Union     Spouse name: Not on file    Number of children: Not on file    Years of education: Not on file    Highest education level: Not on file   Occupational History    Not on file   Tobacco Use    Smoking  status: Never    Smokeless tobacco: Never   Vaping Use    Vaping status: Never Used   Substance and Sexual Activity    Alcohol use: No    Drug use: No    Sexual activity: Not Currently     Partners: Male     Birth control/protection: None   Other Topics Concern    Not on file   Social History Narrative    Not on file     Social Drivers of Health     Financial Resource Strain: Low Risk  (9/22/2023)    Received from [facility], [facility]    Overall Financial Resource Strain (CARDIA)     Difficulty of Paying Living Expenses: Not very hard   Food Insecurity: No Food Insecurity (9/22/2023)    Received from [facility]    Hunger Vital Sign     Worried About Running Out of Food in the Last Year: Never true     Ran Out of Food in the Last Year: Never true   Transportation Needs: No Transportation Needs (9/22/2023)    Received from [facility]    PRAPARE - Transportation     Lack of Transportation (Medical): No     Lack of Transportation (Non-Medical): No   Physical Activity: Not on file   Stress: Not on file   Social Connections: Not on file   Intimate Partner Violence: Patient Declined (9/22/2023)    Received from [facility], [facility]    Humiliation, Afraid, Rape, and Kick questionnaire     Fear of Current or Ex-Partner: Patient declined     Emotionally Abused: Patient declined     Physically Abused: Patient declined     Sexually Abused: Patient declined   Housing Stability: Low Risk  (9/22/2023)    Received from [facility], [facility]    Housing Stability Vital Sign     Unable to Pay for Housing in the Last Year: No     Number of Places Lived in the Last Year: 1     Unstable Housing in the Last Year: No       Current Outpatient Medications:     Acetaminophen (TYLENOL 8 HOUR PO), Take by mouth as needed  , Disp: , Rfl:      amLODIPine (NORVASC) 2.5 mg tablet, Take 2.5 mg by mouth daily, Disp: , Rfl:     Biotin (SM Biotin) 5 MG TABS, Take 5,000 mg by mouth daily, Disp: , Rfl:     calcium citrate-vitamin D (CITRACAL+D) 315-200 MG-UNIT per tablet, Take 1 tablet by mouth daily, Disp: , Rfl:     carvedilol (COREG) 3.125 mg tablet, Take 3.125 mg by mouth in the morning and 3.125 mg in the evening. Take with meals. (Patient not taking: Reported on 1/11/2024), Disp: , Rfl:     carvedilol (COREG) 6.25 mg tablet, , Disp: , Rfl:     famotidine (PEPCID) 20 mg tablet, Take 1 tablet (20 mg total) by mouth 2 (two) times a day, Disp: 180 tablet, Rfl: 1    fluticasone (FLONASE) 50 mcg/act nasal spray, 1 spray into each nostril as needed , Disp: , Rfl:     hydrochlorothiazide (HYDRODIURIL) 12.5 mg tablet, Take 12.5 mg by mouth daily, Disp: , Rfl:     ibuprofen (MOTRIN) 200 mg tablet, Take by mouth every 6 (six) hours as needed for mild pain, Disp: , Rfl:     lisinopril (ZESTRIL) 40 mg tablet, Take 40 mg by mouth daily, Disp: , Rfl:     loratadine (CLARITIN) 10 mg tablet, Take 1 tablet by mouth daily as needed, Disp: , Rfl:     multivitamin-minerals (CENTRUM) tablet, Take 1 tablet by mouth daily, Disp: , Rfl:     pravastatin (PRAVACHOL) 10 mg tablet, Take 40 mg by mouth daily (Patient not taking: Reported on 8/15/2023), Disp: , Rfl:     pravastatin (PRAVACHOL) 40 mg tablet, , Disp: , Rfl:   No Known Allergies  There were no vitals filed for this visit.

## 2025-03-17 ENCOUNTER — OFFICE VISIT (OUTPATIENT)
Dept: SURGICAL ONCOLOGY | Facility: CLINIC | Age: 75
End: 2025-03-17
Payer: MEDICARE

## 2025-03-17 VITALS
DIASTOLIC BLOOD PRESSURE: 80 MMHG | OXYGEN SATURATION: 97 % | SYSTOLIC BLOOD PRESSURE: 140 MMHG | BODY MASS INDEX: 27.97 KG/M2 | HEART RATE: 88 BPM | RESPIRATION RATE: 16 BRPM | WEIGHT: 152 LBS | HEIGHT: 62 IN | TEMPERATURE: 97.9 F

## 2025-03-17 DIAGNOSIS — Z12.31 VISIT FOR SCREENING MAMMOGRAM: ICD-10-CM

## 2025-03-17 DIAGNOSIS — Z86.000 HISTORY OF DUCTAL CARCINOMA IN SITU (DCIS) OF BREAST: ICD-10-CM

## 2025-03-17 DIAGNOSIS — Z08 ENCOUNTER FOR FOLLOW-UP SURVEILLANCE OF DUCTAL CARCINOMA IN SITU OF BREAST: Primary | ICD-10-CM

## 2025-03-17 DIAGNOSIS — Z86.000 ENCOUNTER FOR FOLLOW-UP SURVEILLANCE OF DUCTAL CARCINOMA IN SITU OF BREAST: Primary | ICD-10-CM

## 2025-03-17 PROCEDURE — 99213 OFFICE O/P EST LOW 20 MIN: CPT

## 2025-03-17 RX ORDER — PANTOPRAZOLE SODIUM 20 MG/1
20 TABLET, DELAYED RELEASE ORAL DAILY
COMMUNITY

## 2025-03-17 NOTE — PROGRESS NOTES
Name: Cynthia Burt      : 1950      MRN: [id number]  Encounter Provider: ROCK Connor  Encounter Date: 3/17/2025   Encounter department: [facility]  :  Assessment & Plan  Encounter for follow-up surveillance of ductal carcinoma in situ of breast  - 1 year f/u  History of ductal carcinoma in situ (DCIS) of breast  Patient is a 74 year old female presenting today for a follow-up for left breast cancer diagnosed in 2020. Pathology revealed DCIS ER/NY positive. She underwent a left breast lumpectomy with Dr. Bennett and completed whole breast radiation therapy. Hormonal therapy was not recommended. She is on obs. She had a b/l screening mammo on 12/10/24 which was BIRADS 2 category 2 density. There were no concerns on her cbe. Patient denies changes on her breast exam. She denies persistent headaches, bone pain, back pain, shortness of breath, or abdominal pain.  I will see the patient back in 1 year or sooner should the need arise. She was instructed to call with any questions or concerns prior to this time. All questions were answered today.     Visit for screening mammogram  Orders:  •  Mammo screening bilateral w 3d and cad; Future      Oncology History   Cancer Staging   No matching staging information was found for the patient.  Oncology History   Basal cell carcinoma of skin   10/3/2013 Initial Diagnosis    Basal cell carcinoma of skin     History of ductal carcinoma in situ (DCIS) of breast   10/23/2020 Biopsy    Left breast stereotactic biopsy  5 o'clock; middle portion  Ductal carcinoma in situ  Grade 2    [place] 90    Malignancy appears unifocal. Calcifications cover 1 cm. US left axilla not performed. RIght breast clear.     2020 Surgery    Left breast needle localized lumpectomy  Ductal carcinoma in situ  Grade 2  Posterior margin positive for DCIS   Stage 0     2021 - 2021 Hormone Therapy    Consult with   "Yeyo  Hormonal therapy not recommended     3/4/2021 - 4/20/2021 Radiation    Course: C1    Plan ID Energy Fractions Dose per Fraction (cGy) Dose Correction (cGy) Total Dose Delivered (cGy) Elapsed Days   BH L [id number] 6X 25 / 25 200 0 5,000 35   BH L Breast e 12E 8 / 8 200 0 1,600 11    Dr Wall         Review of Systems   Constitutional:  Negative for activity change, appetite change, fatigue and unexpected weight change.   Respiratory:  Negative for cough and shortness of breath.    Cardiovascular:  Negative for chest pain.   Gastrointestinal:  Negative for abdominal pain, diarrhea, nausea and vomiting.   Endocrine: Negative for heat intolerance.   Musculoskeletal:  Negative for arthralgias, back pain and myalgias.   Skin:  Negative for rash.   Neurological:  Negative for weakness and headaches.   Hematological:  Negative for adenopathy.    A complete review of systems is negative other than that noted above in the HPI.           Objective   /80 (BP Location: Left arm, Patient Position: Sitting, Cuff Size: Standard)   Pulse 88   Temp 97.9 °F (36.6 °C) (Temporal)   Resp 16   Ht 5' 2\" (1.575 m)   Wt 68.9 kg (152 lb)   SpO2 97%   BMI 27.80 kg/m²     Pain Screening:  Pain Score: 0-No pain  ECOG    Physical Exam  Constitutional:       General: She is not in acute distress.     Appearance: Normal appearance.   Cardiovascular:      Rate and Rhythm: Normal rate and regular rhythm.      Pulses: Normal pulses.      Heart sounds: Normal heart sounds.   Pulmonary:      Effort: Pulmonary effort is normal.      Breath sounds: Normal breath sounds.   Chest:      Chest wall: No mass.   Breasts:     Right: No swelling, bleeding, inverted nipple, mass, nipple discharge, skin change or tenderness.      Left: No swelling, bleeding, inverted nipple, mass, nipple discharge, skin change or tenderness.       Abdominal:      General: Abdomen is flat.      Palpations: Abdomen is soft.   Lymphadenopathy:      Upper Body:      " Right upper body: No supraclavicular, axillary or pectoral adenopathy.      Left upper body: No supraclavicular, axillary or pectoral adenopathy.   Skin:     General: Skin is warm.   Neurological:      General: No focal deficit present.      Mental Status: She is alert and oriented to person, place, and time.   Psychiatric:         Mood and Affect: Mood normal.         Behavior: Behavior normal.          No visits with results within 1 Month(s) from this visit.   Latest known visit with results is:   Office Visit on 10/12/2023   Component Date Value Ref Range Status   • SARS-CoV-2 10/12/2023 Negative  Negative Final   • INFLUENZA A PCR 10/12/2023 Negative  Negative Final   • INFLUENZA B PCR 10/12/2023 Negative  Negative Final

## 2025-03-17 NOTE — ASSESSMENT & PLAN NOTE
Patient is a 74 year old female presenting today for a follow-up for left breast cancer diagnosed in October 2020. Pathology revealed DCIS ER/IA positive. She underwent a left breast lumpectomy with Dr. Bennett and completed whole breast radiation therapy. Hormonal therapy was not recommended. She is on obs. She had a b/l screening mammo on 12/10/24 which was BIRADS 2 category 2 density. There were no concerns on her cbe. Patient denies changes on her breast exam. She denies persistent headaches, bone pain, back pain, shortness of breath, or abdominal pain.  I will see the patient back in 1 year or sooner should the need arise. She was instructed to call with any questions or concerns prior to this time. All questions were answered today.

## 2025-06-24 NOTE — ASSESSMENT & PLAN NOTE
2013 DEXA: T-scores of -2 0, -1 7 and -1 1 in the lumbar spine, total left hip and left femoral neck  Cont Calcium and Vit D  Recheck DEXA
BP Readings from Last 3 Encounters:   02/13/19 148/82   02/19/18 124/72   12/22/17 150/78     Results from last 6 Months   Lab Units 02/05/19  0939   POTASSIUM mmol/L 4 7   CHLORIDE mmol/L 101   CO2 mmol/L 30   BUN mg/dL 13   SL AMB CALCIUM mg/dL 9 8     Borderline controlled on current regimen:  -Lisinopril/HCTZ 20/12 5mg  -Metoprolol succinate 50mg daily
Has been on chronic PPI (Omeprazole 20mg) every day for years, never had an EGD, gets rebound symptoms if she miss a dose  Titration instructions provided to patient over the next 2 months
Lab Results   Component Value Date    [id number] 1 21 05/24/2017     Inaccurate diagnosis, resolved
Last Lipid Panel:  Lab Results   Component Value Date    CHOLESTEROL 204 (H) 02/05/2019    HDL 38 (L) 02/05/2019    LDLC 136 (H) 02/05/2019    TRIG 167 (H) 02/05/2019    [place] 157 05/24/2017     The 10-year ASCVD risk score (Frannie Mcmillan et al , 2013) is: 20%    Values used to calculate the score:      Age: 77 years      Sex: Female      Is Non- : No      Diabetic: No      Tobacco smoker: No      Systolic Blood Pressure: 085 mmHg      Is BP treated: Yes      HDL Cholesterol: 38 mg/dL      Total Cholesterol: 204 mg/dL    Reviewed healthy, low cholesterol diet, given handout  Currently uncontrolled on Pravastatin 20mg 2x/week  Had bad myalgias with Atorvastatin  Change to Rosuvastatin 20mg daily  Continue daily ASA 81mg
24-Jun-2025 11:13

## (undated) DEVICE — NEEDLE 25G X 1 1/2

## (undated) DEVICE — VIAL DECANTER

## (undated) DEVICE — DRAPE EQUIPMENT RF WAND

## (undated) DEVICE — SUT MONOCRYL 4-0 PS-2 18 IN Y496G

## (undated) DEVICE — 3M™ STERI-STRIP™ REINFORCED ADHESIVE SKIN CLOSURES, R1547, 1/2 IN X 4 IN (12 MM X 100 MM), 6 STRIPS/ENVELOPE: Brand: 3M™ STERI-STRIP™

## (undated) DEVICE — SHEATH, GUIDE, SAVI SCOUT®: Brand: SAVI SCOUT®

## (undated) DEVICE — CHLORAPREP HI-LITE 26ML ORANGE

## (undated) DEVICE — SYRINGE 1ML TB 26G X 3/8 SAFETY

## (undated) DEVICE — TUBING SUCTION 5MM X 12 FT

## (undated) DEVICE — PENCIL ELECTROSURG E-Z CLEAN -0035H

## (undated) DEVICE — GLOVE SRG BIOGEL 7

## (undated) DEVICE — INTENDED FOR TISSUE SEPARATION, AND OTHER PROCEDURES THAT REQUIRE A SHARP SURGICAL BLADE TO PUNCTURE OR CUT.: Brand: BARD-PARKER SAFETY BLADES SIZE 15, STERILE

## (undated) DEVICE — SMOKE EVACUATION TUBING WITH 8 IN INTEGRAL WAND AND SPONGE GUARD: Brand: BUFFALO FILTER

## (undated) DEVICE — SUT VICRYL 3-0 SH 27 IN J416H

## (undated) DEVICE — DRAPE PROBE NEO-PROBE/ULTRASOUND

## (undated) DEVICE — MEDI-VAC YANK SUCT HNDL W/TPRD BULBOUS TIP: Brand: CARDINAL HEALTH

## (undated) DEVICE — BETHLEHEM UNIVERSAL MINOR GEN: Brand: CARDINAL HEALTH

## (undated) DEVICE — LIGHT HANDLE COVER SLEEVE DISP BLUE STELLAR